# Patient Record
Sex: FEMALE | Race: WHITE | Employment: OTHER | URBAN - METROPOLITAN AREA
[De-identification: names, ages, dates, MRNs, and addresses within clinical notes are randomized per-mention and may not be internally consistent; named-entity substitution may affect disease eponyms.]

---

## 2018-01-01 ENCOUNTER — TELEPHONE (OUTPATIENT)
Dept: PHARMACY | Facility: CLINIC | Age: 73
End: 2018-01-01

## 2018-01-01 ENCOUNTER — OFFICE VISIT (OUTPATIENT)
Dept: INTERNAL MEDICINE CLINIC | Age: 73
End: 2018-01-01
Payer: MEDICARE

## 2018-01-01 ENCOUNTER — HOSPITAL ENCOUNTER (EMERGENCY)
Age: 73
Discharge: HOME | End: 2018-04-19
Attending: EMERGENCY MEDICINE
Payer: MEDICARE

## 2018-01-01 ENCOUNTER — APPOINTMENT (OUTPATIENT)
Dept: MRI IMAGING | Age: 73
DRG: 478 | End: 2018-01-01
Payer: MEDICARE

## 2018-01-01 ENCOUNTER — HOSPITAL ENCOUNTER (INPATIENT)
Age: 73
LOS: 5 days | Discharge: NURSING, REHAB OR LONG-TERM CARE | DRG: 478 | End: 2018-07-20
Attending: EMERGENCY MEDICINE | Admitting: INTERNAL MEDICINE
Payer: MEDICARE

## 2018-01-01 ENCOUNTER — APPOINTMENT (OUTPATIENT)
Dept: GENERAL RADIOLOGY | Age: 73
DRG: 478 | End: 2018-01-01
Payer: MEDICARE

## 2018-01-01 ENCOUNTER — HOSPITAL ENCOUNTER (EMERGENCY)
Age: 73
Discharge: HOME | End: 2018-12-07
Attending: EMERGENCY MEDICINE
Payer: MEDICARE

## 2018-01-01 ENCOUNTER — OFFICE VISIT (OUTPATIENT)
Dept: ONCOLOGY | Age: 73
End: 2018-01-01
Payer: MEDICARE

## 2018-01-01 ENCOUNTER — HOSPITAL ENCOUNTER (EMERGENCY)
Age: 73
Discharge: HOME | End: 2018-09-17
Attending: EMERGENCY MEDICINE
Payer: MEDICARE

## 2018-01-01 ENCOUNTER — TELEPHONE (OUTPATIENT)
Dept: ONCOLOGY | Age: 73
End: 2018-01-01

## 2018-01-01 ENCOUNTER — HOSPITAL ENCOUNTER (OUTPATIENT)
Age: 73
Discharge: HOME | End: 2018-10-19
Payer: MEDICARE

## 2018-01-01 ENCOUNTER — OFFICE VISIT (OUTPATIENT)
Dept: ORTHOPEDIC SURGERY | Age: 73
End: 2018-01-01
Payer: MEDICARE

## 2018-01-01 ENCOUNTER — ANESTHESIA (OUTPATIENT)
Dept: OPERATING ROOM | Age: 73
DRG: 478 | End: 2018-01-01
Payer: MEDICARE

## 2018-01-01 ENCOUNTER — HOSPITAL ENCOUNTER (OUTPATIENT)
Age: 73
Discharge: HOME | End: 2018-08-09
Payer: MEDICARE

## 2018-01-01 ENCOUNTER — APPOINTMENT (OUTPATIENT)
Dept: CT IMAGING | Age: 73
DRG: 478 | End: 2018-01-01
Payer: MEDICARE

## 2018-01-01 ENCOUNTER — HOSPITAL ENCOUNTER (OUTPATIENT)
Dept: CT IMAGING | Age: 73
Discharge: HOME | End: 2018-10-14
Payer: MEDICARE

## 2018-01-01 ENCOUNTER — ANESTHESIA EVENT (OUTPATIENT)
Dept: OPERATING ROOM | Age: 73
DRG: 478 | End: 2018-01-01
Payer: MEDICARE

## 2018-01-01 ENCOUNTER — TELEPHONE (OUTPATIENT)
Dept: ORTHOPEDIC SURGERY | Age: 73
End: 2018-01-01

## 2018-01-01 ENCOUNTER — HOSPITAL ENCOUNTER (OUTPATIENT)
Dept: RADIATION ONCOLOGY | Age: 73
Discharge: HOME | End: 2018-07-26
Payer: MEDICARE

## 2018-01-01 ENCOUNTER — HOSPITAL ENCOUNTER (OUTPATIENT)
Dept: RADIATION ONCOLOGY | Age: 73
Discharge: HOME | End: 2018-08-01
Payer: MEDICARE

## 2018-01-01 ENCOUNTER — TELEPHONE (OUTPATIENT)
Dept: INTERNAL MEDICINE CLINIC | Age: 73
End: 2018-01-01

## 2018-01-01 ENCOUNTER — HOSPITAL ENCOUNTER (OUTPATIENT)
Dept: MRI IMAGING | Age: 73
Discharge: HOME | End: 2018-08-10
Payer: MEDICARE

## 2018-01-01 ENCOUNTER — APPOINTMENT (OUTPATIENT)
Dept: GENERAL RADIOLOGY | Age: 73
End: 2018-01-01
Payer: MEDICARE

## 2018-01-01 ENCOUNTER — OFFICE VISIT (OUTPATIENT)
Dept: ORTHOPEDIC SURGERY | Age: 73
End: 2018-01-01

## 2018-01-01 VITALS
SYSTOLIC BLOOD PRESSURE: 146 MMHG | OXYGEN SATURATION: 97 % | HEART RATE: 93 BPM | BODY MASS INDEX: 25.12 KG/M2 | DIASTOLIC BLOOD PRESSURE: 70 MMHG | HEIGHT: 66 IN | WEIGHT: 156.31 LBS | TEMPERATURE: 98.1 F | RESPIRATION RATE: 16 BRPM

## 2018-01-01 VITALS
DIASTOLIC BLOOD PRESSURE: 76 MMHG | WEIGHT: 124.25 LBS | BODY MASS INDEX: 20.05 KG/M2 | TEMPERATURE: 99.4 F | SYSTOLIC BLOOD PRESSURE: 144 MMHG | HEART RATE: 93 BPM

## 2018-01-01 VITALS
WEIGHT: 119.31 LBS | HEART RATE: 85 BPM | TEMPERATURE: 97.8 F | DIASTOLIC BLOOD PRESSURE: 83 MMHG | BODY MASS INDEX: 19.26 KG/M2 | SYSTOLIC BLOOD PRESSURE: 138 MMHG

## 2018-01-01 VITALS
SYSTOLIC BLOOD PRESSURE: 142 MMHG | WEIGHT: 119.5 LBS | HEART RATE: 76 BPM | TEMPERATURE: 98.2 F | BODY MASS INDEX: 19.29 KG/M2 | DIASTOLIC BLOOD PRESSURE: 82 MMHG

## 2018-01-01 VITALS
RESPIRATION RATE: 13 BRPM | TEMPERATURE: 97 F | OXYGEN SATURATION: 99 % | SYSTOLIC BLOOD PRESSURE: 111 MMHG | DIASTOLIC BLOOD PRESSURE: 58 MMHG

## 2018-01-01 VITALS
RESPIRATION RATE: 16 BRPM | WEIGHT: 125 LBS | HEIGHT: 66 IN | OXYGEN SATURATION: 100 % | TEMPERATURE: 97.7 F | BODY MASS INDEX: 20.09 KG/M2 | SYSTOLIC BLOOD PRESSURE: 118 MMHG | DIASTOLIC BLOOD PRESSURE: 61 MMHG | HEART RATE: 72 BPM

## 2018-01-01 VITALS
SYSTOLIC BLOOD PRESSURE: 170 MMHG | DIASTOLIC BLOOD PRESSURE: 91 MMHG | HEIGHT: 63 IN | BODY MASS INDEX: 24.27 KG/M2 | HEART RATE: 94 BPM | WEIGHT: 137 LBS

## 2018-01-01 VITALS
OXYGEN SATURATION: 100 % | DIASTOLIC BLOOD PRESSURE: 73 MMHG | SYSTOLIC BLOOD PRESSURE: 148 MMHG | RESPIRATION RATE: 18 BRPM | BODY MASS INDEX: 19.13 KG/M2 | WEIGHT: 119 LBS | HEART RATE: 76 BPM | TEMPERATURE: 97.7 F | HEIGHT: 66 IN

## 2018-01-01 VITALS — DIASTOLIC BLOOD PRESSURE: 51 MMHG | OXYGEN SATURATION: 99 % | TEMPERATURE: 89.8 F | SYSTOLIC BLOOD PRESSURE: 102 MMHG

## 2018-01-01 VITALS
TEMPERATURE: 98 F | BODY MASS INDEX: 24.11 KG/M2 | HEIGHT: 66 IN | HEART RATE: 73 BPM | DIASTOLIC BLOOD PRESSURE: 69 MMHG | OXYGEN SATURATION: 100 % | SYSTOLIC BLOOD PRESSURE: 177 MMHG | WEIGHT: 150 LBS | RESPIRATION RATE: 16 BRPM

## 2018-01-01 VITALS
SYSTOLIC BLOOD PRESSURE: 136 MMHG | WEIGHT: 125 LBS | TEMPERATURE: 99 F | DIASTOLIC BLOOD PRESSURE: 70 MMHG | BODY MASS INDEX: 20.18 KG/M2 | HEART RATE: 67 BPM

## 2018-01-01 DIAGNOSIS — C79.51 METASTASIS TO BONE (HCC): ICD-10-CM

## 2018-01-01 DIAGNOSIS — C50.919 METASTATIC BREAST CANCER (HCC): Primary | ICD-10-CM

## 2018-01-01 DIAGNOSIS — C50.919 METASTATIC BREAST CANCER (HCC): ICD-10-CM

## 2018-01-01 DIAGNOSIS — C50.911 MALIGNANT NEOPLASM OF RIGHT FEMALE BREAST, UNSPECIFIED ESTROGEN RECEPTOR STATUS, UNSPECIFIED SITE OF BREAST (HCC): ICD-10-CM

## 2018-01-01 DIAGNOSIS — I10 ESSENTIAL HYPERTENSION: Primary | ICD-10-CM

## 2018-01-01 DIAGNOSIS — C79.51 METASTASIS TO BONE (HCC): Primary | ICD-10-CM

## 2018-01-01 DIAGNOSIS — I87.1 SVC SYNDROME: ICD-10-CM

## 2018-01-01 DIAGNOSIS — R55 NEAR SYNCOPE: Primary | ICD-10-CM

## 2018-01-01 DIAGNOSIS — J91.0 MALIGNANT PLEURAL EFFUSION: ICD-10-CM

## 2018-01-01 DIAGNOSIS — M25.562 ACUTE PAIN OF LEFT KNEE: Primary | ICD-10-CM

## 2018-01-01 DIAGNOSIS — R55 SYNCOPE AND COLLAPSE: Primary | ICD-10-CM

## 2018-01-01 DIAGNOSIS — E87.6 HYPOKALEMIA: ICD-10-CM

## 2018-01-01 DIAGNOSIS — R52 PAIN: Primary | ICD-10-CM

## 2018-01-01 DIAGNOSIS — I10 ESSENTIAL HYPERTENSION: ICD-10-CM

## 2018-01-01 LAB
-: ABNORMAL
ABSOLUTE BANDS #: 0.73 K/UL (ref 0–1)
ABSOLUTE EOS #: 0 K/UL (ref 0–0.4)
ABSOLUTE EOS #: 0.03 K/UL (ref 0–0.4)
ABSOLUTE EOS #: 0.05 K/UL (ref 0–0.4)
ABSOLUTE EOS #: 0.05 K/UL (ref 0–0.4)
ABSOLUTE IMMATURE GRANULOCYTE: ABNORMAL K/UL (ref 0–0.3)
ABSOLUTE LYMPH #: 1.47 K/UL (ref 1–4.8)
ABSOLUTE LYMPH #: 1.76 K/UL (ref 1–4.8)
ABSOLUTE LYMPH #: 1.97 K/UL (ref 1–4.8)
ABSOLUTE LYMPH #: 13.67 K/UL (ref 1–4.8)
ABSOLUTE LYMPH #: 21.53 K/UL (ref 1–4.8)
ABSOLUTE LYMPH #: 3.02 K/UL (ref 1–4.8)
ABSOLUTE LYMPH #: 30.94 K/UL (ref 1–4.8)
ABSOLUTE LYMPH #: 42.93 K/UL (ref 1–4.8)
ABSOLUTE LYMPH #: 5.98 K/UL (ref 1–4.8)
ABSOLUTE MONO #: 0.09 K/UL (ref 0.1–0.8)
ABSOLUTE MONO #: 0.2 K/UL (ref 0.1–1.3)
ABSOLUTE MONO #: 0.36 K/UL (ref 0.1–1.3)
ABSOLUTE MONO #: 0.37 K/UL (ref 0.1–1.3)
ABSOLUTE MONO #: 0.56 K/UL (ref 0.1–1.3)
ABSOLUTE MONO #: 0.65 K/UL (ref 0.1–1.3)
ABSOLUTE MONO #: 0.73 K/UL (ref 0.1–1.3)
ABSOLUTE MONO #: 1.25 K/UL (ref 0.1–1.3)
ABSOLUTE MONO #: 2.12 K/UL (ref 0.1–1.3)
ALBUMIN SERPL-MCNC: 3.3 G/DL (ref 3.5–5.2)
ALBUMIN SERPL-MCNC: 3.8 G/DL (ref 3.5–5.2)
ALBUMIN SERPL-MCNC: 4.3 G/DL (ref 3.5–5.2)
ALBUMIN SERPL-MCNC: NORMAL G/DL
ALBUMIN/GLOBULIN RATIO: 2 (ref 1–2.5)
ALBUMIN/GLOBULIN RATIO: ABNORMAL (ref 1–2.5)
ALBUMIN/GLOBULIN RATIO: ABNORMAL (ref 1–2.5)
ALP BLD-CCNC: 145 U/L (ref 35–104)
ALP BLD-CCNC: 94 U/L (ref 35–104)
ALP BLD-CCNC: 94 U/L (ref 35–104)
ALP BLD-CCNC: NORMAL U/L
ALT SERPL-CCNC: 10 U/L (ref 5–33)
ALT SERPL-CCNC: 8 U/L (ref 5–33)
ALT SERPL-CCNC: 8 U/L (ref 5–33)
ALT SERPL-CCNC: NORMAL U/L
AMORPHOUS: ABNORMAL
ANION GAP SERPL CALCULATED.3IONS-SCNC: 10 MMOL/L (ref 9–17)
ANION GAP SERPL CALCULATED.3IONS-SCNC: 12 MMOL/L (ref 9–17)
ANION GAP SERPL CALCULATED.3IONS-SCNC: 13 MMOL/L (ref 9–17)
ANION GAP SERPL CALCULATED.3IONS-SCNC: 14 MMOL/L (ref 9–17)
ANION GAP SERPL CALCULATED.3IONS-SCNC: 14 MMOL/L (ref 9–17)
ANION GAP SERPL CALCULATED.3IONS-SCNC: 15 MMOL/L (ref 9–17)
ANION GAP SERPL CALCULATED.3IONS-SCNC: 15 MMOL/L (ref 9–17)
ANION GAP SERPL CALCULATED.3IONS-SCNC: NORMAL MMOL/L
AST SERPL-CCNC: 35 U/L
AST SERPL-CCNC: 71 U/L
AST SERPL-CCNC: 86 U/L
AST SERPL-CCNC: NORMAL U/L
ATYPICAL LYMPHOCYTE ABSOLUTE COUNT: 0.12 K/UL
ATYPICAL LYMPHOCYTE ABSOLUTE COUNT: 0.23 K/UL
ATYPICAL LYMPHOCYTE ABSOLUTE COUNT: 0.56 K/UL
ATYPICAL LYMPHOCYTES: 1 %
ATYPICAL LYMPHOCYTES: 2 %
ATYPICAL LYMPHOCYTES: 5 %
BACTERIA: ABNORMAL
BANDS: 2 % (ref 0–10)
BASOPHILS # BLD: 0 % (ref 0–2)
BASOPHILS # BLD: 1 % (ref 0–2)
BASOPHILS # BLD: 1 % (ref 0–2)
BASOPHILS ABSOLUTE: 0 K/UL (ref 0–0.2)
BASOPHILS ABSOLUTE: 0.03 K/UL (ref 0–0.2)
BASOPHILS ABSOLUTE: 0.05 K/UL (ref 0–0.2)
BASOPHILS ABSOLUTE: NORMAL /ΜL
BASOPHILS RELATIVE PERCENT: NORMAL %
BILIRUB SERPL-MCNC: 0.48 MG/DL (ref 0.3–1.2)
BILIRUB SERPL-MCNC: 0.75 MG/DL (ref 0.3–1.2)
BILIRUB SERPL-MCNC: 0.96 MG/DL (ref 0.3–1.2)
BILIRUB SERPL-MCNC: NORMAL MG/DL (ref 0.1–1.4)
BILIRUBIN URINE: NEGATIVE
BILIRUBIN URINE: NEGATIVE
BUN BLDV-MCNC: 13 MG/DL (ref 8–23)
BUN BLDV-MCNC: 14 MG/DL (ref 8–23)
BUN BLDV-MCNC: 16 MG/DL (ref 8–23)
BUN BLDV-MCNC: 16 MG/DL (ref 8–23)
BUN BLDV-MCNC: 7 MG/DL (ref 8–23)
BUN BLDV-MCNC: 7 MG/DL (ref 8–23)
BUN BLDV-MCNC: NORMAL MG/DL
BUN/CREAT BLD: ABNORMAL (ref 9–20)
C-REACTIVE PROTEIN: 131 MG/L (ref 0–5)
CA 27-29: 1794 U/ML (ref 0–38)
CALCIUM SERPL-MCNC: 8.3 MG/DL (ref 8.6–10.4)
CALCIUM SERPL-MCNC: 8.4 MG/DL (ref 8.6–10.4)
CALCIUM SERPL-MCNC: 8.4 MG/DL (ref 8.6–10.4)
CALCIUM SERPL-MCNC: 8.6 MG/DL (ref 8.6–10.4)
CALCIUM SERPL-MCNC: 8.8 MG/DL (ref 8.6–10.4)
CALCIUM SERPL-MCNC: 9.1 MG/DL (ref 8.6–10.4)
CALCIUM SERPL-MCNC: 9.2 MG/DL (ref 8.6–10.4)
CALCIUM SERPL-MCNC: 9.6 MG/DL (ref 8.6–10.4)
CALCIUM SERPL-MCNC: 9.8 MG/DL (ref 8.6–10.4)
CALCIUM SERPL-MCNC: NORMAL MG/DL
CARCINOEMBRYONIC ANTIGEN: 1.6 NG/ML
CASTS UA: ABNORMAL /LPF
CHLORIDE BLD-SCNC: 100 MMOL/L (ref 98–107)
CHLORIDE BLD-SCNC: 100 MMOL/L (ref 98–107)
CHLORIDE BLD-SCNC: 103 MMOL/L (ref 98–107)
CHLORIDE BLD-SCNC: 104 MMOL/L (ref 98–107)
CHLORIDE BLD-SCNC: 91 MMOL/L (ref 98–107)
CHLORIDE BLD-SCNC: 93 MMOL/L (ref 98–107)
CHLORIDE BLD-SCNC: 94 MMOL/L (ref 98–107)
CHLORIDE BLD-SCNC: 95 MMOL/L (ref 98–107)
CHLORIDE BLD-SCNC: 95 MMOL/L (ref 98–107)
CHLORIDE BLD-SCNC: NORMAL MMOL/L
CHP ED QC CHECK: YES
CO2: 23 MMOL/L (ref 20–31)
CO2: 24 MMOL/L (ref 20–31)
CO2: 25 MMOL/L (ref 20–31)
CO2: 25 MMOL/L (ref 20–31)
CO2: 26 MMOL/L (ref 20–31)
CO2: NORMAL MMOL/L
COLOR: YELLOW
COLOR: YELLOW
COMMENT UA: ABNORMAL
COMMENT UA: ABNORMAL
CREAT SERPL-MCNC: 0.44 MG/DL (ref 0.5–0.9)
CREAT SERPL-MCNC: 0.46 MG/DL (ref 0.5–0.9)
CREAT SERPL-MCNC: 0.46 MG/DL (ref 0.5–0.9)
CREAT SERPL-MCNC: 0.47 MG/DL (ref 0.5–0.9)
CREAT SERPL-MCNC: 0.5 MG/DL (ref 0.5–0.9)
CREAT SERPL-MCNC: 0.52 MG/DL (ref 0.5–0.9)
CREAT SERPL-MCNC: 0.56 MG/DL (ref 0.5–0.9)
CREAT SERPL-MCNC: 0.6 MG/DL (ref 0.5–0.9)
CREAT SERPL-MCNC: 0.6 MG/DL (ref 0.5–0.9)
CREAT SERPL-MCNC: 0.67 MG/DL (ref 0.5–0.9)
CREAT SERPL-MCNC: NORMAL MG/DL
CRYSTALS, UA: ABNORMAL /HPF
DIFFERENTIAL TYPE: ABNORMAL
EKG ATRIAL RATE: 74 BPM
EKG ATRIAL RATE: 76 BPM
EKG P AXIS: 71 DEGREES
EKG P AXIS: 79 DEGREES
EKG P-R INTERVAL: 146 MS
EKG P-R INTERVAL: 148 MS
EKG Q-T INTERVAL: 408 MS
EKG Q-T INTERVAL: 420 MS
EKG QRS DURATION: 94 MS
EKG QRS DURATION: 96 MS
EKG QTC CALCULATION (BAZETT): 459 MS
EKG QTC CALCULATION (BAZETT): 466 MS
EKG R AXIS: 62 DEGREES
EKG R AXIS: 72 DEGREES
EKG T AXIS: 68 DEGREES
EKG T AXIS: 72 DEGREES
EKG VENTRICULAR RATE: 74 BPM
EKG VENTRICULAR RATE: 76 BPM
EOSINOPHILS ABSOLUTE: NORMAL /ΜL
EOSINOPHILS RELATIVE PERCENT: 0 % (ref 0–4)
EOSINOPHILS RELATIVE PERCENT: 1 % (ref 0–4)
EOSINOPHILS RELATIVE PERCENT: 1 % (ref 0–4)
EOSINOPHILS RELATIVE PERCENT: 1 % (ref 1–4)
EOSINOPHILS RELATIVE PERCENT: NORMAL %
EPITHELIAL CELLS UA: ABNORMAL /HPF
FLOW CYTOMETRY BL: NORMAL
GFR AFRICAN AMERICAN: >60 ML/MIN
GFR CALCULATED: NORMAL
GFR NON-AFRICAN AMERICAN: >60 ML/MIN
GFR SERPL CREATININE-BSD FRML MDRD: ABNORMAL ML/MIN/{1.73_M2}
GFR SERPL CREATININE-BSD FRML MDRD: NORMAL ML/MIN/{1.73_M2}
GFR SERPL CREATININE-BSD FRML MDRD: NORMAL ML/MIN/{1.73_M2}
GLUCOSE BLD-MCNC: 102 MG/DL (ref 65–105)
GLUCOSE BLD-MCNC: 102 MG/DL (ref 70–99)
GLUCOSE BLD-MCNC: 102 MG/DL (ref 70–99)
GLUCOSE BLD-MCNC: 103 MG/DL (ref 70–99)
GLUCOSE BLD-MCNC: 108 MG/DL (ref 70–99)
GLUCOSE BLD-MCNC: 112 MG/DL (ref 70–99)
GLUCOSE BLD-MCNC: 116 MG/DL (ref 70–99)
GLUCOSE BLD-MCNC: 117 MG/DL (ref 65–105)
GLUCOSE BLD-MCNC: 119 MG/DL (ref 65–105)
GLUCOSE BLD-MCNC: 119 MG/DL (ref 65–105)
GLUCOSE BLD-MCNC: 120 MG/DL (ref 65–105)
GLUCOSE BLD-MCNC: 120 MG/DL (ref 65–105)
GLUCOSE BLD-MCNC: 121 MG/DL (ref 65–105)
GLUCOSE BLD-MCNC: 122 MG/DL (ref 65–105)
GLUCOSE BLD-MCNC: 122 MG/DL (ref 70–99)
GLUCOSE BLD-MCNC: 123 MG/DL (ref 65–105)
GLUCOSE BLD-MCNC: 136 MG/DL (ref 70–99)
GLUCOSE BLD-MCNC: 140 MG/DL (ref 65–105)
GLUCOSE BLD-MCNC: 148 MG/DL (ref 65–105)
GLUCOSE BLD-MCNC: 151 MG/DL (ref 65–105)
GLUCOSE BLD-MCNC: 186 MG/DL (ref 65–105)
GLUCOSE BLD-MCNC: 72 MG/DL
GLUCOSE BLD-MCNC: 73 MG/DL (ref 65–105)
GLUCOSE BLD-MCNC: 94 MG/DL (ref 65–105)
GLUCOSE BLD-MCNC: 96 MG/DL (ref 70–99)
GLUCOSE BLD-MCNC: NORMAL MG/DL
GLUCOSE URINE: NEGATIVE
GLUCOSE URINE: NEGATIVE
HCT VFR BLD CALC: 24.9 % (ref 36–46)
HCT VFR BLD CALC: 25.6 % (ref 36–46)
HCT VFR BLD CALC: 26.8 % (ref 36–46)
HCT VFR BLD CALC: 29.4 % (ref 36–46)
HCT VFR BLD CALC: 30.2 % (ref 36–46)
HCT VFR BLD CALC: 30.8 % (ref 36–46)
HCT VFR BLD CALC: 30.8 % (ref 36–46)
HCT VFR BLD CALC: 32.4 % (ref 36–46)
HCT VFR BLD CALC: 32.9 % (ref 36–46)
HCT VFR BLD CALC: 33.1 % (ref 36–46)
HCT VFR BLD CALC: NORMAL % (ref 36–46)
HEMOGLOBIN: 10.1 G/DL (ref 12–16)
HEMOGLOBIN: 10.1 G/DL (ref 12–16)
HEMOGLOBIN: 10.6 G/DL (ref 12–16)
HEMOGLOBIN: 10.8 G/DL (ref 12–16)
HEMOGLOBIN: 11.3 G/DL (ref 12–16)
HEMOGLOBIN: 11.6 G/DL (ref 12–16)
HEMOGLOBIN: 8.1 G/DL (ref 12–16)
HEMOGLOBIN: 8.4 G/DL (ref 12–16)
HEMOGLOBIN: 9.5 G/DL (ref 12–16)
HEMOGLOBIN: 9.7 G/DL (ref 12–16)
HEMOGLOBIN: NORMAL G/DL (ref 12–16)
IMMATURE GRANULOCYTES: ABNORMAL %
INR BLD: 1.2
KETONES, URINE: ABNORMAL
KETONES, URINE: NEGATIVE
LACTATE DEHYDROGENASE: 189 U/L (ref 135–214)
LEUKOCYTE ESTERASE, URINE: ABNORMAL
LEUKOCYTE ESTERASE, URINE: NEGATIVE
LYMPHOCYTES # BLD: 28 % (ref 24–44)
LYMPHOCYTES # BLD: 36 % (ref 24–44)
LYMPHOCYTES # BLD: 44 % (ref 24–44)
LYMPHOCYTES # BLD: 49 % (ref 24–44)
LYMPHOCYTES # BLD: 59 % (ref 24–44)
LYMPHOCYTES # BLD: 74 % (ref 24–44)
LYMPHOCYTES # BLD: 81 % (ref 24–44)
LYMPHOCYTES ABSOLUTE: NORMAL /ΜL
LYMPHOCYTES RELATIVE PERCENT: NORMAL %
MAGNESIUM: 1.7 MG/DL (ref 1.6–2.6)
MAGNESIUM: 1.8 MG/DL (ref 1.6–2.6)
MCH RBC QN AUTO: 29.6 PG (ref 26–34)
MCH RBC QN AUTO: 30 PG (ref 26–34)
MCH RBC QN AUTO: 30.2 PG (ref 26–34)
MCH RBC QN AUTO: 30.6 PG (ref 26–34)
MCH RBC QN AUTO: 32.1 PG (ref 26–34)
MCH RBC QN AUTO: 36.4 PG (ref 26–34)
MCH RBC QN AUTO: 37.7 PG (ref 26–34)
MCH RBC QN AUTO: 41.9 PG (ref 26–34)
MCH RBC QN AUTO: NORMAL PG
MCHC RBC AUTO-ENTMCNC: 32.6 G/DL (ref 31–37)
MCHC RBC AUTO-ENTMCNC: 32.6 G/DL (ref 31–37)
MCHC RBC AUTO-ENTMCNC: 32.7 G/DL (ref 31–37)
MCHC RBC AUTO-ENTMCNC: 32.9 G/DL (ref 31–37)
MCHC RBC AUTO-ENTMCNC: 33 G/DL (ref 31–37)
MCHC RBC AUTO-ENTMCNC: 33.3 G/DL (ref 31–37)
MCHC RBC AUTO-ENTMCNC: 34.3 G/DL (ref 31–37)
MCHC RBC AUTO-ENTMCNC: 34.5 G/DL (ref 31–37)
MCHC RBC AUTO-ENTMCNC: 35.6 G/DL (ref 31–37)
MCHC RBC AUTO-ENTMCNC: 35.8 G/DL (ref 31–37)
MCHC RBC AUTO-ENTMCNC: NORMAL G/DL
MCV RBC AUTO: 102 FL (ref 80–100)
MCV RBC AUTO: 109.2 FL (ref 80–100)
MCV RBC AUTO: 117 FL (ref 80–100)
MCV RBC AUTO: 89.3 FL (ref 80–100)
MCV RBC AUTO: 89.9 FL (ref 80–100)
MCV RBC AUTO: 90.6 FL (ref 80–100)
MCV RBC AUTO: 91 FL (ref 80–100)
MCV RBC AUTO: 91.2 FL (ref 80–100)
MCV RBC AUTO: 92.6 FL (ref 80–100)
MCV RBC AUTO: 98 FL (ref 80–100)
MCV RBC AUTO: NORMAL FL
MONOCYTES # BLD: 2 % (ref 1–7)
MONOCYTES # BLD: 2 % (ref 1–7)
MONOCYTES # BLD: 3 % (ref 1–7)
MONOCYTES # BLD: 4 % (ref 1–7)
MONOCYTES # BLD: 4 % (ref 1–7)
MONOCYTES # BLD: 6 % (ref 1–7)
MONOCYTES # BLD: 8 % (ref 1–7)
MONOCYTES ABSOLUTE: NORMAL /ΜL
MONOCYTES RELATIVE PERCENT: NORMAL %
MORPHOLOGY: ABNORMAL
MORPHOLOGY: NORMAL
MUCUS: ABNORMAL
NEUTROPHILS ABSOLUTE: NORMAL /ΜL
NEUTROPHILS RELATIVE PERCENT: NORMAL %
NITRITE, URINE: NEGATIVE
NITRITE, URINE: NEGATIVE
NRBC AUTOMATED: ABNORMAL PER 100 WBC
OTHER OBSERVATIONS UA: ABNORMAL
PATHOLOGIST REVIEW: NORMAL
PDW BLD-RTO: 13.6 % (ref 11.5–14.9)
PDW BLD-RTO: 14.9 % (ref 11.5–14.9)
PDW BLD-RTO: 15 % (ref 11.5–14.9)
PDW BLD-RTO: 15.1 % (ref 11.5–14.9)
PDW BLD-RTO: 15.2 % (ref 11.5–14.9)
PDW BLD-RTO: 15.4 % (ref 11.5–14.9)
PDW BLD-RTO: 15.4 % (ref 11.5–14.9)
PDW BLD-RTO: 20.5 % (ref 12.5–15.4)
PDW BLD-RTO: 21.9 % (ref 12.5–15.4)
PDW BLD-RTO: 22.5 % (ref 11.5–14.9)
PDW BLD-RTO: NORMAL %
PH UA: 6 (ref 5–8)
PH UA: 6.5 (ref 5–8)
PHOSPHORUS: 3.6 MG/DL (ref 2.6–4.5)
PLATELET # BLD: 172 K/UL (ref 150–450)
PLATELET # BLD: 239 K/UL (ref 150–450)
PLATELET # BLD: 250 K/UL (ref 150–450)
PLATELET # BLD: 256 K/UL (ref 150–450)
PLATELET # BLD: 267 K/UL (ref 140–450)
PLATELET # BLD: 276 K/UL (ref 140–450)
PLATELET # BLD: 300 K/UL (ref 150–450)
PLATELET # BLD: 301 K/UL (ref 150–450)
PLATELET # BLD: 329 K/UL (ref 150–450)
PLATELET # BLD: 348 K/UL (ref 150–450)
PLATELET # BLD: NORMAL K/ΜL
PLATELET ESTIMATE: ABNORMAL
PMV BLD AUTO: 7 FL (ref 6–12)
PMV BLD AUTO: 7.1 FL (ref 6–12)
PMV BLD AUTO: 7.2 FL (ref 6–12)
PMV BLD AUTO: 7.3 FL (ref 6–12)
PMV BLD AUTO: 7.5 FL (ref 6–12)
PMV BLD AUTO: 7.5 FL (ref 6–12)
PMV BLD AUTO: 8.2 FL (ref 6–12)
PMV BLD AUTO: NORMAL FL
POTASSIUM SERPL-SCNC: 3.1 MMOL/L (ref 3.7–5.3)
POTASSIUM SERPL-SCNC: 3.4 MMOL/L (ref 3.7–5.3)
POTASSIUM SERPL-SCNC: 3.8 MMOL/L (ref 3.7–5.3)
POTASSIUM SERPL-SCNC: 3.8 MMOL/L (ref 3.7–5.3)
POTASSIUM SERPL-SCNC: 4.4 MMOL/L (ref 3.7–5.3)
POTASSIUM SERPL-SCNC: 4.5 MMOL/L (ref 3.7–5.3)
POTASSIUM SERPL-SCNC: 4.5 MMOL/L (ref 3.7–5.3)
POTASSIUM SERPL-SCNC: 4.6 MMOL/L (ref 3.7–5.3)
POTASSIUM SERPL-SCNC: 4.7 MMOL/L (ref 3.7–5.3)
POTASSIUM SERPL-SCNC: NORMAL MMOL/L
PROTEIN UA: NEGATIVE
PROTEIN UA: NEGATIVE
PROTHROMBIN TIME: 12.5 SEC (ref 9.7–12)
RBC # BLD: 2.63 M/UL (ref 4–5.2)
RBC # BLD: 2.69 M/UL (ref 4–5.2)
RBC # BLD: 2.77 M/UL (ref 4–5.2)
RBC # BLD: 2.8 M/UL (ref 4–5.2)
RBC # BLD: 2.82 M/UL (ref 4–5.2)
RBC # BLD: 3.14 M/UL (ref 4–5.2)
RBC # BLD: 3.23 M/UL (ref 4–5.2)
RBC # BLD: 3.35 M/UL (ref 4–5.2)
RBC # BLD: 3.65 M/UL (ref 4–5.2)
RBC # BLD: 3.68 M/UL (ref 4–5.2)
RBC # BLD: ABNORMAL 10*6/UL
RBC # BLD: NORMAL 10^6/ΜL
RBC UA: ABNORMAL /HPF
RENAL EPITHELIAL, UA: ABNORMAL /HPF
SEDIMENTATION RATE, ERYTHROCYTE: 60 MM (ref 0–20)
SEG NEUTROPHILS: 15 % (ref 36–66)
SEG NEUTROPHILS: 23 % (ref 36–66)
SEG NEUTROPHILS: 37 % (ref 36–66)
SEG NEUTROPHILS: 42 % (ref 36–66)
SEG NEUTROPHILS: 46 % (ref 36–66)
SEG NEUTROPHILS: 47 % (ref 36–66)
SEG NEUTROPHILS: 47 % (ref 36–66)
SEG NEUTROPHILS: 58 % (ref 36–66)
SEG NEUTROPHILS: 66 % (ref 36–66)
SEGMENTED NEUTROPHILS ABSOLUTE COUNT: 1.38 K/UL (ref 1.8–7.7)
SEGMENTED NEUTROPHILS ABSOLUTE COUNT: 1.89 K/UL (ref 1.3–9.1)
SEGMENTED NEUTROPHILS ABSOLUTE COUNT: 13.11 K/UL (ref 1.3–9.1)
SEGMENTED NEUTROPHILS ABSOLUTE COUNT: 13.51 K/UL (ref 1.3–9.1)
SEGMENTED NEUTROPHILS ABSOLUTE COUNT: 2.84 K/UL (ref 1.3–9.1)
SEGMENTED NEUTROPHILS ABSOLUTE COUNT: 5.73 K/UL (ref 1.3–9.1)
SEGMENTED NEUTROPHILS ABSOLUTE COUNT: 7.13 K/UL (ref 1.3–9.1)
SEGMENTED NEUTROPHILS ABSOLUTE COUNT: 7.95 K/UL (ref 1.3–9.1)
SEGMENTED NEUTROPHILS ABSOLUTE COUNT: 9.61 K/UL (ref 1.3–9.1)
SODIUM BLD-SCNC: 128 MMOL/L (ref 135–144)
SODIUM BLD-SCNC: 129 MMOL/L (ref 135–144)
SODIUM BLD-SCNC: 131 MMOL/L (ref 135–144)
SODIUM BLD-SCNC: 133 MMOL/L (ref 135–144)
SODIUM BLD-SCNC: 135 MMOL/L (ref 135–144)
SODIUM BLD-SCNC: 136 MMOL/L (ref 135–144)
SODIUM BLD-SCNC: 138 MMOL/L (ref 135–144)
SODIUM BLD-SCNC: 140 MMOL/L (ref 135–144)
SODIUM BLD-SCNC: 141 MMOL/L (ref 135–144)
SODIUM BLD-SCNC: NORMAL MMOL/L
SPECIFIC GRAVITY UA: 1.01 (ref 1–1.03)
SPECIFIC GRAVITY UA: 1.01 (ref 1–1.03)
SURGICAL PATHOLOGY REPORT: NORMAL
TOTAL PROTEIN: 5.7 G/DL (ref 6.4–8.3)
TOTAL PROTEIN: 6 G/DL (ref 6.4–8.3)
TOTAL PROTEIN: 6.4 G/DL (ref 6.4–8.3)
TOTAL PROTEIN: NORMAL
TRICHOMONAS: ABNORMAL
TURBIDITY: CLEAR
TURBIDITY: CLEAR
URINE HGB: NEGATIVE
URINE HGB: NEGATIVE
UROBILINOGEN, URINE: NORMAL
UROBILINOGEN, URINE: NORMAL
WBC # BLD: 10.8 K/UL (ref 3.5–11)
WBC # BLD: 12.2 K/UL (ref 3.5–11)
WBC # BLD: 12.7 K/UL (ref 3.5–11)
WBC # BLD: 27.9 K/UL (ref 3.5–11)
WBC # BLD: 3 K/UL (ref 3.5–11)
WBC # BLD: 36.5 K/UL (ref 3.5–11)
WBC # BLD: 4.5 K/UL (ref 3.5–11)
WBC # BLD: 4.9 K/UL (ref 3.5–11)
WBC # BLD: 41.8 K/UL (ref 3.5–11)
WBC # BLD: 53 K/UL (ref 3.5–11)
WBC # BLD: ABNORMAL 10*3/UL
WBC # BLD: NORMAL 10^3/ML
WBC UA: ABNORMAL /HPF
YEAST: ABNORMAL

## 2018-01-01 PROCEDURE — 99223 1ST HOSP IP/OBS HIGH 75: CPT | Performed by: INTERNAL MEDICINE

## 2018-01-01 PROCEDURE — 85610 PROTHROMBIN TIME: CPT

## 2018-01-01 PROCEDURE — 97535 SELF CARE MNGMENT TRAINING: CPT

## 2018-01-01 PROCEDURE — 82947 ASSAY GLUCOSE BLOOD QUANT: CPT

## 2018-01-01 PROCEDURE — 22513 PERQ VERTEBRAL AUGMENTATION: CPT | Performed by: ORTHOPAEDIC SURGERY

## 2018-01-01 PROCEDURE — 6360000002 HC RX W HCPCS: Performed by: INTERNAL MEDICINE

## 2018-01-01 PROCEDURE — 77334 RADIATION TREATMENT AID(S): CPT | Performed by: RADIOLOGY

## 2018-01-01 PROCEDURE — 77336 RADIATION PHYSICS CONSULT: CPT | Performed by: RADIOLOGY

## 2018-01-01 PROCEDURE — 86300 IMMUNOASSAY TUMOR CA 15-3: CPT

## 2018-01-01 PROCEDURE — 2500000003 HC RX 250 WO HCPCS: Performed by: ANESTHESIOLOGY

## 2018-01-01 PROCEDURE — 84100 ASSAY OF PHOSPHORUS: CPT

## 2018-01-01 PROCEDURE — A9579 GAD-BASE MR CONTRAST NOS,1ML: HCPCS | Performed by: INTERNAL MEDICINE

## 2018-01-01 PROCEDURE — 36415 COLL VENOUS BLD VENIPUNCTURE: CPT

## 2018-01-01 PROCEDURE — 73060 X-RAY EXAM OF HUMERUS: CPT

## 2018-01-01 PROCEDURE — 27245 TREAT THIGH FRACTURE: CPT | Performed by: ORTHOPAEDIC SURGERY

## 2018-01-01 PROCEDURE — 2580000003 HC RX 258: Performed by: ORTHOPAEDIC SURGERY

## 2018-01-01 PROCEDURE — 0QU03JZ SUPPLEMENT LUMBAR VERTEBRA WITH SYNTHETIC SUBSTITUTE, PERCUTANEOUS APPROACH: ICD-10-PCS | Performed by: ORTHOPAEDIC SURGERY

## 2018-01-01 PROCEDURE — 77280 THER RAD SIMULAJ FIELD SMPL: CPT | Performed by: RADIOLOGY

## 2018-01-01 PROCEDURE — 72158 MRI LUMBAR SPINE W/O & W/DYE: CPT

## 2018-01-01 PROCEDURE — 93005 ELECTROCARDIOGRAM TRACING: CPT

## 2018-01-01 PROCEDURE — 0QH606Z INSERTION OF INTRAMEDULLARY INTERNAL FIXATION DEVICE INTO RIGHT UPPER FEMUR, OPEN APPROACH: ICD-10-PCS | Performed by: ORTHOPAEDIC SURGERY

## 2018-01-01 PROCEDURE — 85025 COMPLETE CBC W/AUTO DIFF WBC: CPT

## 2018-01-01 PROCEDURE — 99211 OFF/OP EST MAY X REQ PHY/QHP: CPT | Performed by: INTERNAL MEDICINE

## 2018-01-01 PROCEDURE — 99284 EMERGENCY DEPT VISIT MOD MDM: CPT

## 2018-01-01 PROCEDURE — 2500000003 HC RX 250 WO HCPCS: Performed by: NURSE ANESTHETIST, CERTIFIED REGISTERED

## 2018-01-01 PROCEDURE — 85027 COMPLETE CBC AUTOMATED: CPT

## 2018-01-01 PROCEDURE — 6370000000 HC RX 637 (ALT 250 FOR IP): Performed by: NURSE PRACTITIONER

## 2018-01-01 PROCEDURE — 99213 OFFICE O/P EST LOW 20 MIN: CPT | Performed by: INTERNAL MEDICINE

## 2018-01-01 PROCEDURE — 6370000000 HC RX 637 (ALT 250 FOR IP): Performed by: SURGERY

## 2018-01-01 PROCEDURE — 6360000004 HC RX CONTRAST MEDICATION: Performed by: INTERNAL MEDICINE

## 2018-01-01 PROCEDURE — 3700000001 HC ADD 15 MINUTES (ANESTHESIA): Performed by: ORTHOPAEDIC SURGERY

## 2018-01-01 PROCEDURE — 3700000001 HC ADD 15 MINUTES (ANESTHESIA): Performed by: SURGERY

## 2018-01-01 PROCEDURE — 2580000003 HC RX 258: Performed by: FAMILY MEDICINE

## 2018-01-01 PROCEDURE — 2500000003 HC RX 250 WO HCPCS: Performed by: SURGERY

## 2018-01-01 PROCEDURE — 77412 RADIATION TX DELIVERY LVL 3: CPT | Performed by: RADIOLOGY

## 2018-01-01 PROCEDURE — 85651 RBC SED RATE NONAUTOMATED: CPT

## 2018-01-01 PROCEDURE — 3700000000 HC ANESTHESIA ATTENDED CARE: Performed by: SURGERY

## 2018-01-01 PROCEDURE — 80048 BASIC METABOLIC PNL TOTAL CA: CPT

## 2018-01-01 PROCEDURE — 2720000010 HC SURG SUPPLY STERILE: Performed by: ORTHOPAEDIC SURGERY

## 2018-01-01 PROCEDURE — 2720000010 HC SURG SUPPLY STERILE: Performed by: SURGERY

## 2018-01-01 PROCEDURE — C1713 ANCHOR/SCREW BN/BN,TIS/BN: HCPCS | Performed by: ORTHOPAEDIC SURGERY

## 2018-01-01 PROCEDURE — 70553 MRI BRAIN STEM W/O & W/DYE: CPT

## 2018-01-01 PROCEDURE — 80053 COMPREHEN METABOLIC PANEL: CPT

## 2018-01-01 PROCEDURE — 83735 ASSAY OF MAGNESIUM: CPT

## 2018-01-01 PROCEDURE — 73552 X-RAY EXAM OF FEMUR 2/>: CPT

## 2018-01-01 PROCEDURE — 99204 OFFICE O/P NEW MOD 45 MIN: CPT | Performed by: INTERNAL MEDICINE

## 2018-01-01 PROCEDURE — 6360000002 HC RX W HCPCS: Performed by: FAMILY MEDICINE

## 2018-01-01 PROCEDURE — 3600000004 HC SURGERY LEVEL 4 BASE: Performed by: ORTHOPAEDIC SURGERY

## 2018-01-01 PROCEDURE — 70491 CT SOFT TISSUE NECK W/DYE: CPT

## 2018-01-01 PROCEDURE — 2700000000 HC OXYGEN THERAPY PER DAY

## 2018-01-01 PROCEDURE — 73718 MRI LOWER EXTREMITY W/O DYE: CPT

## 2018-01-01 PROCEDURE — 97530 THERAPEUTIC ACTIVITIES: CPT

## 2018-01-01 PROCEDURE — 51798 US URINE CAPACITY MEASURE: CPT

## 2018-01-01 PROCEDURE — 99212 OFFICE O/P EST SF 10 MIN: CPT | Performed by: ORTHOPAEDIC SURGERY

## 2018-01-01 PROCEDURE — 99232 SBSQ HOSP IP/OBS MODERATE 35: CPT | Performed by: INTERNAL MEDICINE

## 2018-01-01 PROCEDURE — 88342 IMHCHEM/IMCYTCHM 1ST ANTB: CPT

## 2018-01-01 PROCEDURE — 82378 CARCINOEMBRYONIC ANTIGEN: CPT

## 2018-01-01 PROCEDURE — 6360000002 HC RX W HCPCS: Performed by: ORTHOPAEDIC SURGERY

## 2018-01-01 PROCEDURE — 6360000002 HC RX W HCPCS: Performed by: ANESTHESIOLOGY

## 2018-01-01 PROCEDURE — 7100000000 HC PACU RECOVERY - FIRST 15 MIN: Performed by: ORTHOPAEDIC SURGERY

## 2018-01-01 PROCEDURE — 6360000002 HC RX W HCPCS

## 2018-01-01 PROCEDURE — 6360000002 HC RX W HCPCS: Performed by: SURGERY

## 2018-01-01 PROCEDURE — 6370000000 HC RX 637 (ALT 250 FOR IP): Performed by: FAMILY MEDICINE

## 2018-01-01 PROCEDURE — 3600000012 HC SURGERY LEVEL 2 ADDTL 15MIN: Performed by: SURGERY

## 2018-01-01 PROCEDURE — 99214 OFFICE O/P EST MOD 30 MIN: CPT | Performed by: INTERNAL MEDICINE

## 2018-01-01 PROCEDURE — 6360000002 HC RX W HCPCS: Performed by: NURSE ANESTHETIST, CERTIFIED REGISTERED

## 2018-01-01 PROCEDURE — 22515 PERQ VERTEBRAL AUGMENTATION: CPT | Performed by: ORTHOPAEDIC SURGERY

## 2018-01-01 PROCEDURE — 99024 POSTOP FOLLOW-UP VISIT: CPT | Performed by: ORTHOPAEDIC SURGERY

## 2018-01-01 PROCEDURE — 51701 INSERT BLADDER CATHETER: CPT

## 2018-01-01 PROCEDURE — G8988 SELF CARE GOAL STATUS: HCPCS

## 2018-01-01 PROCEDURE — 81003 URINALYSIS AUTO W/O SCOPE: CPT

## 2018-01-01 PROCEDURE — 0QS03ZZ REPOSITION LUMBAR VERTEBRA, PERCUTANEOUS APPROACH: ICD-10-PCS | Performed by: ORTHOPAEDIC SURGERY

## 2018-01-01 PROCEDURE — 2780000010 HC IMPLANT OTHER: Performed by: ORTHOPAEDIC SURGERY

## 2018-01-01 PROCEDURE — 7100000001 HC PACU RECOVERY - ADDTL 15 MIN: Performed by: SURGERY

## 2018-01-01 PROCEDURE — 0QH706Z INSERTION OF INTRAMEDULLARY INTERNAL FIXATION DEVICE INTO LEFT UPPER FEMUR, OPEN APPROACH: ICD-10-PCS | Performed by: ORTHOPAEDIC SURGERY

## 2018-01-01 PROCEDURE — 2580000003 HC RX 258: Performed by: INTERNAL MEDICINE

## 2018-01-01 PROCEDURE — C1769 GUIDE WIRE: HCPCS | Performed by: ORTHOPAEDIC SURGERY

## 2018-01-01 PROCEDURE — 99285 EMERGENCY DEPT VISIT HI MDM: CPT

## 2018-01-01 PROCEDURE — 88341 IMHCHEM/IMCYTCHM EA ADD ANTB: CPT

## 2018-01-01 PROCEDURE — 6360000004 HC RX CONTRAST MEDICATION: Performed by: RADIOLOGY

## 2018-01-01 PROCEDURE — 2580000003 HC RX 258

## 2018-01-01 PROCEDURE — 71260 CT THORAX DX C+: CPT

## 2018-01-01 PROCEDURE — 1200000000 HC SEMI PRIVATE

## 2018-01-01 PROCEDURE — 73562 X-RAY EXAM OF KNEE 3: CPT

## 2018-01-01 PROCEDURE — 99213 OFFICE O/P EST LOW 20 MIN: CPT | Performed by: RADIOLOGY

## 2018-01-01 PROCEDURE — 7100000001 HC PACU RECOVERY - ADDTL 15 MIN: Performed by: ORTHOPAEDIC SURGERY

## 2018-01-01 PROCEDURE — 88184 FLOWCYTOMETRY/ TC 1 MARKER: CPT

## 2018-01-01 PROCEDURE — 88311 DECALCIFY TISSUE: CPT

## 2018-01-01 PROCEDURE — 3600000014 HC SURGERY LEVEL 4 ADDTL 15MIN: Performed by: ORTHOPAEDIC SURGERY

## 2018-01-01 PROCEDURE — 3209999900 FLUORO FOR SURGICAL PROCEDURES

## 2018-01-01 PROCEDURE — 6360000004 HC RX CONTRAST MEDICATION: Performed by: EMERGENCY MEDICINE

## 2018-01-01 PROCEDURE — 72100 X-RAY EXAM L-S SPINE 2/3 VWS: CPT

## 2018-01-01 PROCEDURE — 2580000003 HC RX 258: Performed by: SURGERY

## 2018-01-01 PROCEDURE — 3700000000 HC ANESTHESIA ATTENDED CARE: Performed by: ORTHOPAEDIC SURGERY

## 2018-01-01 PROCEDURE — 88360 TUMOR IMMUNOHISTOCHEM/MANUAL: CPT

## 2018-01-01 PROCEDURE — 6370000000 HC RX 637 (ALT 250 FOR IP): Performed by: INTERNAL MEDICINE

## 2018-01-01 PROCEDURE — 2580000003 HC RX 258: Performed by: RADIOLOGY

## 2018-01-01 PROCEDURE — 97110 THERAPEUTIC EXERCISES: CPT

## 2018-01-01 PROCEDURE — 0HBT0ZX EXCISION OF RIGHT BREAST, OPEN APPROACH, DIAGNOSTIC: ICD-10-PCS | Performed by: SURGERY

## 2018-01-01 PROCEDURE — 22511 PERQ LUMBOSACRAL INJECTION: CPT | Performed by: ORTHOPAEDIC SURGERY

## 2018-01-01 PROCEDURE — 0PU43JZ SUPPLEMENT THORACIC VERTEBRA WITH SYNTHETIC SUBSTITUTE, PERCUTANEOUS APPROACH: ICD-10-PCS | Performed by: ORTHOPAEDIC SURGERY

## 2018-01-01 PROCEDURE — 71046 X-RAY EXAM CHEST 2 VIEWS: CPT

## 2018-01-01 PROCEDURE — G8987 SELF CARE CURRENT STATUS: HCPCS

## 2018-01-01 PROCEDURE — 2709999900 HC NON-CHARGEABLE SUPPLY: Performed by: SURGERY

## 2018-01-01 PROCEDURE — 99999 PR OFFICE/OUTPT VISIT,PROCEDURE ONLY: CPT | Performed by: INTERNAL MEDICINE

## 2018-01-01 PROCEDURE — 84520 ASSAY OF UREA NITROGEN: CPT

## 2018-01-01 PROCEDURE — 73502 X-RAY EXAM HIP UNI 2-3 VIEWS: CPT

## 2018-01-01 PROCEDURE — 77387 GUIDANCE FOR RADJ TX DLVR: CPT | Performed by: RADIOLOGY

## 2018-01-01 PROCEDURE — 83615 LACTATE (LD) (LDH) ENZYME: CPT

## 2018-01-01 PROCEDURE — 2580000003 HC RX 258: Performed by: EMERGENCY MEDICINE

## 2018-01-01 PROCEDURE — 88305 TISSUE EXAM BY PATHOLOGIST: CPT

## 2018-01-01 PROCEDURE — 86140 C-REACTIVE PROTEIN: CPT

## 2018-01-01 PROCEDURE — 99221 1ST HOSP IP/OBS SF/LOW 40: CPT | Performed by: ORTHOPAEDIC SURGERY

## 2018-01-01 PROCEDURE — 20982 ABLATE BONE TUMOR(S) PERQ: CPT | Performed by: ORTHOPAEDIC SURGERY

## 2018-01-01 PROCEDURE — 72157 MRI CHEST SPINE W/O & W/DYE: CPT

## 2018-01-01 PROCEDURE — 2709999900 HC NON-CHARGEABLE SUPPLY: Performed by: ORTHOPAEDIC SURGERY

## 2018-01-01 PROCEDURE — 99283 EMERGENCY DEPT VISIT LOW MDM: CPT

## 2018-01-01 PROCEDURE — C1894 INTRO/SHEATH, NON-LASER: HCPCS | Performed by: SURGERY

## 2018-01-01 PROCEDURE — 88307 TISSUE EXAM BY PATHOLOGIST: CPT

## 2018-01-01 PROCEDURE — 72170 X-RAY EXAM OF PELVIS: CPT

## 2018-01-01 PROCEDURE — 51702 INSERT TEMP BLADDER CATH: CPT

## 2018-01-01 PROCEDURE — 99239 HOSP IP/OBS DSCHRG MGMT >30: CPT | Performed by: INTERNAL MEDICINE

## 2018-01-01 PROCEDURE — 70450 CT HEAD/BRAIN W/O DYE: CPT

## 2018-01-01 PROCEDURE — 81001 URINALYSIS AUTO W/SCOPE: CPT

## 2018-01-01 PROCEDURE — 97127 HC OT THER IVNTJ W/FOCUS COG FUNCJ: CPT

## 2018-01-01 PROCEDURE — 77290 THER RAD SIMULAJ FIELD CPLX: CPT | Performed by: RADIOLOGY

## 2018-01-01 PROCEDURE — 74177 CT ABD & PELVIS W/CONTRAST: CPT

## 2018-01-01 PROCEDURE — 0PS43ZZ REPOSITION THORACIC VERTEBRA, PERCUTANEOUS APPROACH: ICD-10-PCS | Performed by: ORTHOPAEDIC SURGERY

## 2018-01-01 PROCEDURE — 97116 GAIT TRAINING THERAPY: CPT

## 2018-01-01 PROCEDURE — 88377 M/PHMTRC ALYS ISHQUANT/SEMIQ: CPT

## 2018-01-01 PROCEDURE — 99211 OFF/OP EST MAY X REQ PHY/QHP: CPT

## 2018-01-01 PROCEDURE — 73560 X-RAY EXAM OF KNEE 1 OR 2: CPT

## 2018-01-01 PROCEDURE — 97166 OT EVAL MOD COMPLEX 45 MIN: CPT

## 2018-01-01 PROCEDURE — 0PB43ZX EXCISION OF THORACIC VERTEBRA, PERCUTANEOUS APPROACH, DIAGNOSTIC: ICD-10-PCS | Performed by: ORTHOPAEDIC SURGERY

## 2018-01-01 PROCEDURE — 88185 FLOWCYTOMETRY/TC ADD-ON: CPT

## 2018-01-01 PROCEDURE — C1713 ANCHOR/SCREW BN/BN,TIS/BN: HCPCS | Performed by: SURGERY

## 2018-01-01 PROCEDURE — 0QB03ZX EXCISION OF LUMBAR VERTEBRA, PERCUTANEOUS APPROACH, DIAGNOSTIC: ICD-10-PCS | Performed by: ORTHOPAEDIC SURGERY

## 2018-01-01 PROCEDURE — 97162 PT EVAL MOD COMPLEX 30 MIN: CPT

## 2018-01-01 PROCEDURE — 3600000002 HC SURGERY LEVEL 2 BASE: Performed by: SURGERY

## 2018-01-01 PROCEDURE — 7100000000 HC PACU RECOVERY - FIRST 15 MIN: Performed by: SURGERY

## 2018-01-01 PROCEDURE — 77307 TELETHX ISODOSE PLAN CPLX: CPT | Performed by: RADIOLOGY

## 2018-01-01 PROCEDURE — 82565 ASSAY OF CREATININE: CPT

## 2018-01-01 PROCEDURE — A9579 GAD-BASE MR CONTRAST NOS,1ML: HCPCS | Performed by: RADIOLOGY

## 2018-01-01 DEVICE — IMPLANTABLE DEVICE: Type: IMPLANTABLE DEVICE | Site: HIP | Status: FUNCTIONAL

## 2018-01-01 DEVICE — CEMENT C01A KYPHX HV-R BONE CEMENT EN
Type: IMPLANTABLE DEVICE | Site: SPINE LUMBAR | Status: FUNCTIONAL
Brand: KYPHON® HV-R® BONE CEMENT

## 2018-01-01 DEVICE — SCREW BNE L38MM DIA5MM TIB LT GRN TI ST CANN LOK FULL THRD: Type: IMPLANTABLE DEVICE | Site: HIP | Status: FUNCTIONAL

## 2018-01-01 DEVICE — SCREW BNE L40MM DIA5MM ST TIB LT GRN TI ST CANN LOK FULL: Type: IMPLANTABLE DEVICE | Site: HIP | Status: FUNCTIONAL

## 2018-01-01 RX ORDER — 0.9 % SODIUM CHLORIDE 0.9 %
1000 INTRAVENOUS SOLUTION INTRAVENOUS ONCE
Status: COMPLETED | OUTPATIENT
Start: 2018-01-01 | End: 2018-01-01

## 2018-01-01 RX ORDER — ONDANSETRON 2 MG/ML
INJECTION INTRAMUSCULAR; INTRAVENOUS PRN
Status: DISCONTINUED | OUTPATIENT
Start: 2018-01-01 | End: 2018-01-01 | Stop reason: SDUPTHER

## 2018-01-01 RX ORDER — AMLODIPINE BESYLATE 10 MG/1
10 TABLET ORAL DAILY
Status: DISCONTINUED | OUTPATIENT
Start: 2018-01-01 | End: 2018-01-01 | Stop reason: HOSPADM

## 2018-01-01 RX ORDER — SODIUM CHLORIDE 0.9 % (FLUSH) 0.9 %
10 SYRINGE (ML) INJECTION EVERY 12 HOURS SCHEDULED
Status: DISCONTINUED | OUTPATIENT
Start: 2018-01-01 | End: 2018-01-01 | Stop reason: HOSPADM

## 2018-01-01 RX ORDER — NEOSTIGMINE METHYLSULFATE 1 MG/ML
INJECTION, SOLUTION INTRAVENOUS PRN
Status: DISCONTINUED | OUTPATIENT
Start: 2018-01-01 | End: 2018-01-01 | Stop reason: SDUPTHER

## 2018-01-01 RX ORDER — LORAZEPAM 0.5 MG/1
0.5 TABLET ORAL EVERY 4 HOURS PRN
Status: DISCONTINUED | OUTPATIENT
Start: 2018-01-01 | End: 2018-01-01 | Stop reason: HOSPADM

## 2018-01-01 RX ORDER — LIDOCAINE HYDROCHLORIDE AND EPINEPHRINE 10; 10 MG/ML; UG/ML
INJECTION, SOLUTION INFILTRATION; PERINEURAL PRN
Status: DISCONTINUED | OUTPATIENT
Start: 2018-01-01 | End: 2018-01-01 | Stop reason: HOSPADM

## 2018-01-01 RX ORDER — DOCUSATE SODIUM 100 MG/1
100 CAPSULE, LIQUID FILLED ORAL 2 TIMES DAILY
Status: DISCONTINUED | OUTPATIENT
Start: 2018-01-01 | End: 2018-01-01 | Stop reason: HOSPADM

## 2018-01-01 RX ORDER — 0.9 % SODIUM CHLORIDE 0.9 %
250 INTRAVENOUS SOLUTION INTRAVENOUS ONCE
Status: CANCELLED | OUTPATIENT
Start: 2019-01-01 | End: 2019-01-01

## 2018-01-01 RX ORDER — ONDANSETRON 2 MG/ML
4 INJECTION INTRAMUSCULAR; INTRAVENOUS EVERY 6 HOURS PRN
Status: DISCONTINUED | OUTPATIENT
Start: 2018-01-01 | End: 2018-01-01 | Stop reason: HOSPADM

## 2018-01-01 RX ORDER — KETOROLAC TROMETHAMINE 30 MG/ML
INJECTION, SOLUTION INTRAMUSCULAR; INTRAVENOUS PRN
Status: DISCONTINUED | OUTPATIENT
Start: 2018-01-01 | End: 2018-01-01 | Stop reason: SDUPTHER

## 2018-01-01 RX ORDER — DEXAMETHASONE SODIUM PHOSPHATE 4 MG/ML
INJECTION, SOLUTION INTRA-ARTICULAR; INTRALESIONAL; INTRAMUSCULAR; INTRAVENOUS; SOFT TISSUE PRN
Status: DISCONTINUED | OUTPATIENT
Start: 2018-01-01 | End: 2018-01-01 | Stop reason: SDUPTHER

## 2018-01-01 RX ORDER — CEFAZOLIN SODIUM 1 G/3ML
INJECTION, POWDER, FOR SOLUTION INTRAMUSCULAR; INTRAVENOUS
Status: DISPENSED
Start: 2018-01-01 | End: 2018-01-01

## 2018-01-01 RX ORDER — ACETAMINOPHEN AND CODEINE PHOSPHATE 300; 30 MG/1; MG/1
1 TABLET ORAL EVERY 8 HOURS PRN
Qty: 10 TABLET | Refills: 0 | Status: SHIPPED | OUTPATIENT
Start: 2018-01-01 | End: 2018-01-01

## 2018-01-01 RX ORDER — METHYLPREDNISOLONE SODIUM SUCCINATE 125 MG/2ML
125 INJECTION, POWDER, LYOPHILIZED, FOR SOLUTION INTRAMUSCULAR; INTRAVENOUS ONCE
Status: CANCELLED | OUTPATIENT
Start: 2019-01-01 | End: 2019-01-01

## 2018-01-01 RX ORDER — SODIUM CHLORIDE 0.9 % (FLUSH) 0.9 %
10 SYRINGE (ML) INJECTION PRN
Status: DISCONTINUED | OUTPATIENT
Start: 2018-01-01 | End: 2018-01-01 | Stop reason: HOSPADM

## 2018-01-01 RX ORDER — SUCCINYLCHOLINE CHLORIDE 20 MG/ML
INJECTION INTRAMUSCULAR; INTRAVENOUS PRN
Status: DISCONTINUED | OUTPATIENT
Start: 2018-01-01 | End: 2018-01-01 | Stop reason: SDUPTHER

## 2018-01-01 RX ORDER — HYDRALAZINE HYDROCHLORIDE 20 MG/ML
5 INJECTION INTRAMUSCULAR; INTRAVENOUS EVERY 10 MIN PRN
Status: DISCONTINUED | OUTPATIENT
Start: 2018-01-01 | End: 2018-01-01 | Stop reason: HOSPADM

## 2018-01-01 RX ORDER — FENTANYL CITRATE 50 UG/ML
25 INJECTION, SOLUTION INTRAMUSCULAR; INTRAVENOUS EVERY 5 MIN PRN
Status: DISCONTINUED | OUTPATIENT
Start: 2018-01-01 | End: 2018-01-01 | Stop reason: HOSPADM

## 2018-01-01 RX ORDER — ACETAMINOPHEN 325 MG/1
650 TABLET ORAL EVERY 4 HOURS PRN
Status: DISCONTINUED | OUTPATIENT
Start: 2018-01-01 | End: 2018-01-01 | Stop reason: HOSPADM

## 2018-01-01 RX ORDER — SODIUM CHLORIDE 0.9 % (FLUSH) 0.9 %
5 SYRINGE (ML) INJECTION PRN
Status: CANCELLED | OUTPATIENT
Start: 2019-01-01

## 2018-01-01 RX ORDER — BUPIVACAINE HYDROCHLORIDE 5 MG/ML
INJECTION, SOLUTION EPIDURAL; INTRACAUDAL PRN
Status: DISCONTINUED | OUTPATIENT
Start: 2018-01-01 | End: 2018-01-01 | Stop reason: HOSPADM

## 2018-01-01 RX ORDER — LETROZOLE 2.5 MG/1
2.5 TABLET, FILM COATED ORAL DAILY
Qty: 30 TABLET | Refills: 3 | Status: SHIPPED | OUTPATIENT
Start: 2018-01-01 | End: 2018-01-01 | Stop reason: SDUPTHER

## 2018-01-01 RX ORDER — DIPHENHYDRAMINE HYDROCHLORIDE 50 MG/ML
12.5 INJECTION INTRAMUSCULAR; INTRAVENOUS
Status: DISCONTINUED | OUTPATIENT
Start: 2018-01-01 | End: 2018-01-01 | Stop reason: HOSPADM

## 2018-01-01 RX ORDER — LISINOPRIL 20 MG/1
20 TABLET ORAL DAILY
Qty: 30 TABLET | Refills: 3 | Status: SHIPPED | OUTPATIENT
Start: 2018-01-01 | End: 2018-01-01

## 2018-01-01 RX ORDER — IBUPROFEN 600 MG/1
600 TABLET ORAL ONCE
Status: COMPLETED | OUTPATIENT
Start: 2018-01-01 | End: 2018-01-01

## 2018-01-01 RX ORDER — MORPHINE SULFATE 2 MG/ML
2 INJECTION, SOLUTION INTRAMUSCULAR; INTRAVENOUS EVERY 5 MIN PRN
Status: DISCONTINUED | OUTPATIENT
Start: 2018-01-01 | End: 2018-01-01 | Stop reason: HOSPADM

## 2018-01-01 RX ORDER — MIDAZOLAM HYDROCHLORIDE 1 MG/ML
INJECTION INTRAMUSCULAR; INTRAVENOUS PRN
Status: DISCONTINUED | OUTPATIENT
Start: 2018-01-01 | End: 2018-01-01 | Stop reason: SDUPTHER

## 2018-01-01 RX ORDER — 0.9 % SODIUM CHLORIDE 0.9 %
10 VIAL (ML) INJECTION ONCE
Status: CANCELLED | OUTPATIENT
Start: 2019-01-01 | End: 2019-01-01

## 2018-01-01 RX ORDER — 0.9 % SODIUM CHLORIDE 0.9 %
80 INTRAVENOUS SOLUTION INTRAVENOUS ONCE
Status: COMPLETED | OUTPATIENT
Start: 2018-01-01 | End: 2018-01-01

## 2018-01-01 RX ORDER — AZITHROMYCIN 250 MG/1
TABLET, FILM COATED ORAL
Qty: 1 PACKET | Refills: 0 | Status: SHIPPED | OUTPATIENT
Start: 2018-01-01 | End: 2018-01-01 | Stop reason: CLARIF

## 2018-01-01 RX ORDER — PROPOFOL 10 MG/ML
INJECTION, EMULSION INTRAVENOUS PRN
Status: DISCONTINUED | OUTPATIENT
Start: 2018-01-01 | End: 2018-01-01 | Stop reason: SDUPTHER

## 2018-01-01 RX ORDER — HYDROCODONE BITARTRATE AND ACETAMINOPHEN 5; 325 MG/1; MG/1
1 TABLET ORAL PRN
Status: DISCONTINUED | OUTPATIENT
Start: 2018-01-01 | End: 2018-01-01 | Stop reason: HOSPADM

## 2018-01-01 RX ORDER — POTASSIUM CHLORIDE 20 MEQ/1
40 TABLET, EXTENDED RELEASE ORAL PRN
Status: DISCONTINUED | OUTPATIENT
Start: 2018-01-01 | End: 2018-01-01 | Stop reason: HOSPADM

## 2018-01-01 RX ORDER — MORPHINE SULFATE 2 MG/ML
1 INJECTION, SOLUTION INTRAMUSCULAR; INTRAVENOUS EVERY 5 MIN PRN
Status: DISCONTINUED | OUTPATIENT
Start: 2018-01-01 | End: 2018-01-01 | Stop reason: HOSPADM

## 2018-01-01 RX ORDER — HYDROCODONE BITARTRATE AND ACETAMINOPHEN 5; 325 MG/1; MG/1
1-2 TABLET ORAL EVERY 6 HOURS PRN
Qty: 42 TABLET | Refills: 0 | Status: SHIPPED | OUTPATIENT
Start: 2018-01-01 | End: 2018-01-01

## 2018-01-01 RX ORDER — PROMETHAZINE HYDROCHLORIDE 25 MG/ML
6.25 INJECTION, SOLUTION INTRAMUSCULAR; INTRAVENOUS
Status: DISCONTINUED | OUTPATIENT
Start: 2018-01-01 | End: 2018-01-01 | Stop reason: HOSPADM

## 2018-01-01 RX ORDER — MEPERIDINE HYDROCHLORIDE 50 MG/ML
12.5 INJECTION INTRAMUSCULAR; INTRAVENOUS; SUBCUTANEOUS EVERY 5 MIN PRN
Status: DISCONTINUED | OUTPATIENT
Start: 2018-01-01 | End: 2018-01-01 | Stop reason: HOSPADM

## 2018-01-01 RX ORDER — HYDROCODONE BITARTRATE AND ACETAMINOPHEN 5; 325 MG/1; MG/1
1 TABLET ORAL EVERY 6 HOURS PRN
COMMUNITY
End: 2018-01-01

## 2018-01-01 RX ORDER — 0.9 % SODIUM CHLORIDE 0.9 %
500 INTRAVENOUS SOLUTION INTRAVENOUS ONCE
Status: CANCELLED | OUTPATIENT
Start: 2019-01-01 | End: 2019-01-01

## 2018-01-01 RX ORDER — AMLODIPINE BESYLATE 10 MG/1
10 TABLET ORAL DAILY
Qty: 30 TABLET | Refills: 3 | Status: SHIPPED | OUTPATIENT
Start: 2018-01-01 | End: 2018-01-01 | Stop reason: ALTCHOICE

## 2018-01-01 RX ORDER — POTASSIUM CHLORIDE 20MEQ/15ML
40 LIQUID (ML) ORAL PRN
Status: DISCONTINUED | OUTPATIENT
Start: 2018-01-01 | End: 2018-01-01 | Stop reason: HOSPADM

## 2018-01-01 RX ORDER — PANTOPRAZOLE SODIUM 40 MG/1
40 TABLET, DELAYED RELEASE ORAL
Status: DISCONTINUED | OUTPATIENT
Start: 2018-01-01 | End: 2018-01-01 | Stop reason: HOSPADM

## 2018-01-01 RX ORDER — LIDOCAINE HYDROCHLORIDE 10 MG/ML
INJECTION, SOLUTION EPIDURAL; INFILTRATION; INTRACAUDAL; PERINEURAL PRN
Status: DISCONTINUED | OUTPATIENT
Start: 2018-01-01 | End: 2018-01-01 | Stop reason: SDUPTHER

## 2018-01-01 RX ORDER — SODIUM CHLORIDE 0.9 % (FLUSH) 0.9 %
10 SYRINGE (ML) INJECTION PRN
Status: DISCONTINUED | OUTPATIENT
Start: 2018-01-01 | End: 2018-01-01

## 2018-01-01 RX ORDER — MECLIZINE HYDROCHLORIDE CHEWABLE TABLETS 25 MG/1
25 TABLET, CHEWABLE ORAL 3 TIMES DAILY PRN
Qty: 30 TABLET | Refills: 0 | Status: SHIPPED | OUTPATIENT
Start: 2018-01-01 | End: 2018-01-01 | Stop reason: CLARIF

## 2018-01-01 RX ORDER — LISINOPRIL 20 MG/1
20 TABLET ORAL DAILY
Qty: 30 TABLET | Refills: 3 | Status: SHIPPED | OUTPATIENT
Start: 2018-01-01 | End: 2018-01-01 | Stop reason: SDUPTHER

## 2018-01-01 RX ORDER — GLYCOPYRROLATE 1 MG/5 ML
SYRINGE (ML) INTRAVENOUS PRN
Status: DISCONTINUED | OUTPATIENT
Start: 2018-01-01 | End: 2018-01-01 | Stop reason: SDUPTHER

## 2018-01-01 RX ORDER — HYDROCODONE BITARTRATE AND ACETAMINOPHEN 5; 325 MG/1; MG/1
2 TABLET ORAL EVERY 4 HOURS PRN
Status: DISCONTINUED | OUTPATIENT
Start: 2018-01-01 | End: 2018-01-01 | Stop reason: HOSPADM

## 2018-01-01 RX ORDER — DIPHENHYDRAMINE HYDROCHLORIDE 50 MG/ML
50 INJECTION INTRAMUSCULAR; INTRAVENOUS ONCE
Status: CANCELLED | OUTPATIENT
Start: 2019-01-01 | End: 2019-01-01

## 2018-01-01 RX ORDER — HYDROCODONE BITARTRATE AND ACETAMINOPHEN 5; 325 MG/1; MG/1
2 TABLET ORAL PRN
Status: DISCONTINUED | OUTPATIENT
Start: 2018-01-01 | End: 2018-01-01 | Stop reason: HOSPADM

## 2018-01-01 RX ORDER — LISINOPRIL 10 MG/1
10 TABLET ORAL DAILY
Qty: 30 TABLET | Refills: 0 | Status: SHIPPED | OUTPATIENT
Start: 2018-01-01 | End: 2018-01-01 | Stop reason: ALTCHOICE

## 2018-01-01 RX ORDER — SENNA PLUS 8.6 MG/1
1 TABLET ORAL NIGHTLY
Qty: 30 TABLET | Refills: 0 | Status: SHIPPED | OUTPATIENT
Start: 2018-01-01 | End: 2018-01-01

## 2018-01-01 RX ORDER — ZOLEDRONIC ACID 5 MG/100ML
5 INJECTION, SOLUTION INTRAVENOUS
Status: CANCELLED | OUTPATIENT
Start: 2019-01-01

## 2018-01-01 RX ORDER — OXYCODONE HYDROCHLORIDE AND ACETAMINOPHEN 5; 325 MG/1; MG/1
1 TABLET ORAL PRN
Status: DISCONTINUED | OUTPATIENT
Start: 2018-01-01 | End: 2018-01-01 | Stop reason: HOSPADM

## 2018-01-01 RX ORDER — HEPARIN SODIUM (PORCINE) LOCK FLUSH IV SOLN 100 UNIT/ML 100 UNIT/ML
500 SOLUTION INTRAVENOUS PRN
Status: CANCELLED | OUTPATIENT
Start: 2019-01-01

## 2018-01-01 RX ORDER — SENNA PLUS 8.6 MG/1
1 TABLET ORAL NIGHTLY
Status: DISCONTINUED | OUTPATIENT
Start: 2018-01-01 | End: 2018-01-01 | Stop reason: HOSPADM

## 2018-01-01 RX ORDER — LETROZOLE 2.5 MG/1
2.5 TABLET, FILM COATED ORAL DAILY
Qty: 30 TABLET | Refills: 0 | Status: SHIPPED | OUTPATIENT
Start: 2018-01-01 | End: 2018-01-01 | Stop reason: SDUPTHER

## 2018-01-01 RX ORDER — CEFAZOLIN SODIUM 1 G/50ML
INJECTION, SOLUTION INTRAVENOUS PRN
Status: DISCONTINUED | OUTPATIENT
Start: 2018-01-01 | End: 2018-01-01 | Stop reason: SDUPTHER

## 2018-01-01 RX ORDER — SODIUM CHLORIDE 0.9 % (FLUSH) 0.9 %
10 SYRINGE (ML) INJECTION PRN
Status: CANCELLED | OUTPATIENT
Start: 2019-01-01

## 2018-01-01 RX ORDER — PSEUDOEPHEDRINE HCL 30 MG
100 TABLET ORAL 2 TIMES DAILY PRN
Qty: 60 CAPSULE | Refills: 1 | Status: SHIPPED | OUTPATIENT
Start: 2018-01-01 | End: 2018-01-01

## 2018-01-01 RX ORDER — POTASSIUM CHLORIDE 7.45 MG/ML
10 INJECTION INTRAVENOUS PRN
Status: DISCONTINUED | OUTPATIENT
Start: 2018-01-01 | End: 2018-01-01 | Stop reason: HOSPADM

## 2018-01-01 RX ORDER — DEXAMETHASONE 4 MG/1
4 TABLET ORAL EVERY 6 HOURS SCHEDULED
Status: DISCONTINUED | OUTPATIENT
Start: 2018-01-01 | End: 2018-01-01 | Stop reason: HOSPADM

## 2018-01-01 RX ORDER — HYDROCODONE BITARTRATE AND ACETAMINOPHEN 5; 325 MG/1; MG/1
1 TABLET ORAL EVERY 6 HOURS PRN
Status: DISCONTINUED | OUTPATIENT
Start: 2018-01-01 | End: 2018-01-01 | Stop reason: HOSPADM

## 2018-01-01 RX ORDER — FENTANYL CITRATE 50 UG/ML
100 INJECTION, SOLUTION INTRAMUSCULAR; INTRAVENOUS
Status: DISCONTINUED | OUTPATIENT
Start: 2018-01-01 | End: 2018-01-01 | Stop reason: HOSPADM

## 2018-01-01 RX ORDER — TRANEXAMIC ACID 100 MG/ML
INJECTION, SOLUTION INTRAVENOUS PRN
Status: DISCONTINUED | OUTPATIENT
Start: 2018-01-01 | End: 2018-01-01 | Stop reason: SDUPTHER

## 2018-01-01 RX ORDER — FENTANYL CITRATE 50 UG/ML
INJECTION, SOLUTION INTRAMUSCULAR; INTRAVENOUS PRN
Status: DISCONTINUED | OUTPATIENT
Start: 2018-01-01 | End: 2018-01-01 | Stop reason: SDUPTHER

## 2018-01-01 RX ORDER — PANTOPRAZOLE SODIUM 40 MG/1
40 TABLET, DELAYED RELEASE ORAL DAILY
Qty: 30 TABLET | Refills: 3 | Status: SHIPPED | OUTPATIENT
Start: 2018-01-01 | End: 2018-01-01

## 2018-01-01 RX ORDER — LETROZOLE 2.5 MG/1
2.5 TABLET, FILM COATED ORAL DAILY
Qty: 30 TABLET | Refills: 3 | Status: SHIPPED | OUTPATIENT
Start: 2018-01-01

## 2018-01-01 RX ORDER — FENTANYL CITRATE 50 UG/ML
50 INJECTION, SOLUTION INTRAMUSCULAR; INTRAVENOUS
Status: DISCONTINUED | OUTPATIENT
Start: 2018-01-01 | End: 2018-01-01 | Stop reason: HOSPADM

## 2018-01-01 RX ORDER — ROCURONIUM BROMIDE 10 MG/ML
INJECTION, SOLUTION INTRAVENOUS PRN
Status: DISCONTINUED | OUTPATIENT
Start: 2018-01-01 | End: 2018-01-01 | Stop reason: SDUPTHER

## 2018-01-01 RX ORDER — OXYCODONE HYDROCHLORIDE AND ACETAMINOPHEN 5; 325 MG/1; MG/1
2 TABLET ORAL PRN
Status: DISCONTINUED | OUTPATIENT
Start: 2018-01-01 | End: 2018-01-01 | Stop reason: HOSPADM

## 2018-01-01 RX ORDER — LETROZOLE 2.5 MG/1
2.5 TABLET, FILM COATED ORAL DAILY
Status: DISCONTINUED | OUTPATIENT
Start: 2018-01-01 | End: 2018-01-01 | Stop reason: HOSPADM

## 2018-01-01 RX ORDER — SODIUM CHLORIDE 9 MG/ML
INJECTION, SOLUTION INTRAVENOUS CONTINUOUS
Status: CANCELLED | OUTPATIENT
Start: 2019-01-01

## 2018-01-01 RX ORDER — IBUPROFEN 200 MG
200 TABLET ORAL EVERY 6 HOURS PRN
COMMUNITY
End: 2018-01-01

## 2018-01-01 RX ORDER — AMLODIPINE BESYLATE 10 MG/1
10 TABLET ORAL DAILY
Qty: 30 TABLET | Refills: 0 | Status: SHIPPED | OUTPATIENT
Start: 2018-01-01 | End: 2018-01-01 | Stop reason: ALTCHOICE

## 2018-01-01 RX ORDER — LISINOPRIL 10 MG/1
10 TABLET ORAL DAILY
Status: DISCONTINUED | OUTPATIENT
Start: 2018-01-01 | End: 2018-01-01 | Stop reason: HOSPADM

## 2018-01-01 RX ORDER — FENTANYL CITRATE 50 UG/ML
25 INJECTION, SOLUTION INTRAMUSCULAR; INTRAVENOUS
Status: DISCONTINUED | OUTPATIENT
Start: 2018-01-01 | End: 2018-01-01 | Stop reason: HOSPADM

## 2018-01-01 RX ORDER — CEFAZOLIN SODIUM 1 G/3ML
INJECTION, POWDER, FOR SOLUTION INTRAMUSCULAR; INTRAVENOUS
Status: COMPLETED
Start: 2018-01-01 | End: 2018-01-01

## 2018-01-01 RX ORDER — SODIUM CHLORIDE 9 MG/ML
INJECTION, SOLUTION INTRAVENOUS CONTINUOUS
Status: DISCONTINUED | OUTPATIENT
Start: 2018-01-01 | End: 2018-01-01

## 2018-01-01 RX ORDER — 0.9 % SODIUM CHLORIDE 0.9 %
120 INTRAVENOUS SOLUTION INTRAVENOUS ONCE
Status: COMPLETED | OUTPATIENT
Start: 2018-01-01 | End: 2018-01-01

## 2018-01-01 RX ORDER — SODIUM CHLORIDE 9 MG/ML
INJECTION, SOLUTION INTRAVENOUS ONCE
Status: CANCELLED | OUTPATIENT
Start: 2019-01-01 | End: 2019-01-01

## 2018-01-01 RX ORDER — SODIUM CHLORIDE, SODIUM LACTATE, POTASSIUM CHLORIDE, CALCIUM CHLORIDE 600; 310; 30; 20 MG/100ML; MG/100ML; MG/100ML; MG/100ML
INJECTION, SOLUTION INTRAVENOUS CONTINUOUS
Status: DISCONTINUED | OUTPATIENT
Start: 2018-01-01 | End: 2018-01-01

## 2018-01-01 RX ADMIN — NEOSTIGMINE METHYLSULFATE 2 MG: 1 INJECTION, SOLUTION INTRAVENOUS at 17:48

## 2018-01-01 RX ADMIN — Medication 10 ML: at 16:21

## 2018-01-01 RX ADMIN — Medication 10 ML: at 21:37

## 2018-01-01 RX ADMIN — SODIUM CHLORIDE 1000 ML: 9 INJECTION, SOLUTION INTRAVENOUS at 18:00

## 2018-01-01 RX ADMIN — Medication 10 ML: at 11:36

## 2018-01-01 RX ADMIN — MIDAZOLAM 2 MG: 1 INJECTION INTRAMUSCULAR; INTRAVENOUS at 10:22

## 2018-01-01 RX ADMIN — SODIUM CHLORIDE 1000 ML: 9 INJECTION, SOLUTION INTRAVENOUS at 14:36

## 2018-01-01 RX ADMIN — DEXAMETHASONE 4 MG: 4 TABLET ORAL at 06:27

## 2018-01-01 RX ADMIN — DOCUSATE SODIUM 100 MG: 100 CAPSULE, LIQUID FILLED ORAL at 10:36

## 2018-01-01 RX ADMIN — AMLODIPINE BESYLATE 10 MG: 10 TABLET ORAL at 08:25

## 2018-01-01 RX ADMIN — PROPOFOL 150 MG: 10 INJECTION, EMULSION INTRAVENOUS at 10:30

## 2018-01-01 RX ADMIN — ROCURONIUM BROMIDE 40 MG: 10 INJECTION INTRAVENOUS at 10:41

## 2018-01-01 RX ADMIN — HYDROCODONE BITARTRATE AND ACETAMINOPHEN 1 TABLET: 5; 325 TABLET ORAL at 04:28

## 2018-01-01 RX ADMIN — HYDROCODONE BITARTRATE AND ACETAMINOPHEN 1 TABLET: 5; 325 TABLET ORAL at 12:17

## 2018-01-01 RX ADMIN — DEXAMETHASONE 4 MG: 4 TABLET ORAL at 13:32

## 2018-01-01 RX ADMIN — CEFAZOLIN 1 G: 1 INJECTION, POWDER, FOR SOLUTION INTRAMUSCULAR; INTRAVENOUS at 13:32

## 2018-01-01 RX ADMIN — DEXAMETHASONE 4 MG: 4 TABLET ORAL at 00:35

## 2018-01-01 RX ADMIN — SODIUM CHLORIDE, POTASSIUM CHLORIDE, SODIUM LACTATE AND CALCIUM CHLORIDE: 600; 310; 30; 20 INJECTION, SOLUTION INTRAVENOUS at 09:23

## 2018-01-01 RX ADMIN — ROCURONIUM BROMIDE 10 MG: 10 INJECTION INTRAVENOUS at 10:30

## 2018-01-01 RX ADMIN — FENTANYL CITRATE 100 MCG: 50 INJECTION, SOLUTION INTRAMUSCULAR; INTRAVENOUS at 10:42

## 2018-01-01 RX ADMIN — LETROZOLE 2.5 MG: 2.5 TABLET, FILM COATED ORAL at 13:58

## 2018-01-01 RX ADMIN — DOCUSATE SODIUM 100 MG: 100 CAPSULE, LIQUID FILLED ORAL at 20:56

## 2018-01-01 RX ADMIN — FENTANYL CITRATE 50 MCG: 50 INJECTION, SOLUTION INTRAMUSCULAR; INTRAVENOUS at 17:45

## 2018-01-01 RX ADMIN — ENOXAPARIN SODIUM 40 MG: 40 INJECTION SUBCUTANEOUS at 08:34

## 2018-01-01 RX ADMIN — SENNOSIDES 8.6 MG: 8.6 TABLET, FILM COATED ORAL at 20:36

## 2018-01-01 RX ADMIN — DOCUSATE SODIUM 100 MG: 100 CAPSULE, LIQUID FILLED ORAL at 08:34

## 2018-01-01 RX ADMIN — PANTOPRAZOLE SODIUM 40 MG: 40 TABLET, DELAYED RELEASE ORAL at 06:02

## 2018-01-01 RX ADMIN — DEXAMETHASONE 4 MG: 4 TABLET ORAL at 23:02

## 2018-01-01 RX ADMIN — DEXAMETHASONE SODIUM PHOSPHATE 8 MG: 4 INJECTION, SOLUTION INTRAMUSCULAR; INTRAVENOUS at 15:36

## 2018-01-01 RX ADMIN — Medication 140 MG: at 10:32

## 2018-01-01 RX ADMIN — IOPAMIDOL 100 ML: 755 INJECTION, SOLUTION INTRAVENOUS at 09:15

## 2018-01-01 RX ADMIN — CEFAZOLIN: 1 INJECTION, POWDER, FOR SOLUTION INTRAMUSCULAR; INTRAVENOUS at 14:48

## 2018-01-01 RX ADMIN — FENTANYL CITRATE 50 MCG: 50 INJECTION, SOLUTION INTRAMUSCULAR; INTRAVENOUS at 13:59

## 2018-01-01 RX ADMIN — Medication 10 ML: at 21:10

## 2018-01-01 RX ADMIN — SODIUM CHLORIDE, POTASSIUM CHLORIDE, SODIUM LACTATE AND CALCIUM CHLORIDE: 600; 310; 30; 20 INJECTION, SOLUTION INTRAVENOUS at 05:58

## 2018-01-01 RX ADMIN — GADOTERIDOL 10 ML: 279.3 INJECTION, SOLUTION INTRAVENOUS at 16:21

## 2018-01-01 RX ADMIN — SODIUM CHLORIDE, POTASSIUM CHLORIDE, SODIUM LACTATE AND CALCIUM CHLORIDE: 600; 310; 30; 20 INJECTION, SOLUTION INTRAVENOUS at 15:10

## 2018-01-01 RX ADMIN — IOHEXOL 50 ML: 240 INJECTION, SOLUTION INTRATHECAL; INTRAVASCULAR; INTRAVENOUS; ORAL at 09:15

## 2018-01-01 RX ADMIN — SODIUM CHLORIDE, POTASSIUM CHLORIDE, SODIUM LACTATE AND CALCIUM CHLORIDE: 600; 310; 30; 20 INJECTION, SOLUTION INTRAVENOUS at 17:53

## 2018-01-01 RX ADMIN — DEXAMETHASONE 4 MG: 4 TABLET ORAL at 23:45

## 2018-01-01 RX ADMIN — PHENYLEPHRINE HYDROCHLORIDE 200 MCG: 10 INJECTION INTRAVENOUS at 10:53

## 2018-01-01 RX ADMIN — DEXAMETHASONE 4 MG: 4 TABLET ORAL at 05:40

## 2018-01-01 RX ADMIN — DEXAMETHASONE 4 MG: 4 TABLET ORAL at 17:39

## 2018-01-01 RX ADMIN — DOCUSATE SODIUM 100 MG: 100 CAPSULE, LIQUID FILLED ORAL at 22:17

## 2018-01-01 RX ADMIN — PANTOPRAZOLE SODIUM 40 MG: 40 TABLET, DELAYED RELEASE ORAL at 05:40

## 2018-01-01 RX ADMIN — Medication 10 ML: at 19:38

## 2018-01-01 RX ADMIN — ENOXAPARIN SODIUM 40 MG: 40 INJECTION SUBCUTANEOUS at 13:58

## 2018-01-01 RX ADMIN — HYDROCODONE BITARTRATE AND ACETAMINOPHEN 1 TABLET: 5; 325 TABLET ORAL at 12:03

## 2018-01-01 RX ADMIN — ENOXAPARIN SODIUM 40 MG: 40 INJECTION SUBCUTANEOUS at 10:37

## 2018-01-01 RX ADMIN — HYDROCODONE BITARTRATE AND ACETAMINOPHEN 1 TABLET: 5; 325 TABLET ORAL at 17:39

## 2018-01-01 RX ADMIN — DEXAMETHASONE 4 MG: 4 TABLET ORAL at 11:30

## 2018-01-01 RX ADMIN — IBUPROFEN 600 MG: 600 TABLET ORAL at 11:48

## 2018-01-01 RX ADMIN — HYDROCODONE BITARTRATE AND ACETAMINOPHEN 1 TABLET: 5; 325 TABLET ORAL at 22:26

## 2018-01-01 RX ADMIN — MIDAZOLAM 2 MG: 1 INJECTION INTRAMUSCULAR; INTRAVENOUS at 15:10

## 2018-01-01 RX ADMIN — SENNOSIDES 8.6 MG: 8.6 TABLET, FILM COATED ORAL at 20:56

## 2018-01-01 RX ADMIN — SENNOSIDES 8.6 MG: 8.6 TABLET, FILM COATED ORAL at 22:17

## 2018-01-01 RX ADMIN — LISINOPRIL 10 MG: 10 TABLET ORAL at 08:34

## 2018-01-01 RX ADMIN — DOCUSATE SODIUM 100 MG: 100 CAPSULE, LIQUID FILLED ORAL at 20:36

## 2018-01-01 RX ADMIN — GADOTERIDOL 13 ML: 279.3 INJECTION, SOLUTION INTRAVENOUS at 19:39

## 2018-01-01 RX ADMIN — FENTANYL CITRATE 50 MCG: 50 INJECTION, SOLUTION INTRAMUSCULAR; INTRAVENOUS at 22:49

## 2018-01-01 RX ADMIN — FENTANYL CITRATE 50 MCG: 50 INJECTION, SOLUTION INTRAMUSCULAR; INTRAVENOUS at 17:12

## 2018-01-01 RX ADMIN — FENTANYL CITRATE 50 MCG: 50 INJECTION, SOLUTION INTRAMUSCULAR; INTRAVENOUS at 05:09

## 2018-01-01 RX ADMIN — FENTANYL CITRATE 50 MCG: 50 INJECTION, SOLUTION INTRAMUSCULAR; INTRAVENOUS at 12:51

## 2018-01-01 RX ADMIN — DEXAMETHASONE 4 MG: 4 TABLET ORAL at 11:55

## 2018-01-01 RX ADMIN — CEFAZOLIN 1 G: 1 INJECTION, POWDER, FOR SOLUTION INTRAMUSCULAR; INTRAVENOUS at 05:57

## 2018-01-01 RX ADMIN — FENTANYL CITRATE 25 MCG: 50 INJECTION, SOLUTION INTRAMUSCULAR; INTRAVENOUS at 05:16

## 2018-01-01 RX ADMIN — AMLODIPINE BESYLATE 10 MG: 10 TABLET ORAL at 07:32

## 2018-01-01 RX ADMIN — LIDOCAINE HYDROCHLORIDE 50 MG: 10 INJECTION, SOLUTION EPIDURAL; INFILTRATION; INTRACAUDAL; PERINEURAL at 10:30

## 2018-01-01 RX ADMIN — PHENYLEPHRINE HYDROCHLORIDE 100 MCG: 10 INJECTION INTRAVENOUS at 16:40

## 2018-01-01 RX ADMIN — DEXAMETHASONE 4 MG: 4 TABLET ORAL at 00:53

## 2018-01-01 RX ADMIN — PHENYLEPHRINE HYDROCHLORIDE 100 MCG: 10 INJECTION INTRAVENOUS at 15:25

## 2018-01-01 RX ADMIN — PHENYLEPHRINE HYDROCHLORIDE 100 MCG: 10 INJECTION INTRAVENOUS at 13:26

## 2018-01-01 RX ADMIN — DEXAMETHASONE 4 MG: 4 TABLET ORAL at 06:42

## 2018-01-01 RX ADMIN — LORAZEPAM 0.5 MG: 0.5 TABLET ORAL at 16:37

## 2018-01-01 RX ADMIN — IOPAMIDOL 150 ML: 755 INJECTION, SOLUTION INTRAVENOUS at 11:36

## 2018-01-01 RX ADMIN — Medication 0.4 MG: at 17:49

## 2018-01-01 RX ADMIN — LISINOPRIL 10 MG: 10 TABLET ORAL at 08:25

## 2018-01-01 RX ADMIN — CEFAZOLIN SODIUM 2 G: 1 INJECTION, SOLUTION INTRAVENOUS at 15:20

## 2018-01-01 RX ADMIN — DOCUSATE SODIUM 100 MG: 100 CAPSULE, LIQUID FILLED ORAL at 21:36

## 2018-01-01 RX ADMIN — KETOROLAC TROMETHAMINE 30 MG: 30 INJECTION, SOLUTION INTRAMUSCULAR at 17:51

## 2018-01-01 RX ADMIN — KETOROLAC TROMETHAMINE 30 MG: 30 INJECTION, SOLUTION INTRAMUSCULAR at 13:27

## 2018-01-01 RX ADMIN — AMLODIPINE BESYLATE 10 MG: 10 TABLET ORAL at 10:37

## 2018-01-01 RX ADMIN — POTASSIUM CHLORIDE 40 MEQ: 1500 TABLET, EXTENDED RELEASE ORAL at 17:53

## 2018-01-01 RX ADMIN — DEXAMETHASONE 4 MG: 4 TABLET ORAL at 17:41

## 2018-01-01 RX ADMIN — ENOXAPARIN SODIUM 40 MG: 40 INJECTION SUBCUTANEOUS at 08:25

## 2018-01-01 RX ADMIN — PHENYLEPHRINE HYDROCHLORIDE 100 MCG: 10 INJECTION INTRAVENOUS at 10:49

## 2018-01-01 RX ADMIN — PHENYLEPHRINE HYDROCHLORIDE 100 MCG: 10 INJECTION INTRAVENOUS at 12:27

## 2018-01-01 RX ADMIN — HYDROCODONE BITARTRATE AND ACETAMINOPHEN 1 TABLET: 5; 325 TABLET ORAL at 00:01

## 2018-01-01 RX ADMIN — LISINOPRIL 10 MG: 10 TABLET ORAL at 11:10

## 2018-01-01 RX ADMIN — PANTOPRAZOLE SODIUM 40 MG: 40 TABLET, DELAYED RELEASE ORAL at 06:42

## 2018-01-01 RX ADMIN — HYDROCODONE BITARTRATE AND ACETAMINOPHEN 1 TABLET: 5; 325 TABLET ORAL at 10:36

## 2018-01-01 RX ADMIN — CEFAZOLIN 1 G: 1 INJECTION, POWDER, FOR SOLUTION INTRAMUSCULAR; INTRAVENOUS at 22:31

## 2018-01-01 RX ADMIN — DEXAMETHASONE 4 MG: 4 TABLET ORAL at 18:09

## 2018-01-01 RX ADMIN — AMLODIPINE BESYLATE 10 MG: 10 TABLET ORAL at 14:58

## 2018-01-01 RX ADMIN — FENTANYL CITRATE 100 MCG: 50 INJECTION, SOLUTION INTRAMUSCULAR; INTRAVENOUS at 15:13

## 2018-01-01 RX ADMIN — Medication 10 ML: at 09:16

## 2018-01-01 RX ADMIN — DEXAMETHASONE SODIUM PHOSPHATE 8 MG: 4 INJECTION, SOLUTION INTRAMUSCULAR; INTRAVENOUS at 10:53

## 2018-01-01 RX ADMIN — TRANEXAMIC ACID 1000 MG: 100 INJECTION, SOLUTION INTRAVENOUS at 10:56

## 2018-01-01 RX ADMIN — LISINOPRIL 10 MG: 10 TABLET ORAL at 10:37

## 2018-01-01 RX ADMIN — SODIUM CHLORIDE, POTASSIUM CHLORIDE, SODIUM LACTATE AND CALCIUM CHLORIDE: 600; 310; 30; 20 INJECTION, SOLUTION INTRAVENOUS at 13:38

## 2018-01-01 RX ADMIN — FENTANYL CITRATE 100 MCG: 50 INJECTION, SOLUTION INTRAMUSCULAR; INTRAVENOUS at 23:33

## 2018-01-01 RX ADMIN — FENTANYL CITRATE 50 MCG: 50 INJECTION, SOLUTION INTRAMUSCULAR; INTRAVENOUS at 16:38

## 2018-01-01 RX ADMIN — ONDANSETRON 4 MG: 2 INJECTION INTRAMUSCULAR; INTRAVENOUS at 17:44

## 2018-01-01 RX ADMIN — PROPOFOL 150 MG: 10 INJECTION, EMULSION INTRAVENOUS at 15:13

## 2018-01-01 RX ADMIN — AMLODIPINE BESYLATE 10 MG: 10 TABLET ORAL at 08:34

## 2018-01-01 RX ADMIN — SODIUM CHLORIDE, POTASSIUM CHLORIDE, SODIUM LACTATE AND CALCIUM CHLORIDE: 600; 310; 30; 20 INJECTION, SOLUTION INTRAVENOUS at 19:14

## 2018-01-01 RX ADMIN — DEXAMETHASONE 4 MG: 4 TABLET ORAL at 00:01

## 2018-01-01 RX ADMIN — SODIUM CHLORIDE: 9 INJECTION, SOLUTION INTRAVENOUS at 13:50

## 2018-01-01 RX ADMIN — PHENYLEPHRINE HYDROCHLORIDE 100 MCG: 10 INJECTION INTRAVENOUS at 17:25

## 2018-01-01 RX ADMIN — PROPOFOL 50 MG: 10 INJECTION, EMULSION INTRAVENOUS at 10:35

## 2018-01-01 RX ADMIN — SODIUM CHLORIDE: 9 INJECTION, SOLUTION INTRAVENOUS at 05:17

## 2018-01-01 RX ADMIN — ROCURONIUM BROMIDE 40 MG: 10 INJECTION INTRAVENOUS at 15:13

## 2018-01-01 RX ADMIN — DEXAMETHASONE 4 MG: 4 TABLET ORAL at 05:10

## 2018-01-01 RX ADMIN — SODIUM CHLORIDE 120 ML: 9 INJECTION, SOLUTION INTRAVENOUS at 11:37

## 2018-01-01 RX ADMIN — FENTANYL CITRATE 25 MCG: 50 INJECTION, SOLUTION INTRAMUSCULAR; INTRAVENOUS at 19:36

## 2018-01-01 RX ADMIN — SODIUM CHLORIDE 80 ML: 9 INJECTION, SOLUTION INTRAVENOUS at 09:15

## 2018-01-01 RX ADMIN — DOCUSATE SODIUM 100 MG: 100 CAPSULE, LIQUID FILLED ORAL at 08:25

## 2018-01-01 RX ADMIN — ONDANSETRON 4 MG: 2 INJECTION INTRAMUSCULAR; INTRAVENOUS at 13:27

## 2018-01-01 RX ADMIN — FENTANYL CITRATE 50 MCG: 50 INJECTION, SOLUTION INTRAMUSCULAR; INTRAVENOUS at 21:36

## 2018-01-01 RX ADMIN — SODIUM CHLORIDE, POTASSIUM CHLORIDE, SODIUM LACTATE AND CALCIUM CHLORIDE: 600; 310; 30; 20 INJECTION, SOLUTION INTRAVENOUS at 05:10

## 2018-01-01 RX ADMIN — LETROZOLE 2.5 MG: 2.5 TABLET, FILM COATED ORAL at 10:59

## 2018-01-01 ASSESSMENT — PAIN DESCRIPTION - PAIN TYPE
TYPE: CHRONIC PAIN;ACUTE PAIN;SURGICAL PAIN
TYPE: ACUTE PAIN
TYPE: SURGICAL PAIN
TYPE: ACUTE PAIN
TYPE: SURGICAL PAIN
TYPE: ACUTE PAIN;SURGICAL PAIN
TYPE: ACUTE PAIN
TYPE: SURGICAL PAIN

## 2018-01-01 ASSESSMENT — PULMONARY FUNCTION TESTS
PIF_VALUE: 21
PIF_VALUE: 3
PIF_VALUE: 17
PIF_VALUE: 22
PIF_VALUE: 16
PIF_VALUE: 23
PIF_VALUE: 30
PIF_VALUE: 22
PIF_VALUE: 21
PIF_VALUE: 22
PIF_VALUE: 21
PIF_VALUE: 10
PIF_VALUE: 22
PIF_VALUE: 24
PIF_VALUE: 22
PIF_VALUE: 21
PIF_VALUE: 25
PIF_VALUE: 16
PIF_VALUE: 22
PIF_VALUE: 21
PIF_VALUE: 22
PIF_VALUE: 21
PIF_VALUE: 22
PIF_VALUE: 22
PIF_VALUE: 20
PIF_VALUE: 21
PIF_VALUE: 22
PIF_VALUE: 22
PIF_VALUE: 25
PIF_VALUE: 10
PIF_VALUE: 21
PIF_VALUE: 22
PIF_VALUE: 22
PIF_VALUE: 21
PIF_VALUE: 22
PIF_VALUE: 19
PIF_VALUE: 21
PIF_VALUE: 21
PIF_VALUE: 10
PIF_VALUE: 22
PIF_VALUE: 21
PIF_VALUE: 17
PIF_VALUE: 0
PIF_VALUE: 22
PIF_VALUE: 23
PIF_VALUE: 21
PIF_VALUE: 21
PIF_VALUE: 24
PIF_VALUE: 11
PIF_VALUE: 21
PIF_VALUE: 22
PIF_VALUE: 21
PIF_VALUE: 17
PIF_VALUE: 21
PIF_VALUE: 21
PIF_VALUE: 20
PIF_VALUE: 20
PIF_VALUE: 21
PIF_VALUE: 22
PIF_VALUE: 11
PIF_VALUE: 21
PIF_VALUE: 21
PIF_VALUE: 1
PIF_VALUE: 25
PIF_VALUE: 17
PIF_VALUE: 22
PIF_VALUE: 8
PIF_VALUE: 21
PIF_VALUE: 21
PIF_VALUE: 19
PIF_VALUE: 21
PIF_VALUE: 24
PIF_VALUE: 21
PIF_VALUE: 21
PIF_VALUE: 22
PIF_VALUE: 21
PIF_VALUE: 24
PIF_VALUE: 10
PIF_VALUE: 16
PIF_VALUE: 21
PIF_VALUE: 1
PIF_VALUE: 25
PIF_VALUE: 23
PIF_VALUE: 1
PIF_VALUE: 21
PIF_VALUE: 22
PIF_VALUE: 21
PIF_VALUE: 21
PIF_VALUE: 3
PIF_VALUE: 21
PIF_VALUE: 21
PIF_VALUE: 11
PIF_VALUE: 21
PIF_VALUE: 23
PIF_VALUE: 21
PIF_VALUE: 22
PIF_VALUE: 18
PIF_VALUE: 21
PIF_VALUE: 20
PIF_VALUE: 21
PIF_VALUE: 21
PIF_VALUE: 12
PIF_VALUE: 21
PIF_VALUE: 21
PIF_VALUE: 1
PIF_VALUE: 22
PIF_VALUE: 21
PIF_VALUE: 17
PIF_VALUE: 21
PIF_VALUE: 22
PIF_VALUE: 21
PIF_VALUE: 21
PIF_VALUE: 2
PIF_VALUE: 21
PIF_VALUE: 21
PIF_VALUE: 23
PIF_VALUE: 21
PIF_VALUE: 21
PIF_VALUE: 2
PIF_VALUE: 21
PIF_VALUE: 25
PIF_VALUE: 21
PIF_VALUE: 21
PIF_VALUE: 25
PIF_VALUE: 21
PIF_VALUE: 25
PIF_VALUE: 21
PIF_VALUE: 17
PIF_VALUE: 16
PIF_VALUE: 21
PIF_VALUE: 12
PIF_VALUE: 22
PIF_VALUE: 17
PIF_VALUE: 22
PIF_VALUE: 3
PIF_VALUE: 2
PIF_VALUE: 22
PIF_VALUE: 18
PIF_VALUE: 23
PIF_VALUE: 11
PIF_VALUE: 25
PIF_VALUE: 18
PIF_VALUE: 33
PIF_VALUE: 2
PIF_VALUE: 16
PIF_VALUE: 21
PIF_VALUE: 22
PIF_VALUE: 4
PIF_VALUE: 21
PIF_VALUE: 5
PIF_VALUE: 21
PIF_VALUE: 21
PIF_VALUE: 10
PIF_VALUE: 21
PIF_VALUE: 24
PIF_VALUE: 21
PIF_VALUE: 3
PIF_VALUE: 22
PIF_VALUE: 22
PIF_VALUE: 20
PIF_VALUE: 21
PIF_VALUE: 20
PIF_VALUE: 21
PIF_VALUE: 16
PIF_VALUE: 21
PIF_VALUE: 3
PIF_VALUE: 5
PIF_VALUE: 21
PIF_VALUE: 23
PIF_VALUE: 21
PIF_VALUE: 22
PIF_VALUE: 20
PIF_VALUE: 21
PIF_VALUE: 22
PIF_VALUE: 21
PIF_VALUE: 11
PIF_VALUE: 20
PIF_VALUE: 22
PIF_VALUE: 23
PIF_VALUE: 22
PIF_VALUE: 20
PIF_VALUE: 21
PIF_VALUE: 2
PIF_VALUE: 21
PIF_VALUE: 1
PIF_VALUE: 16
PIF_VALUE: 21
PIF_VALUE: 21
PIF_VALUE: 22
PIF_VALUE: 22
PIF_VALUE: 21
PIF_VALUE: 21
PIF_VALUE: 22
PIF_VALUE: 22
PIF_VALUE: 21
PIF_VALUE: 17
PIF_VALUE: 22
PIF_VALUE: 20
PIF_VALUE: 21
PIF_VALUE: 21
PIF_VALUE: 34
PIF_VALUE: 10
PIF_VALUE: 21
PIF_VALUE: 16
PIF_VALUE: 23
PIF_VALUE: 21
PIF_VALUE: 11
PIF_VALUE: 16
PIF_VALUE: 22
PIF_VALUE: 19
PIF_VALUE: 21
PIF_VALUE: 21
PIF_VALUE: 16
PIF_VALUE: 21
PIF_VALUE: 18
PIF_VALUE: 21
PIF_VALUE: 22
PIF_VALUE: 21
PIF_VALUE: 3
PIF_VALUE: 22
PIF_VALUE: 21
PIF_VALUE: 1
PIF_VALUE: 22
PIF_VALUE: 22
PIF_VALUE: 21
PIF_VALUE: 22
PIF_VALUE: 22
PIF_VALUE: 17
PIF_VALUE: 22
PIF_VALUE: 21
PIF_VALUE: 21
PIF_VALUE: 3
PIF_VALUE: 21
PIF_VALUE: 22
PIF_VALUE: 21
PIF_VALUE: 23
PIF_VALUE: 11
PIF_VALUE: 22
PIF_VALUE: 21
PIF_VALUE: 22
PIF_VALUE: 21
PIF_VALUE: 25
PIF_VALUE: 21
PIF_VALUE: 22
PIF_VALUE: 21
PIF_VALUE: 21
PIF_VALUE: 22
PIF_VALUE: 20
PIF_VALUE: 7
PIF_VALUE: 21
PIF_VALUE: 4
PIF_VALUE: 17
PIF_VALUE: 21
PIF_VALUE: 22
PIF_VALUE: 23
PIF_VALUE: 20
PIF_VALUE: 21
PIF_VALUE: 20
PIF_VALUE: 21
PIF_VALUE: 30
PIF_VALUE: 21
PIF_VALUE: 10
PIF_VALUE: 21
PIF_VALUE: 20
PIF_VALUE: 26
PIF_VALUE: 22
PIF_VALUE: 0
PIF_VALUE: 21
PIF_VALUE: 23
PIF_VALUE: 22
PIF_VALUE: 21
PIF_VALUE: 25
PIF_VALUE: 21
PIF_VALUE: 16
PIF_VALUE: 12
PIF_VALUE: 21
PIF_VALUE: 24
PIF_VALUE: 21
PIF_VALUE: 22
PIF_VALUE: 21
PIF_VALUE: 1
PIF_VALUE: 23
PIF_VALUE: 22
PIF_VALUE: 22
PIF_VALUE: 21
PIF_VALUE: 2
PIF_VALUE: 21
PIF_VALUE: 11
PIF_VALUE: 21
PIF_VALUE: 24
PIF_VALUE: 20
PIF_VALUE: 21
PIF_VALUE: 21
PIF_VALUE: 2
PIF_VALUE: 23
PIF_VALUE: 22
PIF_VALUE: 21
PIF_VALUE: 12
PIF_VALUE: 3
PIF_VALUE: 24
PIF_VALUE: 22
PIF_VALUE: 18
PIF_VALUE: 21
PIF_VALUE: 21
PIF_VALUE: 3
PIF_VALUE: 21
PIF_VALUE: 21
PIF_VALUE: 22
PIF_VALUE: 24
PIF_VALUE: 3
PIF_VALUE: 0
PIF_VALUE: 25
PIF_VALUE: 19
PIF_VALUE: 21
PIF_VALUE: 22
PIF_VALUE: 22

## 2018-01-01 ASSESSMENT — ENCOUNTER SYMPTOMS
COUGH: 0
CONSTIPATION: 0
BACK PAIN: 0
BACK PAIN: 1
ABDOMINAL PAIN: 0
COUGH: 0
COUGH: 1
ABDOMINAL PAIN: 0
COUGH: 0
NAUSEA: 1
BLOOD IN STOOL: 0
COLOR CHANGE: 0
COLOR CHANGE: 1
DIARRHEA: 0
DIARRHEA: 0
SHORTNESS OF BREATH: 0
DIARRHEA: 0
SPUTUM PRODUCTION: 0
CHEST TIGHTNESS: 0
VOMITING: 0
HEARTBURN: 0
SPUTUM PRODUCTION: 0
BACK PAIN: 0
ABDOMINAL PAIN: 0
NAUSEA: 0
BLOOD IN STOOL: 0
ABDOMINAL PAIN: 0
VOMITING: 0
EYE PAIN: 0
WHEEZING: 0
HEMOPTYSIS: 0
ABDOMINAL PAIN: 1
EYE DISCHARGE: 0
COUGH: 0
ABDOMINAL PAIN: 0
SORE THROAT: 0
EYE DISCHARGE: 0
VOMITING: 0
ABDOMINAL PAIN: 0
VOMITING: 0
SHORTNESS OF BREATH: 0
ABDOMINAL DISTENTION: 0
EYE REDNESS: 0
BLOOD IN STOOL: 0
BLURRED VISION: 0
SHORTNESS OF BREATH: 0
SHORTNESS OF BREATH: 0
CONSTIPATION: 0
DIARRHEA: 0
SHORTNESS OF BREATH: 0
EYE REDNESS: 0
SPUTUM PRODUCTION: 0
NAUSEA: 0
SHORTNESS OF BREATH: 0
CONSTIPATION: 0
APNEA: 0
SHORTNESS OF BREATH: 0
NAUSEA: 0
EYE ITCHING: 0
NAUSEA: 0
NAUSEA: 0
BACK PAIN: 1
RHINORRHEA: 0
VOMITING: 0
NAUSEA: 0
COUGH: 0
SHORTNESS OF BREATH: 0
COUGH: 0
SHORTNESS OF BREATH: 0
TROUBLE SWALLOWING: 0
WHEEZING: 0
VOMITING: 0
NAUSEA: 0
DIARRHEA: 0
CONSTIPATION: 0
CHOKING: 0
EYE PAIN: 0
WHEEZING: 0
DIARRHEA: 0
WHEEZING: 0
VOMITING: 0
COUGH: 0
DOUBLE VISION: 0
COUGH: 0
WHEEZING: 0
VOMITING: 0
ABDOMINAL PAIN: 0
COUGH: 0
EYE PAIN: 0
WHEEZING: 0
DIARRHEA: 0
ABDOMINAL PAIN: 1

## 2018-01-01 ASSESSMENT — PAIN DESCRIPTION - DESCRIPTORS
DESCRIPTORS: CRAMPING
DESCRIPTORS: CRAMPING
DESCRIPTORS: ACHING
DESCRIPTORS: CRAMPING
DESCRIPTORS: DISCOMFORT;SORE;ACHING
DESCRIPTORS: ACHING;THROBBING
DESCRIPTORS: CRAMPING
DESCRIPTORS: ACHING;CRAMPING

## 2018-01-01 ASSESSMENT — PAIN SCALES - GENERAL
PAINLEVEL_OUTOF10: 4
PAINLEVEL_OUTOF10: 4
PAINLEVEL_OUTOF10: 0
PAINLEVEL_OUTOF10: 6
PAINLEVEL_OUTOF10: 0
PAINLEVEL_OUTOF10: 6
PAINLEVEL_OUTOF10: 0
PAINLEVEL_OUTOF10: 6
PAINLEVEL_OUTOF10: 0
PAINLEVEL_OUTOF10: 0
PAINLEVEL_OUTOF10: 2
PAINLEVEL_OUTOF10: 6
PAINLEVEL_OUTOF10: 5
PAINLEVEL_OUTOF10: 0
PAINLEVEL_OUTOF10: 10
PAINLEVEL_OUTOF10: 2
PAINLEVEL_OUTOF10: 0
PAINLEVEL_OUTOF10: 10
PAINLEVEL_OUTOF10: 0
PAINLEVEL_OUTOF10: 0
PAINLEVEL_OUTOF10: 8
PAINLEVEL_OUTOF10: 0
PAINLEVEL_OUTOF10: 0
PAINLEVEL_OUTOF10: 8
PAINLEVEL_OUTOF10: 0
PAINLEVEL_OUTOF10: 5
PAINLEVEL_OUTOF10: 5
PAINLEVEL_OUTOF10: 2
PAINLEVEL_OUTOF10: 1
PAINLEVEL_OUTOF10: 2
PAINLEVEL_OUTOF10: 0
PAINLEVEL_OUTOF10: 0
PAINLEVEL_OUTOF10: 5
PAINLEVEL_OUTOF10: 6
PAINLEVEL_OUTOF10: 0
PAINLEVEL_OUTOF10: 6
PAINLEVEL_OUTOF10: 5
PAINLEVEL_OUTOF10: 4
PAINLEVEL_OUTOF10: 0
PAINLEVEL_OUTOF10: 8

## 2018-01-01 ASSESSMENT — PAIN DESCRIPTION - ORIENTATION
ORIENTATION: RIGHT;LEFT
ORIENTATION: LEFT
ORIENTATION: RIGHT;LEFT
ORIENTATION: LEFT
ORIENTATION: LEFT
ORIENTATION: RIGHT;LEFT
ORIENTATION: LEFT
ORIENTATION: RIGHT
ORIENTATION: RIGHT;LEFT
ORIENTATION: RIGHT;LEFT

## 2018-01-01 ASSESSMENT — PATIENT HEALTH QUESTIONNAIRE - PHQ9
SUM OF ALL RESPONSES TO PHQ QUESTIONS 1-9: 0
SUM OF ALL RESPONSES TO PHQ9 QUESTIONS 1 & 2: 0
1. LITTLE INTEREST OR PLEASURE IN DOING THINGS: 0
2. FEELING DOWN, DEPRESSED OR HOPELESS: 0

## 2018-01-01 ASSESSMENT — PAIN DESCRIPTION - LOCATION
LOCATION: BACK;HIP
LOCATION: KNEE
LOCATION: LEG
LOCATION: KNEE
LOCATION: LEG
LOCATION: ARM
LOCATION: LEG
LOCATION: ABDOMEN;LEG
LOCATION: LEG
LOCATION: KNEE
LOCATION: HIP

## 2018-01-01 ASSESSMENT — PAIN DESCRIPTION - PROGRESSION
CLINICAL_PROGRESSION: NOT CHANGED
CLINICAL_PROGRESSION: NOT CHANGED

## 2018-01-01 ASSESSMENT — PAIN DESCRIPTION - FREQUENCY
FREQUENCY: INTERMITTENT
FREQUENCY: CONTINUOUS

## 2018-01-01 ASSESSMENT — PAIN DESCRIPTION - ONSET: ONSET: ON-GOING

## 2018-01-01 ASSESSMENT — PAIN SCALES - WONG BAKER: WONGBAKER_NUMERICALRESPONSE: 4

## 2018-01-01 ASSESSMENT — PAIN DESCRIPTION - DIRECTION: RADIATING_TOWARDS: THIGH

## 2018-04-30 PROBLEM — M25.562 ACUTE PAIN OF LEFT KNEE: Status: ACTIVE | Noted: 2018-01-01

## 2018-04-30 PROBLEM — I10 ESSENTIAL HYPERTENSION: Status: ACTIVE | Noted: 2018-01-01

## 2018-04-30 PROBLEM — R42 DIZZINESS: Status: ACTIVE | Noted: 2018-01-01

## 2018-04-30 PROBLEM — I95.1 ORTHOSTATIC HYPOTENSION: Status: ACTIVE | Noted: 2018-01-01

## 2018-04-30 PROBLEM — I35.1 NONRHEUMATIC AORTIC VALVE INSUFFICIENCY: Status: ACTIVE | Noted: 2018-01-01

## 2018-07-15 PROBLEM — C50.919 METASTATIC BREAST CANCER: Status: ACTIVE | Noted: 2018-01-01

## 2018-07-15 PROBLEM — M84.58XA PATHOLOGICAL FRACTURE OF VERTEBRA DUE TO NEOPLASTIC DISEASE: Chronic | Status: ACTIVE | Noted: 2018-01-01

## 2018-07-15 PROBLEM — S22.019A CLOSED FRACTURE OF FIRST THORACIC VERTEBRA (HCC): Chronic | Status: ACTIVE | Noted: 2018-01-01

## 2018-07-15 NOTE — CONSULTS
PROGRESS NOTE    PCP: Tony Candelaria MD  Referring Provider: No ref. provider found    IMPRESSION:     1. Adenocarcinoma of the breast locally extensive and with widespread bony metastases  2. Generalized pain  3. Extreme weakness of legs accompanied by numbness of legs, would suggest possible cord compression  4. Hypertension    INTERVAL HISTORY:     Patient is known of lesion on the right breast there is extensively spread locally across her chest into her neck down into her abdominal wall. Also his evidence of multiple bone metastases. The patient has essentially become bedridden is not able to get up because of weakness and pain. She is able to move her legs a little barely [place]. It is not clear if this is because of pain or because of weakness. Her past medical history is fairly negative, she denies heart disease strokes diabetes liver problems or kidney problems. She does have hypertension. 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 She denies any prior surgeries. She has not had a pregnancy she has not been on hormone replacement therapy. There is a negative family history of breast cancer. She is not aware of any medication allergies. Her mother had cervical cancer father had prostate cancer maternal grandmother had pancreatic cancer and paternal grandfather had colon cancer. She is a nonsmoker has never smoked regularly, she does not use alcohol. PLAN:     1. Need to rule out possibility of cord compression  2. We'll get MRIs of the thoracic and lumbar spine  3. Consults orthopedic spine surgery  4. Consult radiation oncology  5. Start Decadron  6. Need biopsy to ascertain ER/AR and HER-2 status to help guide therapy    No Follow-up on file. VISIT DIAGNOSIS:  There were no encounter diagnoses.     PAST MEDICAL HISTORY:      Diagnosis Date    Breast cancer

## 2018-07-15 NOTE — PROGRESS NOTES
and extending up the neck. Left without any mass or skin changes.   LUNGS:  CTA bilaterally, no ronchi, rales, or wheezes, no intercostal muscle retractions or accessory muscle use  CARDIOVASCULAR:  regular rate and rhythm and No Murmur, rub,  Or gallops  ABDOMEN: soft,  No surgical scars, present,  non distended,  No hepato-organomegaly non-tender, no guarding present, no masses and no hernias  MUSCULOSKELETAL:  Tender left knee with diminished range of motion, there is  obvious somatic dysfunction, gait absent  NEUROLOGIC:  Mental Status Exam:  Level of Alertness:   alert  Orientation:   oriented to person, place, and time  pshych judgement and insight is normal  Rectal: deferred  Skin: no rashes, lesions, or ulcers    CBC:   Lab Results   Component Value Date    WBC 10.8 07/15/2018    RBC 3.68 07/15/2018    HGB 11.3 07/15/2018    HCT 32.9 07/15/2018    MCV 89.3 07/15/2018    MCH 30.6 07/15/2018    MCHC 34.3 07/15/2018    RDW 14.9 07/15/2018     07/15/2018    MPV 7.1 07/15/2018     CMP:    Lab Results   Component Value Date     07/15/2018    K 3.1 07/15/2018    CL 94 07/15/2018    CO2 26 07/15/2018    BUN 7 07/15/2018    CREATININE 0.46 07/15/2018    GFRAA >60 07/15/2018    LABGLOM >60 07/15/2018    GLUCOSE 103 07/15/2018    PROT 6.0 07/15/2018    LABALBU 3.3 07/15/2018    CALCIUM 9.8 07/15/2018    BILITOT 0.75 07/15/2018    ALKPHOS 145 07/15/2018    AST 86 07/15/2018    ALT 10 07/15/2018       Pertinent Radiology:   EXAMINATION:   3 VIEWS OF THE LEFT KNEE       4/19/2018 11:11 am       COMPARISON:   None.       HISTORY:   ORDERING SYSTEM PROVIDED HISTORY: pain   TECHNOLOGIST PROVIDED HISTORY:   Reason for exam:->pain   Ordering Physician Provided Reason for Exam: pain   Acuity: Unknown   Type of Exam: Unknown       FINDINGS:   No acute fracture for dislocation is noted.  Areas of geographic   demineralization are noted in the posteromedial femoral condyles with   cortical scalloping.         retropharyngeal abscess. There is mild extension of above process into the lateral retropharyngeal   space inferiorly.       SALIVARY GLANDS/THYROID:       There may be involvement of the right parotid gland with infiltrating mass   coming in close proximity to the right submandibular _____  gland.  The left   submandibular gland and left parotid gland are unremarkable.  The thyroid   gland appears grossly unremarkable with small nonspecific left thyroid   lesions.       LYMPH NODES:       There are mildly prominent nonspecific left-sided cervical lymph nodes. Limited evaluation of right cervical lymph node due to extensive infiltrating   mass.       SOFT TISSUES:       There is an infiltrating mass within the right lateral and right posterior   _____  extending to the midline.  This mass extends superiorly to the level   of the right parotid and probably involving the right parotid gland.  This   mass comes in close proximity to the right ear.  This mass extends inferiorly   into the right chest wall and in the right breast.  There appears to be   destruction of the right-sided ribs.  No evidence of discrete soft tissue   abscess. Minesh Woodward is extensive edema involving and adjacent to the mass.       There is narrowing of the right internal jugular vein without evidence of   occlusion.  This mass surrounds the right common, right subclavian artery,   and proximal aspect of the right internal carotid artery.       BRAIN/ORBITS/SINUSES:       There is mild-to-moderate dilatation of the lateral ventricle, partially   imaged.  No enhancing intracranial mass is identified.  This is partially   evaluated.  The paranasal sinuses and mastoid air cells are clear.       LUNG APICES/SUPERIOR MEDIASTINUM:       Infiltrating mass extends into the right anterior chest wall and right   breast.  There is destruction of the anterior right rib.  Small bilateral   pleural effusions.  Multiple enlarged mediastinal lymph nodes.  Please see   separate CT chest report.       BONES:       There are lytic lesions within C7 and T1 vertebral body.  There appears to be   pathologic fracture of T1 vertebral body.  There may be epidural component at   T1 vertebral level.  There is also sternal metastasis.         Impression   Large infiltrating neoplasm within right aspect of the neck, extending   superiorly to the level of the right parotid and inferiorly into the right   anterior chest wall and right breast.  Inferior extent is not imaged on this   examination.  Please see CT chest report.       Above described mass surrounds and narrows multiple right-sided neck vessels   without definite evidence of occlusion.       There are osseous metastases at C7, T1, and within the sternum.  There may be   pathologic fracture of T1 vertebral body with mild vertebral body height   loss.  There may be epidural component at T1 vertebral level.  Follow-up MRI   may be obtained as clinically warranted.       Mediastinal lymphadenopathy would be better evaluated on a CT chest   examination.       Mild-to-moderate dilatation of the lateral ventricle is partially imaged. Please see separate CT head report.  If there is a concern for intracranial   metastasis, follow-up MRI brain with and without contrast may be obtained.         EXAMINATION:   CT OF THE ABDOMEN AND PELVIS WITH CONTRAST; CTA OF THE CHEST 7/15/2018 11:44   am; 7/15/2018 11:49 am       TECHNIQUE:   CT of the abdomen and pelvis was performed with the administration of   intravenous contrast. Multiplanar reformatted images are provided for review. Dose modulation, iterative reconstruction, and/or weight based adjustment of   the mA/kV was utilized to reduce the radiation dose to as low as reasonably   achievable.; CTA of the chest was performed after the administration of   intravenous contrast.  Multiplanar reformatted images are provided for   review.  MIP images are provided for review.  Dose modulation, iterative   reconstruction, and/or weight based adjustment of the mA/kV was utilized to   reduce the radiation dose to as low as reasonably achievable.       COMPARISON:   None       HISTORY:   ORDERING SYSTEM PROVIDED HISTORY: breast mass, evaluate metastatic lesions   TECHNOLOGIST PROVIDED HISTORY:   IV Only Contrast   Ordering Physician Provided Reason for Exam: BREAST MASS, EVALUATE METASTIC   LESION   Acuity: Unknown   Type of Exam: Unknown; ORDERING SYSTEM PROVIDED HISTORY: evaluate for mass   TECHNOLOGIST PROVIDED HISTORY:   Ordering Physician Provided Reason for Exam: EVALUATE FOR MASS   Acuity: Unknown   Type of Exam: Unknown       FINDINGS:   Chest:       Mediastinum: Thoracic aorta appears normal in caliber.  Pulmonary trunk and   its branches demonstrate no evidence of pulmonary embolism.  Heart appears   normal in size without pericardial effusion.  Multiple prominent to enlarged   mediastinal lymph nodes are identified, some which are partially calcified   largest measuring 1.2 cm short axis within the right peritracheal region.  No   definite hilar lymphadenopathy.  The esophagus is grossly unremarkable.       Lungs/pleura: Moderate bilateral pleural effusions are noted with compressive   atelectasis involving the lower lobes.  Trachea and distal airways appear   patent.  No suspicious noncalcified lung nodule or mass.  Scattered areas   scarring are noted.       Soft Tissues/Bones: A large ill-defined breast mass is seen involving the   left breast, seen the majority of the left breast with partially calcified   enlarged left axillary lymphadenopathy. Esta Fern is associated skin thickening   noted.  There appears to be extension of this mass into the chest wall with   loss of the pectoralis contours.  There also appears to be prominent to   enlarged left axillary lymph nodes largest measuring 1.4 cm in short axis.    There is associated rib destruction involving the anterior aspect of the

## 2018-07-15 NOTE — ED PROVIDER NOTES
[facility]  eMERGENCY dEPARTMENT eNCOUnter      Pt Name: Velora Riedel  MRN: [id number]  [place] 1945  Date of evaluation: 7/15/18      CHIEF COMPLAINT       Chief Complaint   Patient presents with    Knee Pain     left side     HISTORY OF PRESENT ILLNESS   HPI 81 y.o. female presents with complaints of generalized weakness and soreness. She says that she is having difficulty getting out of bed. She was seen in the emergency department approximately 3 months ago, at that time she had an x-ray of her knee that showed evidence of metastatic cancer to the bones. The patient admits that for the last 2 years she's had a right breast mass that she has not sought medical attention for. She said that she knew that she likely had breast cancer, but she could not bring herself to go through all of the treatments, as she had to care for both her mother who had multiple myeloma, and her father who had end-stage renal disease. She saw their suffering with medical treatments and she did not want to go through the same. She says that now she has too much pain with any movement and she is too weak to move and so she could not care for herself any further home. Pain is rated moderate to severe, constant, but worsened with any activity. She's not taken any pain medications. No previous evaluations other than the emergency department visit and the single visit to a PCP shortly thereafter, during both times she declined any further evaluation or treatment. REVIEW OF SYSTEMS     Review of Systems   Constitutional: Positive for activity change, appetite change and fatigue. Negative for chills and fever. HENT: Negative for congestion. Eyes: Negative for visual disturbance. Respiratory: Negative for cough and shortness of breath. Cardiovascular: Negative for chest pain. Gastrointestinal: Negative for abdominal pain. Musculoskeletal: Positive for arthralgias, back pain, gait problem and joint swelling. [facility].  Juliet Cid get CT scan of her head and neck chest abdomen and pelvis. Patient declining any pain medication at this time. Planning admission to internal medicine with oncology and general surgery consultations. Hospital course:  CT scan results reviewed. Discussed with general surgery. Consult to Dr. Suleiman Bingham of orthopedic surgery. Admitting to internal medicine. Pt agreeable to plan. DIAGNOSTIC RESULTS     RADIOLOGY:All plain film, CT, MRI, and formal ultrasound images (except ED bedside ultrasound) are read by the radiologist and the images and interpretations are directly viewed by the emergency physician. CT Head WO Contrast   Final Result   1. Lytic lesion in the right frontal calvarium most likely representing   osseous metastatic disease. 2. No obvious mass or midline shift identified on limited noncontrast CT. Assuming there are no contraindications to MRI or contrast, the most   sensitive exam would be an MR brain with contrast to evaluate for   intracranial metastasis. 3. No clear evidence for acute intracranial hemorrhage or midline shift. 4. Mild to moderate chronic small vessel ischemic white matter disease. Atherosclerotic calcification of the vasculature. Mild atrophy. 5. Edema, soft tissue thickening, induration of the subcutaneous fat, skin,   and musculature of the right suboccipital neck/soft tissues, the cause of   which is unclear. This could be related to malignancy, recent intervention,   or infection depending upon the clinical setting. CT ABDOMEN PELVIS W IV CONTRAST   Final Result   *Large ill-defined right breast mass with associated skin thickening   extension into the chest wall. There is associated metastatic   lymphadenopathy involving the axillary regions as well as the mediastinum.    In addition, there appears to be soft tissue infiltration involving the right   chest extending into the thoracic inlet and neck also suggestive of metastatic disease with the right subclavian vein not clearly visualized. *Diffuse bony metastatic disease is seen involving both the axial and   appendicular skeleton. No pathological fractures are noted. *Moderate bilateral pleural effusions possibly metastatic. *Pedunculated partially calcified fibroid uterus. Given the above findings,   dedicated pelvic ultrasound is recommended to confirm findings. *No definitive CT evidence of intra-abdominal metastatic disease. CT Chest Pulmonary Embolism W Contrast   Final Result   *Large ill-defined right breast mass with associated skin thickening   extension into the chest wall. There is associated metastatic   lymphadenopathy involving the axillary regions as well as the mediastinum. In addition, there appears to be soft tissue infiltration involving the right   chest extending into the thoracic inlet and neck also suggestive of   metastatic disease with the right subclavian vein not clearly visualized. *Diffuse bony metastatic disease is seen involving both the axial and   appendicular skeleton. No pathological fractures are noted. *Moderate bilateral pleural effusions possibly metastatic. *Pedunculated partially calcified fibroid uterus. Given the above findings,   dedicated pelvic ultrasound is recommended to confirm findings. *No definitive CT evidence of intra-abdominal metastatic disease. CT SOFT TISSUE NECK W CONTRAST   Preliminary Result   Large infiltrating neoplasm within right aspect of the neck, extending   superiorly to the level of the right parotid and inferiorly into the right   anterior chest wall and right breast.  Inferior extent is not imaged on this   examination. Please see CT chest report. Above described mass surrounds and narrows multiple right-sided neck vessels   without definite evidence of occlusion. There are osseous metastases at C7, T1, and within the sternum.   There may be   pathologic (36.6 °C) 98.2 °F (36.8 °C)   TempSrc:   Oral Oral   SpO2: 97% 97% 97% 99%   Weight:       Height:           The patient was given the following medications while in the emergency department:  Orders Placed This Encounter   Medications    iopamidol (ISOVUE-370) 76 % injection 150 mL    DISCONTD: sodium chloride flush 0.9 % injection 10 mL    0.9 % sodium chloride bolus    sodium chloride flush 0.9 % injection 10 mL    sodium chloride flush 0.9 % injection 10 mL    magnesium hydroxide (MILK OF MAGNESIA) 400 MG/5ML suspension 30 mL    ondansetron (ZOFRAN) injection 4 mg    enoxaparin (LOVENOX) injection 40 mg    0.9 % sodium chloride infusion    HYDROcodone-acetaminophen (NORCO) 5-325 MG per tablet 1 tablet    HYDROcodone-acetaminophen (NORCO) 5-325 MG per tablet 2 tablet    LORazepam (ATIVAN) tablet 0.5 mg    fentaNYL (SUBLIMAZE) injection 25 mcg    fentaNYL (SUBLIMAZE) injection 50 mcg    fentaNYL (SUBLIMAZE) injection 100 mcg    amLODIPine (NORVASC) tablet 10 mg    dexamethasone (DECADRON) tablet 4 mg     -------------------------  CRITICAL CARE:   CONSULTS: IP CONSULT TO ORTHOPEDIC SURGERY  IP CONSULT TO ORTHOPEDIC SURGERY  IP CONSULT TO RADIATION ONCOLOGY  IP CONSULT TO ONCOLOGY  IP CONSULT TO GENERAL SURGERY  IP CONSULT TO PALLIATIVE CARE  IP CONSULT TO ORTHOPEDIC SURGERY  IP CONSULT TO RADIATION ONCOLOGY  PROCEDURES: Procedures     FINAL IMPRESSION      1. Metastatic breast cancer ([address]    2. Malignant pleural effusion    3. SVC syndrome    4.  Hypokalemia          DISPOSITION/PLAN   DISPOSITION Admitted 07/15/2018 12:04:47 PM      PATIENT REFERRED TO:  Gualberto Santana, [address]  [phone]            DISCHARGE MEDICATIONS:  Current Discharge Medication List            Noemi Bai MD  Attending Emergency Physician                      Noemi Bai MD  07/15/18 [id number]

## 2018-07-15 NOTE — H&P
[facility]      [facility]     HISTORY AND PHYSICAL EXAMINATION            Date:   7/15/2018  Patient name:  Omer Nixon  Date of admission:  7/15/2018  8:59 AM  MRN:   [id number]  Account:  [de-identified]  YOB: 1945  PCP:    Margot Chiu MD  Room:   2065/2065-  Code Status:    No Order    Chief Complaint:     Chief Complaint   Patient presents with    Knee Pain     left side       History Obtained From:     patient    History of Present Illness: The patient is a 81 y.o. Non-/non  female who presents with Knee Pain (left side)   and she is admitted to the hospital for the management of  Metastatic breast cancer. Patient states that she noticed a fungating lesion on the right chest wall that began a couple of years ago. The lesion has been producing purulent and foul-smelling pus. Today she is presenting with weakness and having difficulty standing from a sitting position. Patient denies having taken OCPs or hormone replacement therapy in the past there is a distant family history of multiple etiologies of cancer. The patient states she's never had a mammogram done before and has not been following up with physicians for many years. She is having difficulty using the bathroom because of the inability to stand when she finished. She complains of numbness and tingling and weakness in the lower extremities bilaterally. The pain in her legs starts from her feet and radiates upwards bilaterally. Review of systems shows she has associated nausea when eating food however denies any emesis or change in bowel habits. She has had a cough recently with no sputum production. She has had no hematuria or dysuria. The patient denies any double vision photophobia headaches or change in vision recently.   She has had no changes to her Alkaline Phosphatase 145 (H) 35 - 104 U/L    ALT 10 5 - 33 U/L    AST 86 (H) <32 U/L    Total Bilirubin 0.75 0.3 - 1.2 mg/dL    Total Protein 6.0 (L) 6.4 - 8.3 g/dL    Alb 3.3 (L) 3.5 - 5.2 g/dL    Albumin/Globulin Ratio NOT REPORTED 1.0 - 2.5    GFR Non-African American >60 >60 mL/min    GFR African American >60 >60 mL/min    GFR Comment          GFR Staging NOT REPORTED    Protime-INR    Collection Time: 07/15/18 10:05 AM   Result Value Ref Range    Protime 12.5 (H) 9.7 - 12.0 sec    INR 1.2    Magnesium    Collection Time: 07/15/18 10:05 AM   Result Value Ref Range    Magnesium 1.7 1.6 - 2.6 mg/dL   Phosphorus    Collection Time: 07/15/18 10:05 AM   Result Value Ref Range    Phosphorus 3.6 2.6 - 4.5 mg/dL       Imaging/Diagnostics:  Ct Head Wo Contrast    Result Date: 7/15/2018  EXAMINATION: CT OF THE HEAD WITHOUT CONTRAST  7/15/2018 11:22 am TECHNIQUE: CT of the head was performed without the administration of intravenous contrast. Dose modulation, iterative reconstruction, and/or weight based adjustment of the mA/kV was utilized to reduce the radiation dose to as low as reasonably achievable. COMPARISON: None HISTORY: ORDERING SYSTEM PROVIDED HISTORY: evaluate for brain metastasis TECHNOLOGIST PROVIDED HISTORY: Ordering Physician Provided Reason for Exam: EVALUATE FOR BRAIN METS Acuity: Unknown Type of Exam: Unknown 70-year-old female; evaluate for brain metastasis FINDINGS: BRAIN/VENTRICLES: No abnormal extra-axial fluid collections identified. Mild diffuse prominence of the sulci, cisterns, and extra-axial spaces. Ventricles are mildly enlarged and midline in position. Atherosclerotic calcification of the distal vertebral and bilateral parasellar carotid arteries. No abnormal extra-axial fluid collections identified. No clear evidence for acute intracranial hemorrhage, mass, mass effect, or midline shift identified by CT. Foramen magnum and fourth ventricle appear patent.  Mild to moderate partially evaluated. The paranasal sinuses and mastoid air cells are clear. LUNG APICES/SUPERIOR MEDIASTINUM: Infiltrating mass extends into the right anterior chest wall and right breast.  There is destruction of the anterior right rib. Small bilateral pleural effusions. Multiple enlarged mediastinal lymph nodes. Please see separate CT chest report. BONES: There are lytic lesions within C7 and T1 vertebral body. There appears to be pathologic fracture of T1 vertebral body. There may be epidural component at T1 vertebral level. There is also sternal metastasis. Large infiltrating neoplasm within right aspect of the neck, extending superiorly to the level of the right parotid and inferiorly into the right anterior chest wall and right breast.  Inferior extent is not imaged on this examination. Please see CT chest report. Above described mass surrounds and narrows multiple right-sided neck vessels without definite evidence of occlusion. There are osseous metastases at C7, T1, and within the sternum. There may be pathologic fracture of T1 vertebral body with mild vertebral body height loss. There may be epidural component at T1 vertebral level. Follow-up MRI may be obtained as clinically warranted. Mediastinal lymphadenopathy would be better evaluated on a CT chest examination. Mild-to-moderate dilatation of the lateral ventricle is partially imaged. Please see separate CT head report. If there is a concern for intracranial metastasis, follow-up MRI brain with and without contrast may be obtained. Ct Abdomen Pelvis W Iv Contrast    Result Date: 7/15/2018  EXAMINATION: CT OF THE ABDOMEN AND PELVIS WITH CONTRAST; CTA OF THE CHEST 7/15/2018 11:44 am; 7/15/2018 11:49 am TECHNIQUE: CT of the abdomen and pelvis was performed with the administration of intravenous contrast. Multiplanar reformatted images are provided for review.  Dose modulation, iterative reconstruction, and/or weight based adjustment of the mA/kV was utilized to reduce the radiation dose to as low as reasonably achievable.; CTA of the chest was performed after the administration of intravenous contrast.  Multiplanar reformatted images are provided for review. MIP images are provided for review. Dose modulation, iterative reconstruction, and/or weight based adjustment of the mA/kV was utilized to reduce the radiation dose to as low as reasonably achievable. COMPARISON: None HISTORY: ORDERING SYSTEM PROVIDED HISTORY: breast mass, evaluate metastatic lesions TECHNOLOGIST PROVIDED HISTORY: IV Only Contrast Ordering Physician Provided Reason for Exam: BREAST MASS, EVALUATE METASTIC LESION Acuity: Unknown Type of Exam: Unknown; ORDERING SYSTEM PROVIDED HISTORY: evaluate for mass TECHNOLOGIST PROVIDED HISTORY: Ordering Physician Provided Reason for Exam: EVALUATE FOR MASS Acuity: Unknown Type of Exam: Unknown FINDINGS: Chest: Mediastinum: Thoracic aorta appears normal in caliber. Pulmonary trunk and its branches demonstrate no evidence of pulmonary embolism. Heart appears normal in size without pericardial effusion. Multiple prominent to enlarged mediastinal lymph nodes are identified, some which are partially calcified largest measuring 1.2 cm short axis within the right peritracheal region. No definite hilar lymphadenopathy. The esophagus is grossly unremarkable. Lungs/pleura: Moderate bilateral pleural effusions are noted with compressive atelectasis involving the lower lobes. Trachea and distal airways appear patent. No suspicious noncalcified lung nodule or mass. Scattered areas scarring are noted. Soft Tissues/Bones: A large ill-defined breast mass is seen involving the left breast, seen the majority of the left breast with partially calcified enlarged left axillary lymphadenopathy. There is associated skin thickening noted. There appears to be extension of this mass into the chest wall with loss of the pectoralis contours.   There also appears to be prominent to enlarged left axillary lymph nodes largest measuring 1.4 cm in short axis. There is associated rib destruction involving the anterior aspect of the right 4th rib. Additional expansile lytic lesions are identified involving the right 5th, 8th and 10th ribs as well as the left 4th through 6th ribs. Additional lytic lesions are identified involving the T1, T12 and L1 vertebral bodies as well as the body of the sternum. Additional lytic lesions are also identified involving the visualized portions of the proximal humeri. In addition to the findings above, there appears to be soft tissue infiltration involving the right aspect of the chest extending into the thoracic inlet and along the anterior aspect of the inferior aspect of the neck which appears to extend into the superior mediastinum. The right subclavian vein is not clearly identified. Abdomen/Pelvis: Organs: No enhancing liver lesion is noted. Portal vein, gallbladder, spleen, pancreas and adrenal glands all appear unremarkable. Kidneys appear unremarkable. Abdominal aorta appears normal in caliber. GI/Bowel: Stomach is grossly unremarkable. Small bowel appears nondilated. No acute colonic abnormality is seen. Appendix not clearly identified. Pelvis: Fibroid uterus is seen with a pedunculated fibroid which is partially calcified. No adnexal mass or cyst.  Urinary bladder is grossly unremarkable. Peritoneum/Retroperitoneum: No free air or free fluid. No lymphadenopathy. Bones/Soft Tissues: Abdominal wall demonstrates body wall anasarca. Lytic lesions are identified throughout the lumbar spine and bony pelvis and hips with associated cortical destruction largest seen along the right iliac wing measuring 4.0 x 3.4 cm. No pathological fractures are noted. *Large ill-defined right breast mass with associated skin thickening extension into the chest wall.   There is associated metastatic lymphadenopathy involving the axillary regions as well as the mediastinum. In addition, there appears to be soft tissue infiltration involving the right chest extending into the thoracic inlet and neck also suggestive of metastatic disease with the right subclavian vein not clearly visualized. 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 No pathological fractures are noted. *Moderate bilateral pleural effusions possibly metastatic. *Pedunculated partially calcified fibroid uterus. Given the above findings, dedicated pelvic ultrasound is recommended to confirm findings. *No definitive CT evidence of intra-abdominal metastatic disease. Ct Chest Pulmonary Embolism W Contrast    Result Date: 7/15/2018  EXAMINATION: CT OF THE ABDOMEN AND PELVIS WITH CONTRAST; CTA OF THE CHEST 7/15/2018 11:44 am; 7/15/2018 11:49 am TECHNIQUE: CT of the abdomen and pelvis was performed with the administration of intravenous contrast. Multiplanar reformatted images are provided for review. Dose modulation, iterative reconstruction, and/or weight based adjustment of the mA/kV was utilized to reduce the radiation dose to as low as reasonably achievable.; CTA of the chest was performed after the administration of intravenous contrast.  Multiplanar reformatted images are provided for review. MIP images are provided for review. Dose modulation, iterative reconstruction, and/or weight based adjustment of the mA/kV was utilized to reduce the radiation dose to as low as reasonably achievable.  COMPARISON: None HISTORY: ORDERING SYSTEM PROVIDED HISTORY: breast mass, evaluate metastatic lesions TECHNOLOGIST PROVIDED HISTORY: IV Only Contrast Ordering Physician Provided Reason for Exam: BREAST MASS, EVALUATE METASTIC LESION Acuity: Unknown Type of Exam: Unknown; ORDERING SYSTEM PROVIDED HISTORY: evaluate for mass TECHNOLOGIST PROVIDED HISTORY: Ordering Physician Provided Reason for Exam: EVALUATE FOR MASS Acuity: Unknown Type of Exam: identified. Abdomen/Pelvis: Organs: No enhancing liver lesion is noted. Portal vein, gallbladder, spleen, pancreas and adrenal glands all appear unremarkable. Kidneys appear unremarkable. Abdominal aorta appears normal in caliber. GI/Bowel: Stomach is grossly unremarkable. Small bowel appears nondilated. No acute colonic abnormality is seen. Appendix not clearly identified. Pelvis: Fibroid uterus is seen with a pedunculated fibroid which is partially calcified. No adnexal mass or cyst.  Urinary bladder is grossly unremarkable. Peritoneum/Retroperitoneum: No free air or free fluid. No lymphadenopathy. Bones/Soft Tissues: Abdominal wall demonstrates body wall anasarca. Lytic lesions are identified throughout the lumbar spine and bony pelvis and hips with associated cortical destruction largest seen along the right iliac wing measuring 4.0 x 3.4 cm. No pathological fractures are noted. *Large ill-defined right breast mass with associated skin thickening extension into the chest wall. There is associated metastatic lymphadenopathy involving the axillary regions as well as the mediastinum. In addition, there appears to be soft tissue infiltration involving the right chest extending into the thoracic inlet and neck also suggestive of metastatic disease with the right subclavian vein not clearly visualized. 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 No pathological fractures are noted. *Moderate bilateral pleural effusions possibly metastatic. *Pedunculated partially calcified fibroid uterus. Given the above findings, dedicated pelvic ultrasound is recommended to confirm findings. *No definitive CT evidence of intra-abdominal metastatic disease.        Assessment :      Primary Problem  <principal problem not specified>    Active Hospital Problems    Diagnosis Date Noted    Metastatic breast cancer ([facility] [[email or web address] 75.) [C50.919] 07/15/2018    Closed fracture of first

## 2018-07-15 NOTE — PLAN OF CARE
Problem: Falls - Risk of:  Goal: Will remain free from falls  Will remain free from falls   Outcome: Ongoing  Patient has remained free of falls this shift. Patient is bed ridden and calls out when needing to move. Problem: Pain:  Goal: Pain level will decrease  Pain level will decrease   Outcome: Ongoing  Patient has been medicated for pain as prescribed. Patient satisfied.

## 2018-07-16 PROBLEM — M84.48XA PATHOLOGICAL FRACTURE OF SACRAL VERTEBRA: Status: ACTIVE | Noted: 2018-01-01

## 2018-07-16 PROBLEM — M84.48XD PATHOLOGICAL FRACTURE OF THORACIC VERTEBRA WITH ROUTINE HEALING: Status: ACTIVE | Noted: 2018-01-01

## 2018-07-16 PROBLEM — S32.050A CLOSED WEDGE COMPRESSION FRACTURE OF FIFTH LUMBAR VERTEBRA (HCC): Status: ACTIVE | Noted: 2018-01-01

## 2018-07-16 PROBLEM — M84.58XD PATHOLOGICAL FRACTURE OF VERTEBRA DUE TO NEOPLASTIC DISEASE WITH ROUTINE HEALING: Status: ACTIVE | Noted: 2018-01-01

## 2018-07-16 NOTE — CONSULTS
Consults  Patient: Eulalia Nipple  Unit/Bed: [facility] OR Pool/NONE  YOB: 1945  MRN: [id number]  Acct: [de-identified]   Ad[facility]: [facility]  [facility]  Hospital Day: 1    Subjective:    Patient is having problems with   Leg Pain    The incident occurred more than 1 week ago. There was no injury mechanism. The pain is present in the left leg, left hip, right hip, left thigh and right thigh. The quality of the pain is described as aching. The pain is mild. The pain has been worsening since onset. She reports no foreign bodies present. The symptoms are aggravated by movement. She has tried nothing for the symptoms. The treatment provided no relief. Patient presents with progressive left leg and thigh pain    Known breast lesion for >3 years        Patient Seen, Chart, Labs, Radiology studies, and Consults reviewed. Objective:   /67   Pulse 100   Temp 98.5 °F (36.9 °C)   Resp 16   Ht 5' 6\" (1.676 m)   Wt 137 lb (62.1 kg)   SpO2 98%   BMI 22.11 kg/m²   No intake or output data in the 24 hours ending 07/16/18 1031  Review of Systems   Constitutional: Positive for fatigue and unexpected weight change. Physical Exam   Constitutional: She is oriented to person, place, and time. She appears well-developed and well-nourished. HENT:   Head: Normocephalic and atraumatic. Eyes: Conjunctivae and EOM are normal.   Neck: Normal range of motion. Pulmonary/Chest: Effort normal. No respiratory distress. Neurological: She is alert and oriented to person, place, and time. She has normal strength. No sensory deficit. Normal gait   Skin: Skin is warm and dry. Psychiatric: Her behavior is normal. Thought content normal.   Nursing note and vitals reviewed.         Drains:No  Imaging:Yes multiple x-rays; ct and mri reviewed      Medications:    ceFAZolin        [MAR Hold] sodium chloride flush  10 mL Intravenous 2 times per day    [MAR Hold]

## 2018-07-16 NOTE — PLAN OF CARE
Problem: Falls - Risk of:  Goal: Will remain free from falls  Will remain free from falls   Outcome: Ongoing  Patient was free from falls this shift. Call light and bed side table are within reach, bed is in lowest position, and side rails are up X2. Will continue to monitor. Problem: Risk for Impaired Skin Integrity  Goal: Tissue integrity - skin and mucous membranes  Structural intactness and normal physiological function of skin and  mucous membranes. Outcome: Ongoing  Skin remained intact with normal physiological function. Problem: Pain:  Goal: Pain level will decrease  Pain level will decrease   Outcome: Ongoing  Pain was assessed during hourly rounding. Pain medication was given per order. See MAR for details.

## 2018-07-16 NOTE — PROGRESS NOTES
Intravenous Once PRN Diana Cavazos MD        hydrALAZINE (APRESOLINE) injection 5 mg  5 mg Intravenous Q10 Min PRN Diana Cavazos MD        meperidine (DEMEROL) injection 12.5 mg  12.5 mg Intravenous Q5 Min PRN Diana Cavazos MD        lactated ringers infusion   Intravenous Continuous Marlene Curiel  mL/hr at 07/16/18 0923      ceFAZolin (ANCEF) 1 g injection             lidocaine-EPINEPHrine 1 percent-[id number] injection    PRN Agus Prado MD   20 mL at 07/16/18 1111    [MAR Hold] sodium chloride flush 0.9 % injection 10 mL  10 mL Intravenous 2 times per day Amber Hernandez MD   10 mL at 07/15/18 2110    [MAR Hold] sodium chloride flush 0.9 % injection 10 mL  10 mL Intravenous PRN MD Jacky Ulloa [facility] Hold] magnesium hydroxide (MILK OF MAGNESIA) 400 MG/5ML suspension 30 mL  30 mL Oral Daily PRN Amber Hernandez MD        [facility] Hold] ondansetron (ZOFRAN) injection 4 mg  4 mg Intravenous Q6H PRN Amber Hernandez MD        [facility] Hold] enoxaparin (LOVENOX) injection 40 mg  40 mg Subcutaneous Daily Caitie Mariscal MD   40 mg at 07/15/18 1358    [MAR Hold] 0.9 % sodium chloride infusion   Intravenous Continuous Amber Hernandez MD 75 mL/hr at 07/16/18 0517      [MAR Hold] HYDROcodone-acetaminophen (NORCO) 5-325 MG per tablet 1 tablet  1 tablet Oral Q6H PRN Agus Prado MD        [facility] Hold] HYDROcodone-acetaminophen (NORCO) 5-325 MG per tablet 2 tablet  2 tablet Oral Q4H PRN Agus Prado MD        [facility] Hold] LORazepam (ATIVAN) tablet 0.5 mg  0.5 mg Oral Q4H PRN Agus Prado MD   0.5 mg at 07/15/18 1637    [MAR Hold] fentaNYL (SUBLIMAZE) injection 25 mcg  25 mcg Intravenous Q1H PRN Agus Prado MD   25 mcg at 07/16/18 0516    [MAR Hold] fentaNYL (SUBLIMAZE) injection 50 mcg  50 mcg Intravenous Q1H PRN Agus Prado MD   50 mcg at 07/15/18 9154    [MAR Hold] fentaNYL (SUBLIMAZE) injection 100 mcg  100 mcg Intravenous Q1H PRN Agus Prado MD        [facility] Hold] amLODIPine (NORVASC) tablet 10 mg  10 mg Oral 16.0 g/dL    Hematocrit 32.9 (L) 36 - 46 %    MCV 89.3 80 - 100 fL    MCH 30.6 26 - 34 pg    MCHC 34.3 31 - 37 g/dL    RDW 14.9 11.5 - 14.9 %    Platelets 866 955 - 811 k/uL    MPV 7.1 6.0 - 12.0 fL    NRBC Automated NOT REPORTED per 100 WBC    Differential Type NOT REPORTED     Immature Granulocytes NOT REPORTED 0 %    Absolute Immature Granulocyte NOT REPORTED 0.00 - 0.30 k/uL    WBC Morphology NOT REPORTED     RBC Morphology NOT REPORTED     Platelet Estimate NOT REPORTED     Seg Neutrophils 66 36 - 66 %    Lymphocytes 28 24 - 44 %    Monocytes 6 1 - 7 %    Eosinophils % 0 0 - 4 %    Basophils 0 0 - 2 %    Segs Absolute 7.13 1.3 - 9.1 k/uL    Absolute Lymph # 3.02 1.0 - 4.8 k/uL    Absolute Mono # 0.65 0.1 - 1.3 k/uL    Absolute Eos # 0.00 0.0 - 0.4 k/uL    Basophils # 0.00 0.0 - 0.2 k/uL    Morphology Normal    Comprehensive Metabolic Panel   Result Value Ref Range    Glucose 103 (H) 70 - 99 mg/dL    BUN 7 (L) 8 - 23 mg/dL    CREATININE 0.46 (L) 0.50 - 0.90 mg/dL    Bun/Cre Ratio NOT REPORTED 9 - 20    Calcium 9.8 8.6 - 10.4 mg/dL    Sodium 135 135 - 144 mmol/L    Potassium 3.1 (L) 3.7 - 5.3 mmol/L    Chloride 94 (L) 98 - 107 mmol/L    CO2 26 20 - 31 mmol/L    Anion Gap 15 9 - 17 mmol/L    Alkaline Phosphatase 145 (H) 35 - 104 U/L    ALT 10 5 - 33 U/L    AST 86 (H) <32 U/L    Total Bilirubin 0.75 0.3 - 1.2 mg/dL    Total Protein 6.0 (L) 6.4 - 8.3 g/dL    Alb 3.3 (L) 3.5 - 5.2 g/dL    Albumin/Globulin Ratio NOT REPORTED 1.0 - 2.5    GFR Non-African American >60 >60 mL/min    GFR African American >60 >60 mL/min    GFR Comment          GFR Staging NOT REPORTED    Protime-INR   Result Value Ref Range    Protime 12.5 (H) 9.7 - 12.0 sec    INR 1.2    Magnesium   Result Value Ref Range    Magnesium 1.7 1.6 - 2.6 mg/dL   Phosphorus   Result Value Ref Range    Phosphorus 3.6 2.6 - 4.5 mg/dL   CBC auto differential   Result Value Ref Range    WBC 12.2 (H) 3.5 - 11.0 k/uL    RBC 3.35 (L) 4.0 - 5.2 m/uL    Hemoglobin 10.1

## 2018-07-16 NOTE — PROGRESS NOTES
Patient returned to room 2065 from surgery. Vital signs are stable at this time. No signs of distress noted. Will continue to monitor.

## 2018-07-16 NOTE — ONCOLOGY
Images and report reviewed. No cord compression. Mets to vertebral bodies, femur and humeri. Plan. 1. Await ortho input and MRI femur. 2. Potential candidate for palliative RT to vertebra, femur and humeri. 3. Await pathology report. 4. As no cord compression then RT as outpatient. 5. Will schedule out-patient appointment.

## 2018-07-16 NOTE — ANESTHESIA POSTPROCEDURE EVALUATION
Department of Anesthesiology  Postprocedure Note    Patient: Yon Posada  MRN: [id number]  YOB: 1945  Date of evaluation: 7/16/2018  Time:  2:52 PM     Procedure Summary     Date:  07/16/18 Room / Location:  [address] / [address]    Anesthesia Start:  7525 Anesthesia Stop:  0224    Procedures:       BREAST LESION BIOPSY EXCISION (Right )      OSSEO COOL T1, T11, T12, L5 AND SACRUM, KYPHOPLASTY T1, T11, T12, L5 AND LUMBARSACRAL VERTEBRALPLASTY SACRUM (N/A ) Diagnosis:  (metastatic brest cancer)    Surgeon:  Vanessa Varma MD; Soto Caraballo MD Responsible Provider:  Michael Méndez MD    Anesthesia Type:  general ASA Status:  3          Anesthesia Type: general    Cecilia Phase I: Cecilia Score: 8    Cecilia Phase II:      Last vitals: Reviewed and per EMR flowsheets.        Anesthesia Post Evaluation    Comments: POST- ANESTHESIA EVALUATION       Pt Name: Yon Posada  MRN: [id number]  [place]: 1945  Date of evaluation: 7/16/2018  Time:  2:52 PM      BP (!) 153/68   Pulse 92   Temp 97 °F (36.1 °C) (Infrared)   Resp 10   Ht 5' 6\" (1.676 m)   Wt 137 lb (62.1 kg)   SpO2 96%   BMI 22.11 kg/m²      Consciousness Level  Awake  Cardiopulmonary Status  Stable  Pain Adequately Treated YES  Nausea / Vomiting  NO  Adequate Hydration  YES  Anesthesia Related Complications NONE      Electronically signed by Michael Méndez MD on 7/16/2018 at 2:52 PM

## 2018-07-16 NOTE — PROGRESS NOTES
[facility]   Occupational Therapy    Date: 2018  Patient Name: Gerhardt Reels        : 1945       [] Pt Refusal           [x] Pt Unavailable due to:       Per Dr Sykes note,   recommending MRI studies of both femurs, and bed rest until further studies completed    Latonya Bernal OTR/RIA Date: 2018

## 2018-07-16 NOTE — PROGRESS NOTES
[address] [facility].    PROGRESS NOTE             7/16/2018    8:58 AM    Name:   Danuta Broussard  MRN:     [id number]     Acct:      [de-identified]   Room:   [facility] OR Pool/NONE  IP Day:  1  Admit Date:  7/15/2018  8:59 AM    PCP:   Mara Torres MD  Code Status:  Full Code    Subjective:     C/C:   Chief Complaint   Patient presents with    Knee Pain     left side     Interval History Status: not changed. Patient is awake and resting comfortably on exam. She states that she slept well last night and that her pain is well controlled. She is scheduled for breast biopsy today at 1PM. No acute events overnight. Brief History:     80 yo female presenting with weakness, soreness and difficulty walking. Hx of breast mass for approximately 3 years that has not been evaluated. Patient admitted for evaluation and treatment of metastatic breast cancer. Review of Systems:     Review of Systems   Constitutional: Positive for malaise/fatigue. Negative for chills and fever. Respiratory: Negative for cough, shortness of breath and wheezing. Cardiovascular: Negative for chest pain and palpitations. Gastrointestinal: Negative for abdominal pain, diarrhea, nausea and vomiting. Musculoskeletal: Positive for back pain. Pain in bilateral lower extremities and joints   Skin:        Mass over the right breast.  Erythema and warmth extending from right breast mass upwards and into the neck. Neurological: Positive for weakness. Negative for dizziness and headaches. Medications:      Allergies:  No Known Allergies    Current Meds:   Scheduled Meds:    [MAR Hold] sodium chloride flush  10 mL Intravenous 2 times per day    [MAR Hold] enoxaparin  40 mg Subcutaneous Daily    [MAR Hold] amLODIPine  10 mg Oral Daily    [MAR Hold] dexamethasone  4 mg Oral 4 times per day     Continuous Infusions:    [MAR Hold] sodium chloride 75 mL/hr at FINDINGS: Metastatic survey was performed. Left humerus:  Lucent lesion is seen along the medial aspect of the left proximal humerus. Slightly heterogeneous appearance of the left femoral head is seen on the lateral view. Right humerus:  Subtle cortical irregularity is seen along the right mid humerus. Right lower extremity:  Subtle heterogeneous appearance of the right ischial tuberosity. Suggestion of a lucent lesion is seen within the right proximal femur. Subtle lucencies are seen within the mid to distal femur. Degenerative changes are seen within the right knee. Subtle lucency along the distal femoral metaphysis is suspicious for metastatic disease. No joint effusion. Left lower extremity:  Subtle lucency is seen within the mid femur. Irregularity is seen along the distal femur. Lucency is seen within the proximal femur. Cortical breakthrough is seen involving the greater trochanter. Pelvis:  Subtle lucencies throughout the pelvis are suspicious for osseous metastatic disease. Diffuse osseous metastatic disease. Findings may be related to known breast cancer. Multiple myeloma can have a similar appearance. Xr Humerus Left (min 2 Views)    Result Date: 7/15/2018  EXAMINATION: TWO XRAY VIEWS OF THE LEFT HUMERUS; TWO XRAY VIEWS OF THE RIGHT HUMERUS; SINGLE XRAY VIEW OF THE PELVIS; FOUR XRAY VIEWS OF THE RIGHT FEMUR; FOUR XRAY VIEWS OF THE LEFT FEMUR; 2 XRAY VIEWS OF THE LEFT KNEE 7/15/2018 7:56 pm COMPARISON: None. HISTORY: ORDERING SYSTEM PROVIDED HISTORY: lesions seen on CT chest TECHNOLOGIST PROVIDED HISTORY: Reason for exam:->lesions seen on CT chest Ordering Physician Provided Reason for Exam: pain Acuity: Unknown Type of Exam: Unknown Relevant Medical/Surgical History: mets FINDINGS: Metastatic survey was performed. Left humerus:  Lucent lesion is seen along the medial aspect of the left proximal humerus.   Slightly heterogeneous appearance of the left femoral head is seen on the lateral view. Right humerus:  Subtle cortical irregularity is seen along the right mid humerus. Right lower extremity:  Subtle heterogeneous appearance of the right ischial tuberosity. Suggestion of a lucent lesion is seen within the right proximal femur. Subtle lucencies are seen within the mid to distal femur. Degenerative changes are seen within the right knee. Subtle lucency along the distal femoral metaphysis is suspicious for metastatic disease. No joint effusion. Left lower extremity:  Subtle lucency is seen within the mid femur. Irregularity is seen along the distal femur. Lucency is seen within the proximal femur. Cortical breakthrough is seen involving the greater trochanter. Pelvis:  Subtle lucencies throughout the pelvis are suspicious for osseous metastatic disease. Diffuse osseous metastatic disease. Findings may be related to known breast cancer. Multiple myeloma can have a similar appearance. Xr Humerus Right (min 2 Views)    Result Date: 7/15/2018  EXAMINATION: TWO XRAY VIEWS OF THE LEFT HUMERUS; TWO XRAY VIEWS OF THE RIGHT HUMERUS; SINGLE XRAY VIEW OF THE PELVIS; FOUR XRAY VIEWS OF THE RIGHT FEMUR; FOUR XRAY VIEWS OF THE LEFT FEMUR; 2 XRAY VIEWS OF THE LEFT KNEE 7/15/2018 7:56 pm COMPARISON: None. HISTORY: ORDERING SYSTEM PROVIDED HISTORY: lesions seen on CT chest TECHNOLOGIST PROVIDED HISTORY: Reason for exam:->lesions seen on CT chest Ordering Physician Provided Reason for Exam: pain Acuity: Unknown Type of Exam: Unknown Relevant Medical/Surgical History: mets FINDINGS: Metastatic survey was performed. Left humerus:  Lucent lesion is seen along the medial aspect of the left proximal humerus. Slightly heterogeneous appearance of the left femoral head is seen on the lateral view. Right humerus:  Subtle cortical irregularity is seen along the right mid humerus. Right lower extremity:  Subtle heterogeneous appearance of the right ischial tuberosity.   Suggestion of a lucent lesion is seen within the right proximal femur. Subtle lucencies are seen within the mid to distal femur. Degenerative changes are seen within the right knee. Subtle lucency along the distal femoral metaphysis is suspicious for metastatic disease. No joint effusion. Left lower extremity:  Subtle lucency is seen within the mid femur. Irregularity is seen along the distal femur. Lucency is seen within the proximal femur. Cortical breakthrough is seen involving the greater trochanter. Pelvis:  Subtle lucencies throughout the pelvis are suspicious for osseous metastatic disease. Diffuse osseous metastatic disease. Findings may be related to known breast cancer. Multiple myeloma can have a similar appearance. Xr Femur Left (min 2 Views)    Result Date: 7/15/2018  EXAMINATION: TWO XRAY VIEWS OF THE LEFT HUMERUS; TWO XRAY VIEWS OF THE RIGHT HUMERUS; SINGLE XRAY VIEW OF THE PELVIS; FOUR XRAY VIEWS OF THE RIGHT FEMUR; FOUR XRAY VIEWS OF THE LEFT FEMUR; 2 XRAY VIEWS OF THE LEFT KNEE 7/15/2018 7:56 pm COMPARISON: None. HISTORY: ORDERING SYSTEM PROVIDED HISTORY: lesions seen on CT chest TECHNOLOGIST PROVIDED HISTORY: Reason for exam:->lesions seen on CT chest Ordering Physician Provided Reason for Exam: pain Acuity: Unknown Type of Exam: Unknown Relevant Medical/Surgical History: mets FINDINGS: Metastatic survey was performed. Left humerus:  Lucent lesion is seen along the medial aspect of the left proximal humerus. Slightly heterogeneous appearance of the left femoral head is seen on the lateral view. Right humerus:  Subtle cortical irregularity is seen along the right mid humerus. Right lower extremity:  Subtle heterogeneous appearance of the right ischial tuberosity. Suggestion of a lucent lesion is seen within the right proximal femur. Subtle lucencies are seen within the mid to distal femur. Degenerative changes are seen within the right knee.   Subtle lucency along the distal femoral metaphysis is suspicious for seen involving the greater trochanter. Pelvis:  Subtle lucencies throughout the pelvis are suspicious for osseous metastatic disease. Diffuse osseous metastatic disease. Findings may be related to known breast cancer. Multiple myeloma can have a similar appearance. Xr Knee Left (1-2 Views)    Result Date: 7/15/2018  EXAMINATION: TWO XRAY VIEWS OF THE LEFT HUMERUS; TWO XRAY VIEWS OF THE RIGHT HUMERUS; SINGLE XRAY VIEW OF THE PELVIS; FOUR XRAY VIEWS OF THE RIGHT FEMUR; FOUR XRAY VIEWS OF THE LEFT FEMUR; 2 XRAY VIEWS OF THE LEFT KNEE 7/15/2018 7:56 pm COMPARISON: None. HISTORY: ORDERING SYSTEM PROVIDED HISTORY: lesions seen on CT chest TECHNOLOGIST PROVIDED HISTORY: Reason for exam:->lesions seen on CT chest Ordering Physician Provided Reason for Exam: pain Acuity: Unknown Type of Exam: Unknown Relevant Medical/Surgical History: mets FINDINGS: Metastatic survey was performed. Left humerus:  Lucent lesion is seen along the medial aspect of the left proximal humerus. Slightly heterogeneous appearance of the left femoral head is seen on the lateral view. Right humerus:  Subtle cortical irregularity is seen along the right mid humerus. Right lower extremity:  Subtle heterogeneous appearance of the right ischial tuberosity. Suggestion of a lucent lesion is seen within the right proximal femur. Subtle lucencies are seen within the mid to distal femur. Degenerative changes are seen within the right knee. Subtle lucency along the distal femoral metaphysis is suspicious for metastatic disease. No joint effusion. Left lower extremity:  Subtle lucency is seen within the mid femur. Irregularity is seen along the distal femur. Lucency is seen within the proximal femur. Cortical breakthrough is seen involving the greater trochanter. Pelvis:  Subtle lucencies throughout the pelvis are suspicious for osseous metastatic disease. Diffuse osseous metastatic disease. Findings may be related to known breast cancer. HISTORY: evaluate for brain metastasis TECHNOLOGIST PROVIDED HISTORY: Ordering Physician Provided Reason for Exam: EVALUATE FOR BRAIN METS Acuity: Unknown Type of Exam: Unknown 28-year-old female; evaluate for brain metastasis FINDINGS: BRAIN/VENTRICLES: No abnormal extra-axial fluid collections identified. Mild diffuse prominence of the sulci, cisterns, and extra-axial spaces. Ventricles are mildly enlarged and midline in position. Atherosclerotic calcification of the distal vertebral and bilateral parasellar carotid arteries. No abnormal extra-axial fluid collections identified. No clear evidence for acute intracranial hemorrhage, mass, mass effect, or midline shift identified by CT. Foramen magnum and fourth ventricle appear patent. Mild to moderate periventricular and subcortical white matter hypoattenuation, most likely representing mild to moderate chronic small vessel ischemic white matter disease. ORBITS: The visualized portion of the orbits demonstrate no acute abnormality. SINUSES: The visualized paranasal sinuses and mastoid air cells demonstrate no acute abnormality. SOFT TISSUES/SKULL:  Edema/soft tissue thickening and induration of the subcutaneous fat, skin, and musculature of the right suboccipital soft tissue/neck. There is a lytic lesion of the right frontal calvarium on image 29, series 3 most likely representing osseous metastasis     1. Lytic lesion in the right frontal calvarium most likely representing osseous metastatic disease. 2. No obvious mass or midline shift identified on limited noncontrast CT. Assuming there are no contraindications to MRI or contrast, the most sensitive exam would be an MR brain with contrast to evaluate for intracranial metastasis. 3. No clear evidence for acute intracranial hemorrhage or midline shift. 4. Mild to moderate chronic small vessel ischemic white matter disease. Atherosclerotic calcification of the vasculature. Mild atrophy.  5. Edema, soft tissue thickening, induration of the subcutaneous fat, skin, and musculature of the right suboccipital neck/soft tissues, the cause of which is unclear. This could be related to malignancy, recent intervention, or infection depending upon the clinical setting. Ct Soft Tissue Neck W Contrast    Result Date: 7/15/2018  EXAMINATION: CT OF THE NECK SOFT TISSUE WITH CONTRAST  7/15/2018 TECHNIQUE: CT of the neck was performed with the administration of intravenous contrast. Multiplanar reformatted images are provided for review. Dose modulation, iterative reconstruction, and/or weight based adjustment of the mA/kV was utilized to reduce the radiation dose to as low as reasonably achievable. COMPARISON: None. HISTORY: ORDERING SYSTEM PROVIDED HISTORY: R. and left neck edema, breast mass, evaluate metastatic cancer TECHNOLOGIST PROVIDED HISTORY: Ordering Physician Provided Reason for Exam: LOOKING FOR METS, RT AND LT NECK EDEMA, BREAST MASS Acuity: Unknown Type of Exam: Unknown FINDINGS: PHARYNX/LARYNX: The nasopharynx, oropharynx, hypopharynx, and laryngeal structures are grossly unremarkable. There is a mild asymmetry on the right, likely secondary to below described mass. No evidence of retropharyngeal abscess. There is mild extension of above process into the lateral retropharyngeal space inferiorly. SALIVARY GLANDS/THYROID: There may be involvement of the right parotid gland with infiltrating mass coming in close proximity to the right submandibular _____  gland. The left submandibular gland and left parotid gland are unremarkable. The thyroid gland appears grossly unremarkable with small nonspecific left thyroid lesions. LYMPH NODES: There are mildly prominent nonspecific left-sided cervical lymph nodes. Limited evaluation of right cervical lymph node due to extensive infiltrating mass. SOFT TISSUES: There is an infiltrating mass within the right lateral and right posterior _____  extending to the midline.   This Follow-up MRI may be obtained as clinically warranted. Mediastinal lymphadenopathy would be better evaluated on a CT chest examination. Mild-to-moderate dilatation of the lateral ventricle is partially imaged. Please see separate CT head report. If there is a concern for intracranial metastasis, follow-up MRI brain with and without contrast may be obtained. Mri Thoracic Spine W Wo Contrast    Result Date: 7/15/2018  EXAMINATION: MRI OF THE THORACIC SPINE WITHOUT AND WITH CONTRAST; MRI OF THE LUMBAR SPINE WITHOUT AND WITH CONTRAST  7/15/2018 7:40 pm; 7/15/2018 7:43 pm TECHNIQUE: Multiplanar multisequence MRI of the thoracic spine was performed without and with the administration of intravenous contrast.; Multiplanar multisequence MRI of the lumbar spine was performed without and with the administration of intravenous contrast. COMPARISON: None HISTORY: ORDERING SYSTEM PROVIDED HISTORY: METASTASES TO SPINE TECHNOLOGIST PROVIDED HISTORY: Ordering Physician Provided Reason for Exam: mets to spine Additional signs and symptoms: history of breast cancer per patient FINDINGS: Thoracic spine: No acute fracture or traumatic subluxation is identified. There is mild to moderate multilevel degenerative disc disease characterized by disc desiccation, disc height loss, and osteophyte formation. There is a 6 mm left paracentral disc protrusion at T6-T7 and a 3 mm central disc protrusion at T7-T8. The thoracic cord is normal in size and signal intensity. Marrow replacing lesions are scattered throughout the thoracic spine including dominant lesions centered on the spinous processes of T3 and T4. No epidural collection or mass is identified. There is partially visualized mediastinal adenopathy and there are bilateral pleural effusions. Lumbar spine: No acute fracture is identified. Grade 1 anterolisthesis of L4 on L5 is likely degenerative in origin.  Expansile, marrow replacing lesions are seen scattered throughout the spine protrusion at T6-T7 and a 3 mm central disc protrusion at T7-T8. The thoracic cord is normal in size and signal intensity. Marrow replacing lesions are scattered throughout the thoracic spine including dominant lesions centered on the spinous processes of T3 and T4. No epidural collection or mass is identified. There is partially visualized mediastinal adenopathy and there are bilateral pleural effusions. Lumbar spine: No acute fracture is identified. Grade 1 anterolisthesis of L4 on L5 is likely degenerative in origin. Expansile, marrow replacing lesions are seen scattered throughout the spine including dominant lesions centered on the L3 and L4 spinous processes well as the right sacral wing. The lesion within the posterior aspect of the L5 vertebral body demonstrates extra osseous extension into the anterior epidural space measuring up to 4.3 mm in AP diameter on image number 22 of series 8. A uterine mass is partially visualized, probably a fibroid. There are sacral Tarlov cysts. There is multilevel degenerative disc disease characterized by disc desiccation, disc height loss, and osteophyte formation. A disc bulge results in mild narrowing of the thecal sac and both neural foramen at L2-3. A disc bulge and facet hypertrophy results in moderate narrowing of the thecal sac and the left neural foramen at L2-3. A disc bulge and facet hypertrophy results in mild to moderate narrowing of the thecal sac and mild-to-moderate narrowing of both the neural foramen at L4-5. 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 Ct Abdomen Pelvis W Iv Contrast    Result Date: 7/15/2018  EXAMINATION: CT OF THE ABDOMEN AND PELVIS WITH CONTRAST; CTA OF THE CHEST 7/15/2018 11:44 am; 7/15/2018 11:49 am TECHNIQUE: CT of the abdomen and pelvis was performed with the administration of intravenous contrast. Multiplanar reformatted images are provided for review.  Dose modulation, iterative reconstruction, lymphadenopathy involving the axillary regions as well as the mediastinum. In addition, there appears to be soft tissue infiltration involving the right chest extending into the thoracic inlet and neck also suggestive of metastatic disease with the right subclavian vein not clearly visualized. 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 No pathological fractures are noted. *Moderate bilateral pleural effusions possibly metastatic. *Pedunculated partially calcified fibroid uterus. Given the above findings, dedicated pelvic ultrasound is recommended to confirm findings. *No definitive CT evidence of intra-abdominal metastatic disease. Ct Chest Pulmonary Embolism W Contrast    Result Date: 7/15/2018  EXAMINATION: CT OF THE ABDOMEN AND PELVIS WITH CONTRAST; CTA OF THE CHEST 7/15/2018 11:44 am; 7/15/2018 11:49 am TECHNIQUE: CT of the abdomen and pelvis was performed with the administration of intravenous contrast. Multiplanar reformatted images are provided for review. Dose modulation, iterative reconstruction, and/or weight based adjustment of the mA/kV was utilized to reduce the radiation dose to as low as reasonably achievable.; CTA of the chest was performed after the administration of intravenous contrast.  Multiplanar reformatted images are provided for review. MIP images are provided for review. Dose modulation, iterative reconstruction, and/or weight based adjustment of the mA/kV was utilized to reduce the radiation dose to as low as reasonably achievable.  COMPARISON: None HISTORY: ORDERING SYSTEM PROVIDED HISTORY: breast mass, evaluate metastatic lesions TECHNOLOGIST PROVIDED HISTORY: IV Only Contrast Ordering Physician Provided Reason for Exam: BREAST MASS, EVALUATE METASTIC LESION Acuity: Unknown Type of Exam: Unknown; ORDERING SYSTEM PROVIDED HISTORY: evaluate for mass TECHNOLOGIST PROVIDED HISTORY: Ordering Physician Provided Reason for Exam: EVALUATE FOR MASS Acuity: Unknown Type of Exam: Unknown FINDINGS: Chest: Mediastinum: Thoracic aorta appears normal in caliber. Pulmonary trunk and its branches demonstrate no evidence of pulmonary embolism. Heart appears normal in size without pericardial effusion. Multiple prominent to enlarged mediastinal lymph nodes are identified, some which are partially calcified largest measuring 1.2 cm short axis within the right peritracheal region. No definite hilar lymphadenopathy. The esophagus is grossly unremarkable. Lungs/pleura: Moderate bilateral pleural effusions are noted with compressive atelectasis involving the lower lobes. Trachea and distal airways appear patent. No suspicious noncalcified lung nodule or mass. Scattered areas scarring are noted. Soft Tissues/Bones: A large ill-defined breast mass is seen involving the left breast, seen the majority of the left breast with partially calcified enlarged left axillary lymphadenopathy. There is associated skin thickening noted. There appears to be extension of this mass into the chest wall with loss of the pectoralis contours. There also appears to be prominent to enlarged left axillary lymph nodes largest measuring 1.4 cm in short axis. There is associated rib destruction involving the anterior aspect of the right 4th rib. Additional expansile lytic lesions are identified involving the right 5th, 8th and 10th ribs as well as the left 4th through 6th ribs. Additional lytic lesions are identified involving the T1, T12 and L1 vertebral bodies as well as the body of the sternum. Additional lytic lesions are also identified involving the visualized portions of the proximal humeri. In addition to the findings above, there appears to be soft tissue infiltration involving the right aspect of the chest extending into the thoracic inlet and along the anterior aspect of the inferior aspect of the neck which appears to extend into the superior mediastinum.   The right the right breast mass into the right neck. Significant right sided cervical lymphadenopathy. Cardiovascular: Normal rate, regular rhythm and normal heart sounds. No murmur heard. Pulmonary/Chest: Effort normal. No respiratory distress. She has no wheezes. Large, ulcerating, and necrotic mass over the anterior aspect of the right chest, overlying the breast. Warmth and induration to palpation. No tenderness over the mass   Abdominal: Soft. Bowel sounds are normal. She exhibits no distension. There is no tenderness. There is no rebound and no guarding. Musculoskeletal:   Lower extremity edema bilaterally   Neurological: She is alert and oriented to person, place, and time. Skin: Skin is warm and dry. She is not diaphoretic. Psychiatric: Mood and affect normal.         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 I have seen and examined the patient and the key elements of the encounter have been performed by me. I agree with the assessment, plan, and orders as documented by the resident.   She is getting a workup was to establish the extent of stage IV carcinoma breast.  Electronically signed by Felton Whitehead

## 2018-07-16 NOTE — ANESTHESIA PRE PROCEDURE
170/91   04/19/18 (!) 177/69       NPO Status: Time of last liquid consumption: 2345                        Time of last solid consumption: 2345                        Date of last liquid consumption: 07/15/18                        Date of last solid food consumption: 07/15/18    BMI:   Wt Readings from Last 3 Encounters:   07/15/18 137 lb (62.1 kg)   04/30/18 137 lb (62.1 kg)   04/19/18 150 lb (68 kg)     Body mass index is 22.11 kg/m².     CBC:   Lab Results   Component Value Date    WBC 12.2 07/16/2018    RBC 3.35 07/16/2018    HGB 10.1 07/16/2018    HCT 30.2 07/16/2018    MCV 89.9 07/16/2018    RDW 15.0 07/16/2018     07/16/2018       CMP:   Lab Results   Component Value Date     07/16/2018    K 3.4 07/16/2018    CL 95 07/16/2018    CO2 23 07/16/2018    BUN 7 07/16/2018    CREATININE 0.44 07/16/2018    GFRAA >60 07/16/2018    LABGLOM >60 07/16/2018    GLUCOSE 122 07/16/2018    PROT 6.0 07/15/2018    CALCIUM 9.2 07/16/2018    BILITOT 0.75 07/15/2018    ALKPHOS 145 07/15/2018    AST 86 07/15/2018    ALT 10 07/15/2018       POC Tests:   Recent Labs      07/15/18   2049   POCGLU  94       Coags:   Lab Results   Component Value Date    PROTIME 12.5 07/15/2018    INR 1.2 07/15/2018       HCG (If Applicable): No results found for: PREGTESTUR, PREGSERUM, HCG, HCGQUANT     ABGs: No results found for: PHART, PO2ART, UZI9PMO, EIA6RLB, BEART, M9IOFTJH     Type & Screen (If Applicable):  No results found for: MAR[facility]    Anesthesia Evaluation  Patient summary reviewed and Nursing notes reviewed no history of anesthetic complications:   Airway: Mallampati: IV  TM distance: >3 FB   Neck ROM: limited  Mouth opening: > = 3 FB Dental: normal exam         Pulmonary:Negative Pulmonary ROS and normal exam                               Cardiovascular:    (+) hypertension:,                   Neuro/Psych:   Negative Neuro/Psych ROS              GI/Hepatic/Renal: Neg GI/Hepatic/Renal ROS            Endo/Other:    (+) malignancy/cancer. ROS comment: Metastatic breast cancer Abdominal:           Vascular: negative vascular ROS. Anesthesia Plan      general     ASA 3       Induction: intravenous. MIPS: Postoperative opioids intended and Prophylactic antiemetics administered. Anesthetic plan and risks discussed with patient. Plan discussed with CRNA.                   Domingo Headley MD   7/16/2018

## 2018-07-16 NOTE — CONSULTS
Inpatient consult to Orthopedic Surgery  Consult performed by: [facility], [facility], [address] [place] [address] ordered by: Jana Davila    Reason for consult  Left knee pain    HPI / Chief Complaint  Merissa Paredes is a 81 y.o. old female who was admitted on 7/15/18 with generalized soreness and weakness. She states that she noticed a mass in her breast 3 years ago, but didn't seek any medical treatment due to other issues in her life ongoing at the time. On presentation in the ED she was noted to have a fungating mass involving the right breast. She indicates that since November of 2017 she's developed left knee pain that's progressively worsened. She's been using a walker recently to ambulate. Her pain in the left knee is primarily localized to the anterior knee. She also reports some pain in the right knee but to a lesser degree. Her pain doesn't radiate but is associated with some swelling and numbness/tingling in her feet. She denies pain in either upper extremity, her neck/back. She does report limited ROM through her neck. Past Medical History  Jose David Mora  has a past medical history of Breast cancer ([address] Essential hypertension; and Metastatic breast cancer ([facility] [address] Past Surgical History  Jose David Mora  has a past surgical history that includes Abdomen surgery. Current Medications  Reviewed. See EMR for details. Allergies  Allergies have been reviewed. Jose David Mora has No Known Allergies. Social History  Jose David Mora  reports that she has never smoked. She has never used smokeless tobacco. She reports that she drinks alcohol. She reports that she does not use drugs. Family History  Catherine's family history includes Cancer in her maternal grandmother; Colon Cancer (age of onset: [de-identified]) in her maternal grandfather; Prostate Cancer (age of onset: 79) in her father; Uterine Cancer (age of onset: 48) in her mother. Review of Systems   History obtained from the patient.    Constitution: no fever or chills  Musculoskeletal: As noted in the HPI   Neurologic: numbness, pain and weakness    Physical Exam  /67   Pulse 100   Temp 98.5 °F (36.9 °C)   Resp 16   Ht 5' 6\" (1.676 m)   Wt 137 lb (62.1 kg)   SpO2 98%   BMI 22.11 kg/m²   General Appearance: alert, well appearing, and in no distress  Mental Status: alert, oriented to person, place, and time  Evaluation of bilateral lower extremities demonstrate intact skin without any warmth or erythema. Mild swelling in both feet. Sensation is grossly intact to light touch diffusely. 2+ pedal pulses. Negative log roll bilaterally. +phani DF/PF of toes and feet. Left knee ROM 0- 110 degrees. Tender to palpation Medial aspect of the left knee. No effusion. No gross instability. TTP along anterior aspect of left thigh. Right knee ROM 0-125 degrees. Mild TTP medially. No effusion or gross instability. Cervical spine TTP along midline at C7. Limited flexion/extension and rotation. Imaging Studies  Xrays of bilateral femurs/knees/humerii from 7/15/18 were reviewed demonstrating multiple lytic lesions involving both femurs along the length of both bones. Several lytic lesions involving the proximal femur on the left. Lytic lesions involving the left and right humerus. CT scan of the neck from 7/15/18 demonstrates lytic lesions involving C7 and T1 vertebral bodies. Diagnostics and Labs  Relevant diagnostic, laboratory and radiological studies have been reviewed in the Electronic Medical Record. Impression and Plan  Hank Hui is a 81 y.o. old female with metastatic breast cancer involving both femurs, bilateral humeri, cervical spine and thoracic spine. I had a discussion with the patient today with regards to the nature and extent of her problem. Patient will probably benefit from a full body bone scan to assess the full extent of metastatic involvement. She heard he has had significant imaging involving multiple regions.   With involvement of her

## 2018-07-16 NOTE — PROGRESS NOTES
Physical Therapy  DATE: 2018    NAME: Ta Wagner  MRN: [id number]   : 1945    Patient not seen this date for Physical Therapy due to:  [] Blood transfusion in progress  [] Hemodialysis  []  Patient Declined  [] Spine Precautions   [] Strict Bedrest  [] Surgery/ Procedure  [] Testing      [x] Other Pt off the floor for test/procedure, Per Dr Tanvi Cohen note,   recommending MRI studies of both femurs, and bed rest until further studies completed. [] PT being discontinued at this time. Patient independent. No further needs. [] PT being discontinued at this time as the patient has been transferred to palliative care. No further needs.     Deep Bernal, PT

## 2018-07-16 NOTE — PROGRESS NOTES
Nutrition Assessment    Type and Reason for Visit: Positive Nutrition Screen (Possible unplanned weight loss and decreased appetite. Dietitian consult needed: poor intake and weight loss)    Nutrition Recommendations: Advance diet when appropriate and start oral nutrition supplement. Malnutrition Assessment:  · Malnutrition Status: Insufficient data    Nutrition Diagnosis:   · Problem: Increased nutrient needs  · Etiology: related to Catabolic illness     Signs and symptoms:  as evidenced by Weight loss    Nutrition Assessment:  · Subjective Assessment: RN reports patient has been out of room since this morning for breast biopsy, kyphoplasty and biopsy. Unsure if MRI will be done today. · Nutrition-Focused Physical Findings: +1 LLE, trace RLE edema  · Current Nutrition Therapies:  · Oral Diet Orders: NPO   · Oral Diet intake: NPO  · Anthropometric Measures:  · Ht: 5' 6\" (167.6 cm)   · Current Body Wt: 137 lb (62.1 kg)  · Ideal Body Wt: 130 lb (59 kg), % Ideal Body 101%  · BMI Classification: BMI 18.5 - 24.9 Normal Weight  · Comparative Standards (Estimated Nutrition Needs):  · Estimated Daily Total Kcal: 9401-0828  · Estimated Daily Protein (g): 75-87    Estimated Intake vs Estimated Needs: Intake Less Than Needs    Nutrition Risk Level: High    Nutrition Interventions:   Continue NPO  Continued Inpatient Monitoring    Nutrition Evaluation:   · Evaluation: Goals set   · Goals: PO intakes meet greater than 75% of estimated nutrition needs    · Monitoring: NPO Status, Weight, Diet Progression, Pertinent Labs, Diet Tolerance, Skin Integrity, Ascites/Edema    See Adult Nutrition Doc Flowsheet for more detail.      Talya MORALES, R.D, L.D,  Clinical Dietitian  Pager # [phone]

## 2018-07-17 PROBLEM — C79.51 METASTASIS TO BONE (HCC): Status: ACTIVE | Noted: 2018-01-01

## 2018-07-17 NOTE — PROGRESS NOTES
PROGRESS NOTE    PCP: Michael Paez MD  Referring Provider: No ref. provider found    IMPRESSION:   1. Adenocarcinoma of the breast locally extensive with widespread bony metastases  2. Generalized pain  3. Hypertension         INTERVAL HISTORY:   Multiple kyphoplasty is obtained  Biopsy obtained  Stabilization of long bones being planned      PLAN:     1. Initiate systemic treatment when ER/VA HER-2 values are known    No Follow-up on file. VISIT DIAGNOSIS:  The primary encounter diagnosis was Metastatic breast cancer ([facility] [address] Diagnoses of Malignant pleural effusion, SVC syndrome, and Hypokalemia were also pertinent to this visit.     PAST MEDICAL HISTORY:      Diagnosis Date    Breast cancer [facility])     neglected fungating mass    Essential hypertension 4/30/2018    Metastatic breast cancer ([facility] [address] 7/15/2018       PAST SURGICAL HISTORY:      Procedure Laterality Date    ABDOMEN SURGERY      exporatory in 80s    VA PERQ VERT [address] UNI/BI [facility] LMBR N/A 7/16/2018    OSTEOCOOL T1, T11, T12, L5 AND SACRUM, KYPHOPLASTY T1, T11, T12, L5 AND LUMBARSACRAL VERTEBROLPLASTY SACRUM performed by Isaac Peters MD at [address] [place] [address] 7/16/2018    BREAST BIOPSY performed by Yin Velasquez MD at [facility]:   Current Facility-Administered Medications   Medication Dose Route Frequency Provider Last Rate Last Dose    lisinopril (PRINIVIL;ZESTRIL) tablet 10 mg  10 mg Oral Daily Caitie Mariscal MD   10 mg at 07/17/18 1110    [START ON 7/18/2018] pantoprazole (PROTONIX) tablet 40 mg  40 mg Oral QAM AC MD Moira Swanson Edd) tablet 8.6 mg  1 tablet Oral Nightly Yin Vealsquez MD        lactated ringers infusion   Intravenous Continuous Isaac Peters  mL/hr at 07/17/18 0510      sodium chloride flush 0.9 % injection 10 mL  10 mL Intravenous 2 times per day Yin Velasquez MD   10 mL at 07/16/18 2137    sodium chloride flush 0.9 % injection 10 mL  10 mL Intravenous PRN Gordon Izaguirre MD        acetaminophen (TYLENOL) tablet 650 mg  650 mg Oral Q4H PRN Gordon Izaguirre MD        docusate sodium (COLACE) capsule 100 mg  100 mg Oral BID Gordon Izaguirre MD   100 mg at 07/16/18 2136    potassium chloride (KLOR-CON M) extended release tablet 40 mEq  40 mEq Oral PRN Ramírez Randhawa MD   40 mEq at 07/16/18 1753    Or    potassium chloride 20 MEQ/15ML (10%) oral solution 40 mEq  40 mEq Oral PRN Ramírez Randhawa MD        Or   [facility] potassium chloride 10 mEq/100 mL IVPB (Peripheral Line)  10 mEq Intravenous PRN Ramírez Randhawa MD        sodium chloride flush 0.9 % injection 10 mL  10 mL Intravenous 2 times per day Ramírez Randhawa MD   10 mL at 07/16/18 2137    sodium chloride flush 0.9 % injection 10 mL  10 mL Intravenous PRN Ramírez Randhawa MD        magnesium hydroxide (MILK OF MAGNESIA) 400 MG/5ML suspension 30 mL  30 mL Oral Daily PRN Ramírez Randhawa MD        ondansetron (ZOFRAN) injection 4 mg  4 mg Intravenous Q6H PRN Ramírez Randhawa MD        enoxaparin (LOVENOX) injection 40 mg  40 mg Subcutaneous Daily Ramírez Randhawa MD   40 mg at 07/15/18 1358    HYDROcodone-acetaminophen (NORCO) 5-325 MG per tablet 1 tablet  1 tablet Oral Q6H PRN Gordon Izaguirre MD        HYDROcodone-acetaminophen [facility]) 5-325 MG per tablet 2 tablet  2 tablet Oral Q4H PRN Gordon Izaguirre MD        LORazepam (ATIVAN) tablet 0.5 mg  0.5 mg Oral Q4H PRN Gordon Izaguirre MD   0.5 mg at 07/15/18 1637    fentaNYL (SUBLIMAZE) injection 25 mcg  25 mcg Intravenous Q1H PRN Gordon Izaguirre MD   25 mcg at 07/16/18 1936    fentaNYL (SUBLIMAZE) injection 50 mcg  50 mcg Intravenous Q1H PRN Gordon Izaguirre MD   50 mcg at 07/17/18 0509    fentaNYL (SUBLIMAZE) injection 100 mcg  100 mcg Intravenous Q1H PRN Gordon Izaguirre MD   100 mcg at 07/16/18 2333    amLODIPine (NORVASC) tablet 10 mg  10 mg Oral Daily Ramírez Randhawa MD   10 mg at 07/17/18 0732    dexamethasone (DECADRON) tablet 4 mg  4 mg Oral 4 times per day Kamryn Bailey MD 4 mg at 07/17/18 0510    sodium chloride flush 0.9 % injection 10 mL  10 mL Intravenous MEENA Bellamy MD   10 mL at 07/15/18 1938       SUBJECTIVE:  Patient is known of lesion on the right breast there is extensively spread locally across her chest into her neck down into her abdominal wall. Also his evidence of multiple bone metastases. The patient has essentially become bedridden is not able to get up because of weakness and pain. She is able to move her legs a little barely [employer] [place]. It is not clear if this is because of pain or because of weakness.     Her past medical history is fairly negative, she denies heart disease strokes diabetes liver problems or kidney problems. She does have hypertension. 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 She denies any prior surgeries. She has not had a pregnancy she has not been on hormone replacement therapy. There is a negative family history of breast cancer. She is not aware of any medication allergies. Her mother had cervical cancer father had prostate cancer maternal grandmother had pancreatic cancer and paternal grandfather had colon cancer.   She is a nonsmoker has never smoked regularly, she does not use alcohol.       ROS:  General: negative for fatigue, fever or night sweats  ENT: negative for headaches, hearing change, oral lesions or visual changes  Hematological and Lymphatic: negative for bleeding problems, blood clots, bruising, fatigue, night sweats or weight loss  Respiratory: negative for cough, shortness of breath or tachypnea  Cardiovascular: negative for chest pain, dyspnea on exertion, edema or paroxysmal nocturnal dyspnea  Gastrointestinal: negative for abdominal pain, appetite loss, change in bowel habits, constipation, diarrhea, melena or nausea/vomiting  Genito-Urinary: negative for dysuria, hematuria or incontinence  Musculoskeletal: negative for gait disturbance, joint pain, joint swelling or muscle pain  Neurological: negative for confusion, dizziness, headaches, seizures or tremors  Dermatological: negative for pruritus or rash      PHYSICAL EXAM:   Vitals: BP (!) 154/82   Pulse 101   Temp 98.2 °F (36.8 °C) (Oral)   Resp 16   Ht 5' 6\" (1.676 m)   Wt 137 lb (62.1 kg)   SpO2 94%   BMI 22.11 kg/m²   General appearance: alert, appears stated age and cooperative  Skin: Skin color, texture, turgor normal. No rashes or lesions  HEENT: Head: Normocephalic, no lesions, without obvious abnormality.   Neck: no adenopathy  Lungs: clear to auscultation bilaterally  Heart: regular rate and rhythm, S1, S2 normal, no murmur, click, rub or gallop  Abdomen: soft, non-tender; bowel sounds normal; no masses,  no organomegaly  Extremities: extremities normal, atraumatic, no cyanosis or edema  Neurologic: Mental status: Alert, oriented, thought content appropriate      LABS:   Results for orders placed or performed during the hospital encounter of 07/15/18   CBC with DIFF   Result Value Ref Range    WBC 10.8 3.5 - 11.0 k/uL    RBC 3.68 (L) 4.0 - 5.2 m/uL    Hemoglobin 11.3 (L) 12.0 - 16.0 g/dL    Hematocrit 32.9 (L) 36 - 46 %    MCV 89.3 80 - 100 fL    MCH 30.6 26 - 34 pg    MCHC 34.3 31 - 37 g/dL    RDW 14.9 11.5 - 14.9 %    Platelets 528 866 - 726 k/uL    MPV 7.1 6.0 - 12.0 fL    NRBC Automated NOT REPORTED per 100 WBC    Differential Type NOT REPORTED     Immature Granulocytes NOT REPORTED 0 %    Absolute Immature Granulocyte NOT REPORTED 0.00 - 0.30 k/uL    WBC Morphology NOT REPORTED     RBC Morphology NOT REPORTED     Platelet Estimate NOT REPORTED     Seg Neutrophils 66 36 - 66 %    Lymphocytes 28 24 - 44 %    Monocytes 6 1 - 7 %    Eosinophils % 0 0 - 4 %    Basophils 0 0 - 2 %    Segs Absolute 7.13 1.3 - 9.1 k/uL    Absolute Lymph # 3.02 1.0 - 4.8 k/uL    Absolute Mono # 0.65 0.1 - 1.3 k/uL    Absolute Eos # 0.00 0.0 - Lymphocytes Absolute 0.12 k/uL    Absolute Mono # 0.37 0.1 - 1.3 k/uL    Absolute Eos # 0.00 0.0 - 0.4 k/uL    Basophils # 0.00 0.0 - 0.2 k/uL    Morphology SMUDGE CELLS PRESENT    Basic Metabolic Panel w/ Reflex to MG   Result Value Ref Range    Glucose 122 (H) 70 - 99 mg/dL    BUN 7 (L) 8 - 23 mg/dL    CREATININE 0.44 (L) 0.50 - 0.90 mg/dL    Bun/Cre Ratio NOT REPORTED 9 - 20    Calcium 9.2 8.6 - 10.4 mg/dL    Sodium 133 (L) 135 - 144 mmol/L    Potassium 3.4 (L) 3.7 - 5.3 mmol/L    Chloride 95 (L) 98 - 107 mmol/L    CO2 23 20 - 31 mmol/L    Anion Gap 15 9 - 17 mmol/L    GFR Non-African American >60 >60 mL/min    GFR African American >60 >60 mL/min    GFR Comment          GFR Staging NOT REPORTED    Magnesium   Result Value Ref Range    Magnesium 1.8 1.6 - 2.6 mg/dL   Path Review, Smear   Result Value Ref Range    Pathologist Review TO BE REVIEWED BY PATHOLOGIST    CBC auto differential   Result Value Ref Range    WBC 27.9 (H) 3.5 - 11.0 k/uL    RBC 3.65 (L) 4.0 - 5.2 m/uL    Hemoglobin 10.8 (L) 12.0 - 16.0 g/dL    Hematocrit 33.1 (L) 36 - 46 %    MCV 90.6 80 - 100 fL    MCH 29.6 26 - 34 pg    MCHC 32.6 31 - 37 g/dL    RDW 15.1 (H) 11.5 - 14.9 %    Platelets 363 623 - 591 k/uL    MPV 7.3 6.0 - 12.0 fL    NRBC Automated NOT REPORTED per 100 WBC    Differential Type NOT REPORTED     Immature Granulocytes NOT REPORTED 0 %    Absolute Immature Granulocyte NOT REPORTED 0.00 - 0.30 k/uL    WBC Morphology NOT REPORTED     RBC Morphology NOT REPORTED     Platelet Estimate NOT REPORTED     Seg Neutrophils 47 36 - 66 %    Lymphocytes 49 (H) 24 - 44 %    Atypical Lymphocytes 2 %    Monocytes 2 1 - 7 %    Eosinophils % 0 0 - 4 %    Basophils 0 0 - 2 %    Segs Absolute 13.11 (H) 1.3 - 9.1 k/uL    Absolute Lymph # 13.67 (H) 1.0 - 4.8 k/uL    Atypical Lymphocytes Absolute 0.56 k/uL    Absolute Mono # 0.56 0.1 - 1.3 k/uL    Absolute Eos # 0.00 0.0 - 0.4 k/uL    Basophils # 0.00 0.0 - 0.2 k/uL    Morphology ANISOCYTOSIS PRESENT

## 2018-07-17 NOTE — PROGRESS NOTES
[address] [facility].    PROGRESS NOTE             7/17/2018    8:57 AM    Name:   Rosalia Arevalo  MRN:     [id number]     [place]ide:      [de-identified]   Room:   2065/2065-01   Day:  2  Admit Date:  7/15/2018  8:59 AM    PCP:   Andrea Olguin MD  Code Status:  Full Code    Subjective:     C/C:   Chief Complaint   Patient presents with    Knee Pain     left side     Interval History Status: not changed. Patient is awake, alert and oriented on exam this am. No acute events overnight. Pain is moderately well controlled. Complaining of dull right shoulder and arm pain that is new in onset and began after her right breast mass biopsy yesterday. Patient says that leg pain is well controlled. She has no other complaints at this time. Patient was tachycardic and hypertensive throughout the night. Brief History:     81year old female presenting with weakness, fatigue and pain in lower extremities secondary to metastatic breast cancer. Patient had surgical biopsy of breast mass on 7/16 and kyphoplasty of several vertebrae. Plan for nail hitesh in left femur on 7/17. Review of Systems:     Review of Systems   Constitutional: Positive for malaise/fatigue and weight loss. Negative for chills and fever. Respiratory: Negative for cough, sputum production, shortness of breath and wheezing. Cardiovascular: Negative for chest pain and palpitations. Gastrointestinal: Negative for abdominal pain, constipation, diarrhea, nausea and vomiting. Musculoskeletal:        Right shoulder and arm pain, dull in nature  Back soreness   Neurological: Negative for dizziness and headaches. Medications:      Allergies:  No Known Allergies    Current Meds:   Scheduled Meds:    lisinopril  10 mg Oral Daily    sodium chloride flush  10 mL Intravenous 2 times per day    docusate sodium  100 mg Oral BID    sodium chloride flush  10 mL Intravenous 2 times per day    procedure. Radiologist was not present during examination. FLUOROSCOPY DOSE AND TYPE OR TIME AND EXPOSURES: 527.7 seconds. 28.67 rad COMPARISON: None HISTORY: Intraprocedural imaging. FINDINGS: Twenty one spot images of the spine were obtained. Limited intraoperative fluoroscopic images demonstrate instruments and cement related to kyphoplasty performed at several levels. The exact levels are indeterminate based on the provided views. Sacral plasty also appears to have been performed. Please refer to the operative report for detailed description. Intraprocedural fluoroscopic spot images as above. See separate procedure report for more information. Xr Pelvis (1-2 Views)    Result Date: 7/15/2018  EXAMINATION: TWO XRAY VIEWS OF THE LEFT HUMERUS; TWO XRAY VIEWS OF THE RIGHT HUMERUS; SINGLE XRAY VIEW OF THE PELVIS; FOUR XRAY VIEWS OF THE RIGHT FEMUR; FOUR XRAY VIEWS OF THE LEFT FEMUR; 2 XRAY VIEWS OF THE LEFT KNEE 7/15/2018 7:56 pm COMPARISON: None. HISTORY: ORDERING SYSTEM PROVIDED HISTORY: lesions seen on CT chest TECHNOLOGIST PROVIDED HISTORY: Reason for exam:->lesions seen on CT chest Ordering Physician Provided Reason for Exam: pain Acuity: Unknown Type of Exam: Unknown Relevant Medical/Surgical History: mets FINDINGS: Metastatic survey was performed. Left humerus:  Lucent lesion is seen along the medial aspect of the left proximal humerus. Slightly heterogeneous appearance of the left femoral head is seen on the lateral view. Right humerus:  Subtle cortical irregularity is seen along the right mid humerus. Right lower extremity:  Subtle heterogeneous appearance of the right ischial tuberosity. Suggestion of a lucent lesion is seen within the right proximal femur. Subtle lucencies are seen within the mid to distal femur. Degenerative changes are seen within the right knee. Subtle lucency along the distal femoral metaphysis is suspicious for metastatic disease. No joint effusion.  Left lower Subtle lucencies throughout the pelvis are suspicious for osseous metastatic disease. Diffuse osseous metastatic disease. Findings may be related to known breast cancer. Multiple myeloma can have a similar appearance. Xr Humerus Right (min 2 Views)    Result Date: 7/15/2018  EXAMINATION: TWO XRAY VIEWS OF THE LEFT HUMERUS; TWO XRAY VIEWS OF THE RIGHT HUMERUS; SINGLE XRAY VIEW OF THE PELVIS; FOUR XRAY VIEWS OF THE RIGHT FEMUR; FOUR XRAY VIEWS OF THE LEFT FEMUR; 2 XRAY VIEWS OF THE LEFT KNEE 7/15/2018 7:56 pm COMPARISON: None. HISTORY: ORDERING SYSTEM PROVIDED HISTORY: lesions seen on CT chest TECHNOLOGIST PROVIDED HISTORY: Reason for exam:->lesions seen on CT chest Ordering Physician Provided Reason for Exam: pain Acuity: Unknown Type of Exam: Unknown Relevant Medical/Surgical History: mets FINDINGS: Metastatic survey was performed. Left humerus:  Lucent lesion is seen along the medial aspect of the left proximal humerus. Slightly heterogeneous appearance of the left femoral head is seen on the lateral view. Right humerus:  Subtle cortical irregularity is seen along the right mid humerus. Right lower extremity:  Subtle heterogeneous appearance of the right ischial tuberosity. Suggestion of a lucent lesion is seen within the right proximal femur. Subtle lucencies are seen within the mid to distal femur. Degenerative changes are seen within the right knee. Subtle lucency along the distal femoral metaphysis is suspicious for metastatic disease. No joint effusion. Left lower extremity:  Subtle lucency is seen within the mid femur. Irregularity is seen along the distal femur. Lucency is seen within the proximal femur. Cortical breakthrough is seen involving the greater trochanter. Pelvis:  Subtle lucencies throughout the pelvis are suspicious for osseous metastatic disease. Diffuse osseous metastatic disease. Findings may be related to known breast cancer.   Multiple myeloma can have a similar appearance. Xr Femur Left (min 2 Views)    Result Date: 7/15/2018  EXAMINATION: TWO XRAY VIEWS OF THE LEFT HUMERUS; TWO XRAY VIEWS OF THE RIGHT HUMERUS; SINGLE XRAY VIEW OF THE PELVIS; FOUR XRAY VIEWS OF THE RIGHT FEMUR; FOUR XRAY VIEWS OF THE LEFT FEMUR; 2 XRAY VIEWS OF THE LEFT KNEE 7/15/2018 7:56 pm COMPARISON: None. HISTORY: ORDERING SYSTEM PROVIDED HISTORY: lesions seen on CT chest TECHNOLOGIST PROVIDED HISTORY: Reason for exam:->lesions seen on CT chest Ordering Physician Provided Reason for Exam: pain Acuity: Unknown Type of Exam: Unknown Relevant Medical/Surgical History: mets FINDINGS: Metastatic survey was performed. Left humerus:  Lucent lesion is seen along the medial aspect of the left proximal humerus. Slightly heterogeneous appearance of the left femoral head is seen on the lateral view. Right humerus:  Subtle cortical irregularity is seen along the right mid humerus. Right lower extremity:  Subtle heterogeneous appearance of the right ischial tuberosity. Suggestion of a lucent lesion is seen within the right proximal femur. Subtle lucencies are seen within the mid to distal femur. Degenerative changes are seen within the right knee. Subtle lucency along the distal femoral metaphysis is suspicious for metastatic disease. No joint effusion. Left lower extremity:  Subtle lucency is seen within the mid femur. Irregularity is seen along the distal femur. Lucency is seen within the proximal femur. Cortical breakthrough is seen involving the greater trochanter. Pelvis:  Subtle lucencies throughout the pelvis are suspicious for osseous metastatic disease. Diffuse osseous metastatic disease. Findings may be related to known breast cancer. Multiple myeloma can have a similar appearance.      Xr Femur Right (min 2 Views)    Result Date: 7/15/2018  EXAMINATION: TWO XRAY VIEWS OF THE LEFT HUMERUS; TWO XRAY VIEWS OF THE RIGHT HUMERUS; SINGLE XRAY VIEW OF THE PELVIS; FOUR XRAY VIEWS OF THE RIGHT FEMUR; FOUR XRAY VIEWS OF THE LEFT FEMUR; 2 XRAY VIEWS OF THE LEFT KNEE 7/15/2018 7:56 pm COMPARISON: None. HISTORY: ORDERING SYSTEM PROVIDED HISTORY: lesions seen on CT chest TECHNOLOGIST PROVIDED HISTORY: Reason for exam:->lesions seen on CT chest Ordering Physician Provided Reason for Exam: pain Acuity: Unknown Type of Exam: Unknown Relevant Medical/Surgical History: mets FINDINGS: Metastatic survey was performed. Left humerus:  Lucent lesion is seen along the medial aspect of the left proximal humerus. Slightly heterogeneous appearance of the left femoral head is seen on the lateral view. Right humerus:  Subtle cortical irregularity is seen along the right mid humerus. Right lower extremity:  Subtle heterogeneous appearance of the right ischial tuberosity. Suggestion of a lucent lesion is seen within the right proximal femur. Subtle lucencies are seen within the mid to distal femur. Degenerative changes are seen within the right knee. Subtle lucency along the distal femoral metaphysis is suspicious for metastatic disease. No joint effusion. Left lower extremity:  Subtle lucency is seen within the mid femur. Irregularity is seen along the distal femur. Lucency is seen within the proximal femur. Cortical breakthrough is seen involving the greater trochanter. Pelvis:  Subtle lucencies throughout the pelvis are suspicious for osseous metastatic disease. Diffuse osseous metastatic disease. Findings may be related to known breast cancer. Multiple myeloma can have a similar appearance. Xr Knee Left (1-2 Views)    Result Date: 7/15/2018  EXAMINATION: TWO XRAY VIEWS OF THE LEFT HUMERUS; TWO XRAY VIEWS OF THE RIGHT HUMERUS; SINGLE XRAY VIEW OF THE PELVIS; FOUR XRAY VIEWS OF THE RIGHT FEMUR; FOUR XRAY VIEWS OF THE LEFT FEMUR; 2 XRAY VIEWS OF THE LEFT KNEE 7/15/2018 7:56 pm COMPARISON: None.  HISTORY: ORDERING SYSTEM PROVIDED HISTORY: lesions seen on CT chest TECHNOLOGIST PROVIDED HISTORY: Reason for administration of intravenous contrast.; Multiplanar multisequence MRI of the lumbar spine was performed without and with the administration of intravenous contrast. COMPARISON: None HISTORY: ORDERING SYSTEM PROVIDED HISTORY: METASTASES TO SPINE TECHNOLOGIST PROVIDED HISTORY: Ordering Physician Provided Reason for Exam: mets to spine Additional signs and symptoms: history of breast cancer per patient FINDINGS: Thoracic spine: No acute fracture or traumatic subluxation is identified. There is mild to moderate multilevel degenerative disc disease characterized by disc desiccation, disc height loss, and osteophyte formation. There is a 6 mm left paracentral disc protrusion at T6-T7 and a 3 mm central disc protrusion at T7-T8. The thoracic cord is normal in size and signal intensity. Marrow replacing lesions are scattered throughout the thoracic spine including dominant lesions centered on the spinous processes of T3 and T4. No epidural collection or mass is identified. There is partially visualized mediastinal adenopathy and there are bilateral pleural effusions. Lumbar spine: No acute fracture is identified. Grade 1 anterolisthesis of L4 on L5 is likely degenerative in origin. Expansile, marrow replacing lesions are seen scattered throughout the spine including dominant lesions centered on the L3 and L4 spinous processes well as the right sacral wing. The lesion within the posterior aspect of the L5 vertebral body demonstrates extra osseous extension into the anterior epidural space measuring up to 4.3 mm in AP diameter on image number 22 of series 8. A uterine mass is partially visualized, probably a fibroid. There are sacral Tarlov cysts. There is multilevel degenerative disc disease characterized by disc desiccation, disc height loss, and osteophyte formation. A disc bulge results in mild narrowing of the thecal sac and both neural foramen at L2-3.  A disc bulge and facet hypertrophy results in moderate narrowing the right sacral wing. The lesion within the posterior aspect of the L5 vertebral body demonstrates extra osseous extension into the anterior epidural space measuring up to 4.3 mm in AP diameter on image number 22 of series 8. A uterine mass is partially visualized, probably a fibroid. There are sacral Tarlov cysts. There is multilevel degenerative disc disease characterized by disc desiccation, disc height loss, and osteophyte formation. A disc bulge results in mild narrowing of the thecal sac and both neural foramen at L2-3. A disc bulge and facet hypertrophy results in moderate narrowing of the thecal sac and the left neural foramen at L2-3. A disc bulge and facet hypertrophy results in mild to moderate narrowing of the thecal sac and mild-to-moderate narrowing of both the neural foramen at L4-5. 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 Ct Abdomen Pelvis W Iv Contrast    Result Date: 7/15/2018  EXAMINATION: CT OF THE ABDOMEN AND PELVIS WITH CONTRAST; CTA OF THE CHEST 7/15/2018 11:44 am; 7/15/2018 11:49 am TECHNIQUE: CT of the abdomen and pelvis was performed with the administration of intravenous contrast. Multiplanar reformatted images are provided for review. Dose modulation, iterative reconstruction, and/or weight based adjustment of the mA/kV was utilized to reduce the radiation dose to as low as reasonably achievable.; CTA of the chest was performed after the administration of intravenous contrast.  Multiplanar reformatted images are provided for review. MIP images are provided for review. Dose modulation, iterative reconstruction, and/or weight based adjustment of the mA/kV was utilized to reduce the radiation dose to as low as reasonably achievable.  COMPARISON: None HISTORY: ORDERING SYSTEM PROVIDED HISTORY: breast mass, evaluate metastatic lesions TECHNOLOGIST PROVIDED HISTORY: IV Only Contrast Ordering Physician Provided Reason for Exam: BREAST MASS, EVALUATE METASTIC LESION Acuity: Unknown Type of Exam: Unknown; ORDERING SYSTEM PROVIDED HISTORY: evaluate for mass TECHNOLOGIST PROVIDED HISTORY: Ordering Physician Provided Reason for Exam: EVALUATE FOR MASS Acuity: Unknown Type of Exam: Unknown FINDINGS: Chest: Mediastinum: Thoracic aorta appears normal in caliber. Pulmonary trunk and its branches demonstrate no evidence of pulmonary embolism. Heart appears normal in size without pericardial effusion. Multiple prominent to enlarged mediastinal lymph nodes are identified, some which are partially calcified largest measuring 1.2 cm short axis within the right peritracheal region. No definite hilar lymphadenopathy. The esophagus is grossly unremarkable. Lungs/pleura: Moderate bilateral pleural effusions are noted with compressive atelectasis involving the lower lobes. Trachea and distal airways appear patent. No suspicious noncalcified lung nodule or mass. Scattered areas scarring are noted. Soft Tissues/Bones: A large ill-defined breast mass is seen involving the left breast, seen the majority of the left breast with partially calcified enlarged left axillary lymphadenopathy. There is associated skin thickening noted. There appears to be extension of this mass into the chest wall with loss of the pectoralis contours. There also appears to be prominent to enlarged left axillary lymph nodes largest measuring 1.4 cm in short axis. There is associated rib destruction involving the anterior aspect of the right 4th rib. Additional expansile lytic lesions are identified involving the right 5th, 8th and 10th ribs as well as the left 4th through 6th ribs. Additional lytic lesions are identified involving the T1, T12 and L1 vertebral bodies as well as the body of the sternum. Additional lytic lesions are also identified involving the visualized portions of the proximal humeri.  In addition to the findings above, there appears to be soft tissue infiltration involving the right aspect of the chest extending into the thoracic inlet and along the anterior aspect of the inferior aspect of the neck which appears to extend into the superior mediastinum. The right subclavian vein is not clearly identified. Abdomen/Pelvis: Organs: No enhancing liver lesion is noted. Portal vein, gallbladder, spleen, pancreas and adrenal glands all appear unremarkable. Kidneys appear unremarkable. Abdominal aorta appears normal in caliber. GI/Bowel: Stomach is grossly unremarkable. Small bowel appears nondilated. No acute colonic abnormality is seen. Appendix not clearly identified. Pelvis: Fibroid uterus is seen with a pedunculated fibroid which is partially calcified. No adnexal mass or cyst.  Urinary bladder is grossly unremarkable. Peritoneum/Retroperitoneum: No free air or free fluid. No lymphadenopathy. Bones/Soft Tissues: Abdominal wall demonstrates body wall anasarca. Lytic lesions are identified throughout the lumbar spine and bony pelvis and hips with associated cortical destruction largest seen along the right iliac wing measuring 4.0 x 3.4 cm. No pathological fractures are noted. *Large ill-defined right breast mass with associated skin thickening extension into the chest wall. There is associated metastatic lymphadenopathy involving the axillary regions as well as the mediastinum. In addition, there appears to be soft tissue infiltration involving the right chest extending into the thoracic inlet and neck also suggestive of metastatic disease with the right subclavian vein not clearly visualized. 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 No pathological fractures are noted. *Moderate bilateral pleural effusions possibly metastatic. *Pedunculated partially calcified fibroid uterus. Given the above findings, dedicated pelvic ultrasound is recommended to confirm findings.  *No definitive CT evidence of intra-abdominal metastatic disease. Ct Chest Pulmonary Embolism W Contrast    Result Date: 7/15/2018  EXAMINATION: CT OF THE ABDOMEN AND PELVIS WITH CONTRAST; CTA OF THE CHEST 7/15/2018 11:44 am; 7/15/2018 11:49 am TECHNIQUE: CT of the abdomen and pelvis was performed with the administration of intravenous contrast. Multiplanar reformatted images are provided for review. Dose modulation, iterative reconstruction, and/or weight based adjustment of the mA/kV was utilized to reduce the radiation dose to as low as reasonably achievable.; CTA of the chest was performed after the administration of intravenous contrast.  Multiplanar reformatted images are provided for review. MIP images are provided for review. Dose modulation, iterative reconstruction, and/or weight based adjustment of the mA/kV was utilized to reduce the radiation dose to as low as reasonably achievable. COMPARISON: None HISTORY: ORDERING SYSTEM PROVIDED HISTORY: breast mass, evaluate metastatic lesions TECHNOLOGIST PROVIDED HISTORY: IV Only Contrast Ordering Physician Provided Reason for Exam: BREAST MASS, EVALUATE METASTIC LESION Acuity: Unknown Type of Exam: Unknown; ORDERING SYSTEM PROVIDED HISTORY: evaluate for mass TECHNOLOGIST PROVIDED HISTORY: Ordering Physician Provided Reason for Exam: EVALUATE FOR MASS Acuity: Unknown Type of Exam: Unknown FINDINGS: Chest: Mediastinum: Thoracic aorta appears normal in caliber. Pulmonary trunk and its branches demonstrate no evidence of pulmonary embolism. Heart appears normal in size without pericardial effusion. Multiple prominent to enlarged mediastinal lymph nodes are identified, some which are partially calcified largest measuring 1.2 cm short axis within the right peritracheal region. No definite hilar lymphadenopathy. The esophagus is grossly unremarkable. Lungs/pleura: Moderate bilateral pleural effusions are noted with compressive atelectasis involving the lower lobes. Trachea and distal airways appear patent.   No suspicious noncalcified lung nodule or mass. Scattered areas scarring are noted. Soft Tissues/Bones: A large ill-defined breast mass is seen involving the left breast, seen the majority of the left breast with partially calcified enlarged left axillary lymphadenopathy. There is associated skin thickening noted. There appears to be extension of this mass into the chest wall with loss of the pectoralis contours. There also appears to be prominent to enlarged left axillary lymph nodes largest measuring 1.4 cm in short axis. There is associated rib destruction involving the anterior aspect of the right 4th rib. Additional expansile lytic lesions are identified involving the right 5th, 8th and 10th ribs as well as the left 4th through 6th ribs. Additional lytic lesions are identified involving the T1, T12 and L1 vertebral bodies as well as the body of the sternum. Additional lytic lesions are also identified involving the visualized portions of the proximal humeri. In addition to the findings above, there appears to be soft tissue infiltration involving the right aspect of the chest extending into the thoracic inlet and along the anterior aspect of the inferior aspect of the neck which appears to extend into the superior mediastinum. The right subclavian vein is not clearly identified. Abdomen/Pelvis: Organs: No enhancing liver lesion is noted. Portal vein, gallbladder, spleen, pancreas and adrenal glands all appear unremarkable. Kidneys appear unremarkable. Abdominal aorta appears normal in caliber. GI/Bowel: Stomach is grossly unremarkable. Small bowel appears nondilated. No acute colonic abnormality is seen. Appendix not clearly identified. Pelvis: Fibroid uterus is seen with a pedunculated fibroid which is partially calcified. No adnexal mass or cyst.  Urinary bladder is grossly unremarkable. Peritoneum/Retroperitoneum: No free air or free fluid. No lymphadenopathy.  Bones/Soft Tissues: Abdominal wall Please refer to the operative report for detailed description. Intraprocedural fluoroscopic spot images as above. See separate procedure report for more information. Mri Femur Right Wo Contrast    Result Date: 7/16/2018  EXAMINATION: MRI OF THE RIGHT FEMUR WITHOUT CONTRAST; MRI OF THE LEFT FEMUR WITHOUT CONTRAST, 7/16/2018 9:02 pm TECHNIQUE: Multiplanar multisequence MRI of the right femur was performed without the administration of intravenous contrast.; Multiplanar multisequence MRI of the left femur was performed without the administration of intravenous contrast. COMPARISON: Bilateral femur radiographs 07/15/2018. HISTORY: ORDERING SYSTEM PROVIDED HISTORY: right femur metastasis TECHNOLOGIST PROVIDED HISTORY: Ordering Physician Provided Reason for Exam: right femur metastasis FINDINGS: SOFT TISSUES: Mild likely reactive edema in the bilateral proximal adductor muscles and left quadriceps musculature. No abnormal soft tissue mass or drainable fluid collection. The visualized tendons are grossly intact. BONE MARROW: There are scattered metastatic lesions throughout the bilateral femora. The largest lesion in the right femur involves the entire intertrochanteric portion and extends into the lower aspect of the femoral neck and proximal diaphysis. Smaller scattered metastatic lesions throughout the right femur. A large metastatic lesion is also seen in the left intertrochanteric femur and lower femoral neck. A large metastatic lesion in the distal left femoral shaft demonstrates anterior cortical thinning and penetration into the adjacent soft tissues. There is mild periosteal edema. Metastatic lesions also seen in the bilateral ischial tuberosities. Large metastatic lesion in the left medial femoral condyles. No definite fracture. No dislocation. 1. Diffusely scattered metastatic lesions versus myeloma throughout the bilateral femora and also involving the bilateral ischial tuberosities.  2. Large lesions in the bilateral intertrochanteric femora and lower femoral necks place the patient at increased risk for pathologic fracture. 3. Large lesion in the distal left femoral shaft with anterior cortical thinning and penetration into the adjacent soft tissues. This also places the patient at high risk for pathologic fracture. Physical Examination:        Physical Exam   Constitutional: She is oriented to person, place, and time. No distress. HENT:   Head: Normocephalic and atraumatic. Erythema, warmth and induration extending from the right breast mass into the right neck. Eyes: EOM are normal. Pupils are equal, round, and reactive to light. Cardiovascular: Normal rate and regular rhythm. Pulmonary/Chest: Effort normal and breath sounds normal. No respiratory distress. She has no wheezes. Large, ulcerating, and necrotic mass over the anterior aspect of the right chest, overlying the breast. Warmth and induration to palpation. No tenderness over the mass. Covered in bandage this am   Abdominal: She exhibits no distension. There is no tenderness. There is no rebound and no guarding. Musculoskeletal:   Lower extremity edema   Neurological: She is alert and oriented to person, place, and time. Skin: She is not diaphoretic.    Psychiatric: Mood and affect normal.         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 with routine healing [M84.58XD] 07/16/2018    Pathological fracture of sacral vertebra [facility] 07/16/2018    Pathological fracture of thoracic vertebra with routine healing [M84.48XD] 07/16/2018    Metastatic breast cancer ([address] [C50.919] 07/15/2018    Closed fracture of first thoracic vertebra ([address] [S22.019A] 07/15/2018    Pathological fracture of vertebra due to neoplastic disease [M84.58XA] 07/15/2018       Plan:        Metastatic breast cancer  - Pain control with Norco and fentanyl for breakthrough  - Zofran prn nausea  - Ativan prn anxiety  - Decadron 4mg q6hr, WBC 27.9 from 12.2, likely reactive leukocytosis  - MRI bilateral femurs shows metastatic disease and risk of pathologic fractures. Patient will be taken to OR for left femur nail hitesh insertion, scheduled for 3PM today. - f/u breast biopsy  - f/u recs from rad/onc, palliative, heme/onc, orthopaedic surgery and general surgery     Hyponatremia  - SNa 131 (from 133)  - Monitor BMP    HT  - Norvasc 10 mg daily  - Lisinopril 10 mg daily     DVT prophylaxis  - Lovenox    Delmy Oneal MD  7/17/2018  8:57 AM     Attending Physician Statement    I have discussed the case of Stewart Tate, including pertinent history and exam findings with the resident. I have seen and examined the patient and the key elements of the encounter have been performed by me. I agree with the assessment, plan, and orders as documented by the resident. She has no acute distress today, stage IV breast carcinoma, oncology evaluation was noted.   She has a pathological hip fracture and is having hip pinning today  Electronically signed by Charity Shanks MD on 7/17/2018 at 5:08 PM

## 2018-07-17 NOTE — OP NOTE
[address] [facility]                   [address] [facility], [address]                                 OPERATIVE REPORT    PATIENT NAME: Stoney Mae                      :        1945  MED REC NO:   [id number]                              ROOM:       2065  ACCOUNT NO:   [de-identified]                           ADMIT DATE: 07/15/2018  PROVIDER:     Stoney Olivo    DATE OF PROCEDURE:  2018    PREOPERATIVE DIAGNOSES:  Pathologic fracture T1 and L5 with impending  pathologic fractures T11, T12 and the sacrum. POSTOPERATIVE DIAGNOSES:  Pathologic fracture T1 and L5 with impending  pathologic fractures T11, T12 and the sacrum. OPERATION PERFORMED:  1. Kyphoplasty with fluoroscopic assistance and OsteoCool ablation of T1,  T11, T12 and L5.  2.  Lumbosacral vertebroplasty S1 with OsteoCool fluoroscopic assistance  and biopsy. Biopsies were obtained at all levels. OPERATING SURGEON:  Stoney Olivo MD    ANESTHESIA:  General.    ESTIMATED BLOOD LOSS:  Less than 50. COMPLICATIONS:  None. SPECIMENS:  Combined specimen was sent of all biopsy sites. FINDINGS:  1. The patient with good cement fill left side T11 and T12 where the  pathologic lesions were. 2.  Good fill of the right S1 where the patient had a large osteolytic  mass. 3.  Satisfactory fill of T1.  4.  Very satisfactory fill bipedicularly at L5.  5.  OsteoCool was utilized for local tumor control at T1, T11, T12, L5 and  the sacrum. PROCEDURE IN DETAIL:  The patient was taken to the operating room, placed  under general anesthesia, transferred to the Jerry table, checked for  padding and positioning. Back was prepped and draped in the usual sterile  fashion. Utilizing biplanar fluoroscopy, 18-gauge needle was advanced from  the skin to the pedicle docking area at T1. Back injected with local  anesthetic.   T1 was then cannulated from the left hand side as the  patient's left hand side of the vertebral body was more severely affected. With minimal biplanar views at docking, mid pedicle, posterior wall and  final resting position. Post cannulating T1, a biopsy was obtained. Attention was then directed at cannulating T11 and T12. Both of these were  cannulated to the left hand side as the patient had left side lytic lesions  with impending pathologic fractures and attention was predominantly  directed to left half of the hemivertebrae. Post cannulating these areas, we cannulated S1 through a cephaladly  directed cannula originating at the caudal aspect of S2 up to S1. Biopsy  was obtained post cannulating the sacrum. Balloon was inflated under AP,  lateral fluoroscopic direction. After cannulating _____ and S1, radiofrequency ablation with the OsteoCool  was performed. We then directed our attention at T11 and T12, these were cannulated. Having completed the OsteoCool at T1 and S1, balloons were placed and  inflated at T1 and S1, T1 on the left, S1 on the right. Cement was mixed. The OsteoCool procedure was then performed at T11 and T12 while we cemented  first T1 and then S1. T1 was filled with cement, after a modest amount of  cement, we had some cement start to leak anterolaterally with cephalad  extension outside of the T1 vertebral body. We therefore allowed the  cement to harden and then injected just a little bit more posterior to this  cement mantle to try and get better interdigitation of the already fracture  T1. Having completed the cement kyphoplasty at T1, the C-arms were brought to  perform the lumbosacral vertebroplasty of S1. Cement was injected under  live lateral, intermittent AP fluoroscopy. Good cement fill and  interdigitation occurred. Attention was now directed at cannulating L5. L5 was cannulated in a  similar fashion with first placement of spinal needles. Back injected with  local anesthetic.   The needles were placed with biplanar

## 2018-07-17 NOTE — OP NOTE
a depth gauge was used to measure the length of the required screw off of the guidepin. A  hole for the helical blade was then drilled over the guidepin and a 11 x 80 mm helical blade was inserted over the guidepin under fluoroscopic guidance, again to appropriate depth in a center center position in the patient's femoral head. The set screw was then advanced to prevent any rotation or sliding. Using percutaneous techniques two 5 mm distal interlocking screw were inserted through the distal end of the nail, one through the proximal static hole and the other through the dynamic. Intraoperative fluoroscopy was used throughout the case to confirm appropriate positioning of the nail as well as screws. Satisfied, the patient's incisions were thoroughly irrigated using copious amounts of sterile saline. Layered closure of the incisions were then performed using 0 Vicryl for deep tissue, 2-0 Vicryl for subcutaneous tissue closure, and staples on skin. Sterile dressings were applied using Xeroform gauze, 4 x 4 gauze, and Tegaderm. Drapes were taken down at this point. The patient's left lower extremity was then placed in well leg rodriguez while the right lower extremity was placed in the traction device. The right lower extremity was prepped and draped in the standard sterile fashion and a 9 x 380 mm Synthes TFNa with an 11 x 80 mm helical blade and 2 distal 5 mm interlocking screws were inserted using the same technique as outlined above. The patient was awoken, transferred to her bed, and wheeled to recovery in stable condition.     Complications: None    Post-operative Condition: Stable to recovery

## 2018-07-17 NOTE — PROGRESS NOTES
Second chlorhexidine gluconate wash performed to Bilateral lower extremities, and hips, gown changed. Will continue to monitor.

## 2018-07-17 NOTE — PROGRESS NOTES
lumbar vertebra ([facility]    Pathological fracture of vertebra due to neoplastic disease with routine healing    Pathological fracture of sacral vertebra    Pathological fracture of thoracic vertebra with routine healing           Plan  1. GI prophylaxis proton pump inhibitor per orders, pharmacologic prophylaxis (with any of the following: enoxaparin (Lovenox) 40mg SQ 2h prior to surgery then QD) and intermittent pneumatic compression boots  2. normal diet as tolerated  3.  Going back to OR for bilateral femoral rods to stabalized bone    Electronically signed by Kyle Alvarez MD  on 7/17/2018 at 11:08 AM

## 2018-07-17 NOTE — PROGRESS NOTES
Physical Therapy  DATE: 2018    NAME: Hosea Sanchez  MRN: [id number]   : 1945    Patient not seen this date for Physical Therapy due to:  [] Blood transfusion in progress  [] Hemodialysis  []  Patient Declined  [] Spine Precautions   [] Strict Bedrest  [x] Surgery/ Procedure  [] Testing      [x] Other Pt scheduled for B femur IM Nail Rickey today, check post op orders tomorrow. [] PT being discontinued at this time. Patient independent. No further needs. [] PT being discontinued at this time as the patient has been transferred to palliative care. No further needs.     Burke Disla, PT

## 2018-07-17 NOTE — PROGRESS NOTES
[facility]   OCCUPATIONAL THERAPY MISSED TREATMENT NOTE   INPATIENT   Date: 18  Patient Name: Skylar Cabrera       Room: 1489/6427-19  MRN: [id number]   Account #: [de-identified]    : 1945  (71 y.o.)  Gender: female      REASON FOR MISSED TREATMENT:  Patient unable to participate   -   Surgery  Pt to have B IMN this afternoon. Defer OT evaluation pending post-op orders.     Sumeet Pryor

## 2018-07-18 NOTE — PROGRESS NOTES
KE spoke to Fortunato Tolliver from [facility] she will start pre-cert this day but therapy notes will be needed for authorization.

## 2018-07-18 NOTE — PROGRESS NOTES
PROGRESS NOTE    PCP: Frannie Jama MD  Referring Provider: No ref. provider found    IMPRESSION:   1. Reda Drone of the breast locally extensive with widespread bony metastases  2.  Generalized pain  3.  Hypertension         INTERVAL HISTORY:   Multiple kyphoplasty is obtained  Biopsy report pending  Stabilization of long bones accomplished bilaterally in femurs         PLAN:     1. We'll planned systemic treatment once biopsy report is available  2. Discussed with patient and family member    No Follow-up on file. VISIT DIAGNOSIS:  The primary encounter diagnosis was Metastatic breast cancer ([facility] [address] Diagnoses of Malignant pleural effusion, SVC syndrome, and Hypokalemia were also pertinent to this visit.     PAST MEDICAL HISTORY:      Diagnosis Date    Breast cancer [facility])     neglected fungating mass    Essential hypertension 4/30/2018    Metastatic breast cancer ([facility] [address] 7/15/2018       PAST SURGICAL HISTORY:      Procedure Laterality Date    ABDOMEN SURGERY      exporatory in 80s    SC PERQ [address] UNI/BI CANNULJ LMBR N/A 7/16/2018    OSTEOCOOL T1, T11, T12, L5 AND SACRUM, KYPHOPLASTY T1, T11, T12, L5 AND LUMBARSACRAL VERTEBROLPLASTY SACRUM performed by Naman Hess MD at [address] [place] [address] 7/16/2018    BREAST BIOPSY performed by Keily Wagner MD at [facility]:   Current Facility-Administered Medications   Medication Dose Route Frequency Provider Last Rate Last Dose    lisinopril (PRINIVIL;ZESTRIL) tablet 10 mg  10 mg Oral Daily Adrien Perales MD   10 mg at 07/18/18 1037    pantoprazole (PROTONIX) tablet 40 mg  40 mg Oral QAM AC Keily Wagner MD   40 mg at 07/18/18 0602    senna (SENOKOT) tablet 8.6 mg  1 tablet Oral Nightly Keily Wagner MD   8.6 mg at 07/17/18 2217    lactated ringers infusion   Intravenous Continuous Blayne Okeefe MD 75 mL/hr at 07/18/18 0941      sodium chloride flush 0.9 % injection 10 mL  10 mL (H) 9.7 - 12.0 sec    INR 1.2    Magnesium   Result Value Ref Range    Magnesium 1.7 1.6 - 2.6 mg/dL   Phosphorus   Result Value Ref Range    Phosphorus 3.6 2.6 - 4.5 mg/dL   CBC auto differential   Result Value Ref Range    WBC 12.2 (H) 3.5 - 11.0 k/uL    RBC 3.35 (L) 4.0 - 5.2 m/uL    Hemoglobin 10.1 (L) 12.0 - 16.0 g/dL    Hematocrit 30.2 (L) 36 - 46 %    MCV 89.9 80 - 100 fL    MCH 30.0 26 - 34 pg    MCHC 33.3 31 - 37 g/dL    RDW 15.0 (H) 11.5 - 14.9 %    Platelets 208 605 - 020 k/uL    MPV 7.2 6.0 - 12.0 fL    NRBC Automated NOT REPORTED per 100 WBC    Differential Type NOT REPORTED     Immature Granulocytes NOT REPORTED 0 %    Absolute Immature Granulocyte NOT REPORTED 0.00 - 0.30 k/uL    WBC Morphology NOT REPORTED     RBC Morphology NOT REPORTED     Platelet Estimate NOT REPORTED     Seg Neutrophils 47 36 - 66 %    Lymphocytes 49 (H) 24 - 44 %    Atypical Lymphocytes 1 %    Monocytes 3 1 - 7 %    Eosinophils % 0 0 - 4 %    Basophils 0 0 - 2 %    Segs Absolute 5.73 1.3 - 9.1 k/uL    Absolute Lymph # 5.98 (H) 1.0 - 4.8 k/uL    Atypical Lymphocytes Absolute 0.12 k/uL    Absolute Mono # 0.37 0.1 - 1.3 k/uL    Absolute Eos # 0.00 0.0 - 0.4 k/uL    Basophils # 0.00 0.0 - 0.2 k/uL    Morphology SMUDGE CELLS PRESENT    Basic Metabolic Panel w/ Reflex to MG   Result Value Ref Range    Glucose 122 (H) 70 - 99 mg/dL    BUN 7 (L) 8 - 23 mg/dL    CREATININE 0.44 (L) 0.50 - 0.90 mg/dL    Bun/Cre Ratio NOT REPORTED 9 - 20    Calcium 9.2 8.6 - 10.4 mg/dL    Sodium 133 (L) 135 - 144 mmol/L    Potassium 3.4 (L) 3.7 - 5.3 mmol/L    Chloride 95 (L) 98 - 107 mmol/L    CO2 23 20 - 31 mmol/L    Anion Gap 15 9 - 17 mmol/L    GFR Non-African American >60 >60 mL/min    GFR African American >60 >60 mL/min    GFR Comment          GFR Staging NOT REPORTED    Magnesium   Result Value Ref Range    Magnesium 1.8 1.6 - 2.6 mg/dL   Path Review, Smear   Result Value Ref Range    Pathologist Review TO BE REVIEWED BY PATHOLOGIST    CBC auto

## 2018-07-18 NOTE — PROGRESS NOTES
Contacted Dr. Milly Yang through [employer] to inform him of patient's transfer to 2065 from PACU; inquired if he would like to order a diet for the patient.

## 2018-07-18 NOTE — PROGRESS NOTES
Physical Therapy    Facility/Department: [facility]  Initial Assessment    NAME: Arsh Rhodes  : 1945  MRN: [id number]    Date of Service: 2018    Discharge Recommendations:  IP Rehab (Pt wants to go to [facility]-she knows the staff .)   PT Equipment Recommendations  Other: TBD    Patient Diagnosis(es): The primary encounter diagnosis was Metastatic breast cancer ([facility] [address] Diagnoses of Malignant pleural effusion, SVC syndrome, and Hypokalemia were also pertinent to this visit. has a past medical history of Breast cancer ([facility] [address] Essential hypertension; and Metastatic breast cancer ([facility] [address] has a past surgical history that includes Abdomen surgery; pr removal of breast lesion (Right, 2018); and pr perq vert agmntj cavity crtj uni/bi cannulj lmbr (N/A, 2018). Restrictions  Restrictions/Precautions  Restrictions/Precautions: Weight Bearing  Lower Extremity Weight Bearing Restrictions  Right Lower Extremity Weight Bearing: Weight Bearing As Tolerated  Left Lower Extremity Weight Bearing: Partial Weight Bearing  Partial Weight Bearing Percentage Or Pounds: significant pain at knee with intial position changes   Position Activity Restriction  Spinal Precautions: No Bending  Spinal Precautions: s/p mutilpe level kyphoplasty   Other position/activity restrictions: s/p Bilateral prophalatic Femoral IMN procedures for metastatic breast Ca. Per Dr Luis Alfredo Morgan note- May get up with PT. PWB on the right and WBAT on the left, which is different from orders in the computer. Notified pt's Nurse to clarify with Dr Deion Proctor. Also follow up tomorrow with Nurse/clin lead for further clarification.    Vision/Hearing  Vision: Impaired  Vision Exceptions: Wears glasses at all times  Hearing: Within functional limits     Subjective  General  Patient assessed for rehabilitation services?: Yes  Additional Pertinent Hx: 81year old female presenting with weakness, fatigue and pain in lower extremities secondary to     Leisure & Hobbies: reading    Additional Comments: Pt reports she was independent with all house work chores/IADL'S, until lately when it got harder due to back and leg pain, tried  the best lately. Knew she needed help. Objective          PROM RLE (degrees)  RLE General PROM: Pt in too much pain, apprehensive in anticipation of pain, gentle movement R hip. Hip flexion tolerated to 60 degrees, Hip Abd to 10 degrees, able to achieve R knee flexion ~ 80 degrees seated, ankle WFL. AROM RLE (degrees)  RLE General AROM: Pt able to move ankle actively  PROM LLE (degrees)  LLE General PROM: Pt more aprehensive for L LE movement, tolertes Hip flexion to 35 degrees, Hip Abd to 5 to7 degrees, Left knee flexion upto 70 degrees, ankle WFL. AROM LLE (degrees)  LLE General AROM: able to move ankle actively  Strength RLE  Comment: Unable to perform MMT due to severe pain, pt able to perform LAQ's in small range on R side. Strength LLE  Comment: Unable to perform MMT, pt not tolerating assisted LAQ's in seated postion due to pain at knee/thigh. Bed mobility  Rolling to Left: Unable to assess  Rolling to Right: Unable to assess  Supine to Sit: Maximum assistance;Dependent/Total (2 person assist, one person assisted B LE, 2nd person assisted trunk.)  Sit to Supine: Dependent/Total (2 person , sit to supine)  Scooting: Dependent/Total  Comment: Pt initially moaning, with every little movement, but as mobility progressed, less apprehensive, less moaning. sao2 drops in 80's without supplemental o2, might be from pain and anxiety. Continued mobility with 2 lt o2. Transfers  Sit to Stand: Maximum Assistance (x2, bed height raised up ~ 3 to 4 inches.)  Stand to sit: Maximum Assistance (x2, cues and assisted needed to lift L LE forward during stand to sit due to PWB L LE.)  Bed to Chair: Maximum assistance (x2)  Comment: RW used for transfers.   Ambulation  Ambulation?: Yes  WB Status: WBAT/FWB R LE, PWB L

## 2018-07-18 NOTE — CONSULTS
and how that differs from hospice. Pt is asymptomatic currently and i'm not sure how beneficial outpatient palliative care would be for symptom management but I told the patient she may find this helpful once she starts treatment. Brochure for outpatient palliative care given to patient. We then discussed advanced directives and patient states she thinks she has a DPOA (brother). Pt has never been  and has no children. I expressed importance of a decision maker for the future and she agrees. She does not have a living will and she will think about filling one out while she is here. Emotional support offered and will follow. Pt to go to rehab upon discharge. Goals/Plan of care  Education/support to patient  Discharge planning/helping to coordinate care  Communications with primary service  Providing support for coping/adaptation/distress of patient  Discussing meaning/purpose   Continue with current plan of care  Clarification of medical condition to patient and family  Code status clarified: Full Code  Provided information about hospice  Discussed outpatient palliative care programs to help with symptom managment. She will think about this. Pt thinks she has DPOA but may fill out living will while she is here. Pt asymptomatic at this time.        Palliative Care Coordinator  Gabi Espinoza RN, [address] [place] [address] Office: [address] Office: [phone]  Work Cell Phone: [phone]    For Symptom Management Clinic scheduling please call [phone]

## 2018-07-18 NOTE — PROGRESS NOTES
Patient transferred to 2065 from PACU. Vitals completed, physical assessment completed. Patient shows no signs of distress.

## 2018-07-18 NOTE — PROGRESS NOTES
[facility]   OCCUPATIONAL THERAPY MISSED TREATMENT NOTE   INPATIENT   Date: 18  Patient Name: Boo Montes       Room:   MRN: [id number]   Account #: [de-identified]    : 1945  (71 y.o.)  Gender: female      REASON FOR MISSED TREATMENT:  NEEDS EVAL 18 Now Post-OP - awaiting clarification of Activity / Weight bearing orders            Pt having B IMN this afternoon, await post-op orders;      attempt, on bedrest pend MRI   -     Now post OP Prophalatic Bilateral IMN femur  [facility] OT

## 2018-07-18 NOTE — PROGRESS NOTES
Pt refuses to roll or get out of bed for waffle mattress.  She states she is \" too tired from therapy\"

## 2018-07-18 NOTE — PROGRESS NOTES
OCCUPATIONAL THERAPY -  PAC  Daily Activity  Used For G-Code Determination  Date: 2018  Patient Name: Jason Schwartz      MRN: [id number]  Account #: [de-identified]  : 1945  (81 y.o.)Gender: female     How much help from another person does the patient currently need? (Higher the score, the more Independent) Admission   Score Goal  Score Discharge  Score   1. Putting on and taking off regular lower body clothing? 1 3    2. Bathing (including washing, rinsing, drying)? 2 3    3. Toileting, which includes using toilet, bedpan, or urinal? 1 3    4. Putting on and taking off regular upper body clothing? 2 3    5. Taking care of personal grooming such as brushing teeth? 3 4    6. Eating meals? 4 4    Total Score -  Modifier    13  CL 20  CJ    OT Self-Care Functions Charge #  G Code   [id number]  OT  [id number]  OT  [id number]  OT        1. Unable =  Total/Dependent Assist    2. A Lot =   Maximum/Moderate Assist   3. A Little =  Minimum/Contact Guard Assist/Supervision     4.  None =  Modified [place]/Independent    Raw Score Scale Score Scale Score Standard Error Approximate Degree of Functional Impairment Modifier   24 57.54 7.36 0% CH   23 51.12 5.88 16% CI   22-20 47.10-42.03 4.81-3.55 26%-38% CJ   19-15 40.22-34.69 3.20-2.65 43%-56% CK   14-10 33.39-27.13 2.61-2.96 60%-75% CL   9-7 25.33-20.13 3.17-3.68 80%-92% CM   6 17.07 3.74 100% IVONNE Pate OT

## 2018-07-18 NOTE — PROGRESS NOTES
Left Hand AROM (degrees)  Left Hand AROM: WFL  RUE Strength  Gross RUE Strength: WFL  R Hand Grasp: 5/5   Right Hand Strength -  (lbs)  Handle Setting 2: 50# (norms 32-54# )   RUE Tone: Normotonic     RUE AROM (degrees)  RUE AROM : WFL  RUE General AROM: Humeral Mets      Right Hand AROM (degrees)  Right Hand AROM: WFL    Fine Motor Skills  Coordination  Movements Are Fluid And Coordinated:  Yes                           Mobility  Supine to Sit: Maximum assistance, Dependent/Total       Balance  Sitting Balance: Contact guard assistance  Standing Balance  Sit to stand: Dependent/Total (2 person assist )  Stand to sit: Dependent/Total (2 person assist )     Bed mobility  Rolling to Left: Maximum assistance  Rolling to Right: Maximum assistance  Supine to Sit: Maximum assistance;Dependent/Total  Scooting: Maximal assistance     Transfers  Sit to stand: Dependent/Total (2 person assist )  Stand to sit: Dependent/Total (2 person assist )  Transfer Comments: left knee pain when not supported from under her foot when sitting EOB/Chair       Assessment  Performance deficits / Impairments: Decreased functional mobility , Decreased ADL status, Decreased strength, Decreased safe awareness, Decreased endurance  Treatment Diagnosis: Impaired ability to Care for Self   Prognosis: Fair, Good  Decision Making: High Complexity  History: Extensive chart review related to multiple procedures and multiple metastatic sites   Exam: 5 Areas of Impaired Performance   Assistance / Modification: Assist for all Positioining and mobiltiy tasks due to pain   Patient Education: Therapy POC,  Modified care strategies, DME / AE     REQUIRES OT FOLLOW UP: Yes  Discharge Recommendations: [address] (Due to likely longer rehab process due to pain may need Skilled Nursing facility  - limited home assist/suport;  eventual Ca treatment    )  Activity Tolerance: Patient limited by fatigue, Patient limited by pain  Activity Tolerance: Attempted all tasks but did require extra time in response to pain in left knee with reporitioning and transfers           G CODE  AM-PAC Daily Activity Inpatient   How much help for putting on and taking off regular lower body clothing?: Total  How much help for Bathing?: A Lot  How much help for Toileting?: Total  How much help for putting on and taking off regular upper body clothing?: A Lot  How much help for taking care of personal grooming?: A Little  How much help for eating meals?: None  AM-[facility] Inpatient Daily Activity Raw Score: 13  AM-PAC Inpatient ADL T-Scale Score : 32.03  ADL Inpatient CMS 0-100% Score: 63.03  ADL Inpatient CMS G-Code Modifier : CL  OT G-codes  Functional Assessment Tool Used: AM PAC Daily Activity   Score: 13  Functional Limitation: Self care  Self Care Current Status (): At least 60 percent but less than 80 percent impaired, limited or restricted  Self Care Goal Status ():  At least 20 percent but less than 40 percent impaired, limited or restricted    Goals  Patient Goals   Patient goals : Regain Mobility with minimal pain    Short term goals  Time Frame for Short term goals: 2-4 days   Short term goal 1: Tolerate 10-25 minutes of General UB activity / Exercise to increase tolerance to care tasks / mobiltiy   Short term goal 2: D/V understanding of  DME / [place]  / Modified care strategies for self care and mobility   Short term goal 3: D/V understanding of home safety and fall prevention technqiues with application to care needs   Short term goal 4: Participate in seated self care and mobility tasksusing safe techniques for self care     Plan  Safety Devices  Safety Devices in place: Yes  Type of devices: Left in chair, Call light within reach, Nurse notified     Plan  Times per week: 2-5 sessions   Times per day: Daily  Current Treatment Recommendations: Strengthening, Endurance Training, Patient/Caregiver Education & Training, Self-Care / ADL, Pain Management,

## 2018-07-18 NOTE — PROGRESS NOTES
related to known breast cancer. Multiple myeloma can have a similar appearance. Xr Hip Left (2-3 Views)    Result Date: 7/17/2018  EXAMINATION: 2 XRAY VIEWS OF THE LEFT HIP 7/17/2018 6:14 pm COMPARISON: None. HISTORY: ORDERING SYSTEM PROVIDED HISTORY: L hip fx; L hip TFN TECHNOLOGIST PROVIDED HISTORY: Intra-OP Reason for exam:->L hip fx; L hip TFN Ordering Physician Provided Reason for Exam: L hip fx; L hip TFN, Fl Time: 1 min 26 sec, Dose: 906. 6mRad, Images: 5 FINDINGS: 6 fluoroscopic spot films demonstrate an intramedullary hitesh in the femur. Please correlate with clinical service     Xr Hip Right (2-3 Views)    Result Date: 7/17/2018  EXAMINATION: 2 XRAY VIEWS OF THE RIGHT HIP 7/17/2018 6:14 pm COMPARISON: None. HISTORY: ORDERING SYSTEM PROVIDED HISTORY: R hip fx; R hip TFN TECHNOLOGIST PROVIDED HISTORY: Intra-OP Reason for exam:->R hip fx; R hip TFN Ordering Physician Provided Reason for Exam: L hip fx; L hip TFN, Fl Time: 1 min 26 sec, Dose: 906. 6mRad, Images: 5 FINDINGS: There is an intramedullary hitesh and compression screw in the femoral neck. ORIF. Please correlate with clinical service. Xr Femur Left (min 2 Views)    Result Date: 7/17/2018  EXAMINATION: To XRAY VIEWS OF THE LEFT FEMUR 7/17/2018 9:39 pm COMPARISON: None. HISTORY: ORDERING SYSTEM PROVIDED HISTORY: sp left femur IMN TECHNOLOGIST PROVIDED HISTORY: Reason for exam:->sp left femur IMN Ordering Physician Provided Reason for Exam: left femur IMN Acuity: Acute Type of Exam: Initial Additional signs and symptoms: post op FINDINGS: There is an intramedullary hitesh and compression screw in the femur. Skin staples and soft tissue swelling are noted laterally. There is subcutaneous gas laterally as well. Cortical margins intact. Postop changes as above and probable hematoma laterally.      Xr Femur Left (min 2 Views)    Result Date: 7/15/2018  EXAMINATION: TWO XRAY VIEWS OF THE LEFT HUMERUS; TWO XRAY VIEWS OF THE RIGHT HUMERUS; SINGLE XRAY VIEW OF THE PELVIS; FOUR XRAY VIEWS OF THE RIGHT FEMUR; FOUR XRAY VIEWS OF THE LEFT FEMUR; 2 XRAY VIEWS OF THE LEFT KNEE 7/15/2018 7:56 pm COMPARISON: None. HISTORY: ORDERING SYSTEM PROVIDED HISTORY: lesions seen on CT chest TECHNOLOGIST PROVIDED HISTORY: Reason for exam:->lesions seen on CT chest Ordering Physician Provided Reason for Exam: pain Acuity: Unknown Type of Exam: Unknown Relevant Medical/Surgical History: mets FINDINGS: Metastatic survey was performed. Left humerus:  Lucent lesion is seen along the medial aspect of the left proximal humerus. Slightly heterogeneous appearance of the left femoral head is seen on the lateral view. Right humerus:  Subtle cortical irregularity is seen along the right mid humerus. Right lower extremity:  Subtle heterogeneous appearance of the right ischial tuberosity. Suggestion of a lucent lesion is seen within the right proximal femur. Subtle lucencies are seen within the mid to distal femur. Degenerative changes are seen within the right knee. Subtle lucency along the distal femoral metaphysis is suspicious for metastatic disease. No joint effusion. Left lower extremity:  Subtle lucency is seen within the mid femur. Irregularity is seen along the distal femur. Lucency is seen within the proximal femur. Cortical breakthrough is seen involving the greater trochanter. Pelvis:  Subtle lucencies throughout the pelvis are suspicious for osseous metastatic disease. Diffuse osseous metastatic disease. Findings may be related to known breast cancer. Multiple myeloma can have a similar appearance. Xr Femur Right (min 2 Views)    Result Date: 7/17/2018  EXAMINATION: 17 July 2018 at 1615 hours XRAY VIEWS OF THE RIGHT FEMUR 7/17/2018 9:39 pm COMPARISON: None.  HISTORY: ORDERING SYSTEM PROVIDED HISTORY: sp right femur IMN TECHNOLOGIST PROVIDED HISTORY: Reason for exam:->sp right femur IMN Ordering Physician Provided Reason for Exam: right femur IMN Acuity: Acute Type right knee. Subtle lucency along the distal femoral metaphysis is suspicious for metastatic disease. No joint effusion. Left lower extremity:  Subtle lucency is seen within the mid femur. Irregularity is seen along the distal femur. Lucency is seen within the proximal femur. Cortical breakthrough is seen involving the greater trochanter. Pelvis:  Subtle lucencies throughout the pelvis are suspicious for osseous metastatic disease. Diffuse osseous metastatic disease. Findings may be related to known breast cancer. Multiple myeloma can have a similar appearance. Xr Knee Left (1-2 Views)    Result Date: 7/15/2018  EXAMINATION: TWO XRAY VIEWS OF THE LEFT HUMERUS; TWO XRAY VIEWS OF THE RIGHT HUMERUS; SINGLE XRAY VIEW OF THE PELVIS; FOUR XRAY VIEWS OF THE RIGHT FEMUR; FOUR XRAY VIEWS OF THE LEFT FEMUR; 2 XRAY VIEWS OF THE LEFT KNEE 7/15/2018 7:56 pm COMPARISON: None. HISTORY: ORDERING SYSTEM PROVIDED HISTORY: lesions seen on CT chest TECHNOLOGIST PROVIDED HISTORY: Reason for exam:->lesions seen on CT chest Ordering Physician Provided Reason for Exam: pain Acuity: Unknown Type of Exam: Unknown Relevant Medical/Surgical History: mets FINDINGS: Metastatic survey was performed. Left humerus:  Lucent lesion is seen along the medial aspect of the left proximal humerus. Slightly heterogeneous appearance of the left femoral head is seen on the lateral view. Right humerus:  Subtle cortical irregularity is seen along the right mid humerus. Right lower extremity:  Subtle heterogeneous appearance of the right ischial tuberosity. Suggestion of a lucent lesion is seen within the right proximal femur. Subtle lucencies are seen within the mid to distal femur. Degenerative changes are seen within the right knee. Subtle lucency along the distal femoral metaphysis is suspicious for metastatic disease. No joint effusion. Left lower extremity:  Subtle lucency is seen within the mid femur.  Irregularity is seen along the would be an MR brain with contrast to evaluate for intracranial metastasis. 3. No clear evidence for acute intracranial hemorrhage or midline shift. 4. Mild to moderate chronic small vessel ischemic white matter disease. Atherosclerotic calcification of the vasculature. Mild atrophy. 5. Edema, soft tissue thickening, induration of the subcutaneous fat, skin, and musculature of the right suboccipital neck/soft tissues, the cause of which is unclear. This could be related to malignancy, recent intervention, or infection depending upon the clinical setting. Ct Soft Tissue Neck W Contrast    Result Date: 7/17/2018  EXAMINATION: CT OF THE NECK SOFT TISSUE WITH CONTRAST  7/15/2018 TECHNIQUE: CT of the neck was performed with the administration of intravenous contrast. Multiplanar reformatted images are provided for review. Dose modulation, iterative reconstruction, and/or weight based adjustment of the mA/kV was utilized to reduce the radiation dose to as low as reasonably achievable. COMPARISON: None. HISTORY: ORDERING SYSTEM PROVIDED HISTORY: R. and left neck edema, breast mass, evaluate metastatic cancer TECHNOLOGIST PROVIDED HISTORY: Ordering Physician Provided Reason for Exam: LOOKING FOR METS, RT AND LT NECK EDEMA, BREAST MASS Acuity: Unknown Type of Exam: Unknown FINDINGS: PHARYNX/LARYNX: The nasopharynx, oropharynx, hypopharynx, and laryngeal structures are grossly unremarkable. There is a mild asymmetry on the right, likely secondary to below described mass. No evidence of retropharyngeal abscess. There is mild extension of above process into the lateral retropharyngeal space inferiorly. SALIVARY GLANDS/THYROID: There may be involvement of right parotid gland with infiltrating mass coming in close proximity to right submandibular gland. The left submandibular gland and left parotid gland are unremarkable. The thyroid gland appears grossly unremarkable with small nonspecific left thyroid lesions.  LYMPH NODES: There are mildly prominent nonspecific left-sided cervical lymph nodes. Limited evaluation of right cervical lymph node due to extensive infiltrating mass. SOFT TISSUES: There is an infiltrating mass within right lateral and right posterior neck extending to the midline. This mass extends superiorly to the level of the right parotid and probably involving right parotid gland. This mass comes in close proximity to the right ear. This mass extends inferiorly into the right chest wall and in the right breast.  There appears to be destruction of the right-sided ribs. No evidence of discrete soft tissue abscess. There is extensive edema involving and adjacent to the mass. There is narrowing of right internal jugular vein without evidence of occlusion. This mass surrounds the right common, right subclavian artery, and proximal aspect of the right internal carotid artery. BRAIN/ORBITS/SINUSES: There is mild-to-moderate dilatation of lateral ventricle, partially imaged. No enhancing intracranial mass is identified. This is partially evaluated. The paranasal sinuses and mastoid air cells are clear. LUNG APICES/SUPERIOR MEDIASTINUM: Infiltrating mass extends into the right anterior chest wall and right breast.  There is destruction of the anterior right rib. Small bilateral pleural effusions. Multiple enlarged mediastinal lymph nodes. Please see separate CT chest report. BONES: There are lytic lesions within C7 and T1 vertebral body. There appears to be pathologic fracture of T1 vertebral body. There may be epidural component at T1 vertebral level. There is also sternal metastasis. 1. Large infiltrating neoplasm within right aspect of the neck, extending superiorly to the level of the right parotid and inferiorly into the right anterior chest wall and right breast.  Inferior extent is not imaged on this examination. Please see CT chest report.  2. Above described mass surrounds and narrows multiple is partially visualized mediastinal adenopathy and there are bilateral pleural effusions. Lumbar spine: No acute fracture is identified. Grade 1 anterolisthesis of L4 on L5 is likely degenerative in origin. Expansile, marrow replacing lesions are seen scattered throughout the spine including dominant lesions centered on the L3 and L4 spinous processes well as the right sacral wing. The lesion within the posterior aspect of the L5 vertebral body demonstrates extra osseous extension into the anterior epidural space measuring up to 4.3 mm in AP diameter on image number 22 of series 8. A uterine mass is partially visualized, probably a fibroid. There are sacral Tarlov cysts. There is multilevel degenerative disc disease characterized by disc desiccation, disc height loss, and osteophyte formation. A disc bulge results in mild narrowing of the thecal sac and both neural foramen at L2-3. A disc bulge and facet hypertrophy results in moderate narrowing of the thecal sac and the left neural foramen at L2-3. A disc bulge and facet hypertrophy results in mild to moderate narrowing of the thecal sac and mild-to-moderate narrowing of both the neural foramen at L4-5. 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      Mri Lumbar Spine W Wo Contrast    Result Date: 7/15/2018  EXAMINATION: MRI OF THE THORACIC SPINE WITHOUT AND WITH CONTRAST; MRI OF THE LUMBAR SPINE WITHOUT AND WITH CONTRAST  7/15/2018 7:40 pm; 7/15/2018 7:43 pm TECHNIQUE: Multiplanar multisequence MRI of the thoracic spine was performed without and with the administration of intravenous contrast.; Multiplanar multisequence MRI of the lumbar spine was performed without and with the administration of intravenous contrast. COMPARISON: None HISTORY: ORDERING SYSTEM PROVIDED HISTORY: METASTASES TO SPINE TECHNOLOGIST PROVIDED HISTORY: Ordering Physician Provided Reason for Exam: mets to spine Additional signs and symptoms: history of breast cancer per patient FINDINGS: Thoracic spine: No acute fracture or traumatic subluxation is identified. There is mild to moderate multilevel degenerative disc disease characterized by disc desiccation, disc height loss, and osteophyte formation. There is a 6 mm left paracentral disc protrusion at T6-T7 and a 3 mm central disc protrusion at T7-T8. The thoracic cord is normal in size and signal intensity. Marrow replacing lesions are scattered throughout the thoracic spine including dominant lesions centered on the spinous processes of T3 and T4. No epidural collection or mass is identified. There is partially visualized mediastinal adenopathy and there are bilateral pleural effusions. Lumbar spine: No acute fracture is identified. Grade 1 anterolisthesis of L4 on L5 is likely degenerative in origin. Expansile, marrow replacing lesions are seen scattered throughout the spine including dominant lesions centered on the L3 and L4 spinous processes well as the right sacral wing. The lesion within the posterior aspect of the L5 vertebral body demonstrates extra osseous extension into the anterior epidural space measuring up to 4.3 mm in AP diameter on image number 22 of series 8. A uterine mass is partially visualized, probably a fibroid. There are sacral Tarlov cysts. There is multilevel degenerative disc disease characterized by disc desiccation, disc height loss, and osteophyte formation. A disc bulge results in mild narrowing of the thecal sac and both neural foramen at L2-3. A disc bulge and facet hypertrophy results in moderate narrowing of the thecal sac and the left neural foramen at L2-3. A disc bulge and facet hypertrophy results in mild to moderate narrowing of the thecal sac and mild-to-moderate narrowing of both the neural foramen at L4-5. 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      Ct Abdomen Pelvis W Iv Contrast    Result Date: 7/15/2018  EXAMINATION: CT OF THE ABDOMEN AND ill-defined breast mass is seen involving the left breast, seen the majority of the left breast with partially calcified enlarged left axillary lymphadenopathy. There is associated skin thickening noted. There appears to be extension of this mass into the chest wall with loss of the pectoralis contours. There also appears to be prominent to enlarged left axillary lymph nodes largest measuring 1.4 cm in short axis. There is associated rib destruction involving the anterior aspect of the right 4th rib. Additional expansile lytic lesions are identified involving the right 5th, 8th and 10th ribs as well as the left 4th through 6th ribs. Additional lytic lesions are identified involving the T1, T12 and L1 vertebral bodies as well as the body of the sternum. Additional lytic lesions are also identified involving the visualized portions of the proximal humeri. In addition to the findings above, there appears to be soft tissue infiltration involving the right aspect of the chest extending into the thoracic inlet and along the anterior aspect of the inferior aspect of the neck which appears to extend into the superior mediastinum. The right subclavian vein is not clearly identified. Abdomen/Pelvis: Organs: No enhancing liver lesion is noted. Portal vein, gallbladder, spleen, pancreas and adrenal glands all appear unremarkable. Kidneys appear unremarkable. Abdominal aorta appears normal in caliber. GI/Bowel: Stomach is grossly unremarkable. Small bowel appears nondilated. No acute colonic abnormality is seen. Appendix not clearly identified. Pelvis: Fibroid uterus is seen with a pedunculated fibroid which is partially calcified. No adnexal mass or cyst.  Urinary bladder is grossly unremarkable. Peritoneum/Retroperitoneum: No free air or free fluid. No lymphadenopathy. Bones/Soft Tissues: Abdominal wall demonstrates body wall anasarca.   Lytic lesions are identified throughout the lumbar spine and bony pelvis and addition to the findings above, there appears to be soft tissue infiltration involving the right aspect of the chest extending into the thoracic inlet and along the anterior aspect of the inferior aspect of the neck which appears to extend into the superior mediastinum. The right subclavian vein is not clearly identified. Abdomen/Pelvis: Organs: No enhancing liver lesion is noted. Portal vein, gallbladder, spleen, pancreas and adrenal glands all appear unremarkable. Kidneys appear unremarkable. Abdominal aorta appears normal in caliber. GI/Bowel: Stomach is grossly unremarkable. Small bowel appears nondilated. No acute colonic abnormality is seen. Appendix not clearly identified. Pelvis: Fibroid uterus is seen with a pedunculated fibroid which is partially calcified. No adnexal mass or cyst.  Urinary bladder is grossly unremarkable. Peritoneum/Retroperitoneum: No free air or free fluid. No lymphadenopathy. Bones/Soft Tissues: Abdominal wall demonstrates body wall anasarca. Lytic lesions are identified throughout the lumbar spine and bony pelvis and hips with associated cortical destruction largest seen along the right iliac wing measuring 4.0 x 3.4 cm. No pathological fractures are noted. *Large ill-defined right breast mass with associated skin thickening extension into the chest wall. There is associated metastatic lymphadenopathy involving the axillary regions as well as the mediastinum. In addition, there appears to be soft tissue infiltration involving the right chest extending into the thoracic inlet and neck also suggestive of metastatic disease with the right subclavian vein not clearly visualized. 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 No pathological fractures are noted. *Moderate bilateral pleural effusions possibly metastatic. *Pedunculated partially calcified fibroid uterus.   Given the above findings, dedicated pelvic ultrasound is recommended to confirm findings. *No definitive CT evidence of intra-abdominal metastatic disease. Mri Femur Left Wo Contrast    Result Date: 7/16/2018  EXAMINATION: MRI OF THE RIGHT FEMUR WITHOUT CONTRAST; MRI OF THE LEFT FEMUR WITHOUT CONTRAST, 7/16/2018 9:02 pm TECHNIQUE: Multiplanar multisequence MRI of the right femur was performed without the administration of intravenous contrast.; Multiplanar multisequence MRI of the left femur was performed without the administration of intravenous contrast. COMPARISON: Bilateral femur radiographs 07/15/2018. HISTORY: ORDERING SYSTEM PROVIDED HISTORY: right femur metastasis TECHNOLOGIST PROVIDED HISTORY: Ordering Physician Provided Reason for Exam: right femur metastasis FINDINGS: SOFT TISSUES: Mild likely reactive edema in the bilateral proximal adductor muscles and left quadriceps musculature. No abnormal soft tissue mass or drainable fluid collection. The visualized tendons are grossly intact. BONE MARROW: There are scattered metastatic lesions throughout the bilateral femora. The largest lesion in the right femur involves the entire intertrochanteric portion and extends into the lower aspect of the femoral neck and proximal diaphysis. Smaller scattered metastatic lesions throughout the right femur. A large metastatic lesion is also seen in the left intertrochanteric femur and lower femoral neck. A large metastatic lesion in the distal left femoral shaft demonstrates anterior cortical thinning and penetration into the adjacent soft tissues. There is mild periosteal edema. Metastatic lesions also seen in the bilateral ischial tuberosities. Large metastatic lesion in the left medial femoral condyles. No definite fracture. No dislocation. 1. Diffusely scattered metastatic lesions versus myeloma throughout the bilateral femora and also involving the bilateral ischial tuberosities.  2. Large lesions in the bilateral intertrochanteric femora and lower femoral necks place the patient at increased risk for pathologic fracture. 3. Large lesion in the distal left femoral shaft with anterior cortical thinning and penetration into the adjacent soft tissues. This also places the patient at high risk for pathologic fracture. Fluoro For Surgical Procedures    Result Date: 7/17/2018  Radiology exam is complete. No Radiologist dictation. Please follow up with ordering provider. Fluoro For Surgical Procedures    Result Date: 7/17/2018  Radiology exam is complete. No Radiologist dictation. Please follow up with ordering provider. Fluoro For Surgical Procedures    Result Date: 7/16/2018  EXAMINATION: SPOT FLUOROSCOPIC IMAGES 7/16/2018 1:45 pm; 7/16/2018 1:48 pm TECHNIQUE: Fluoroscopy was provided by the radiology department for procedure. Radiologist was not present during examination. FLUOROSCOPY DOSE AND TYPE OR TIME AND EXPOSURES: 527.7 seconds. 28.67 rad COMPARISON: None HISTORY: Intraprocedural imaging. FINDINGS: Twenty one spot images of the spine were obtained. Limited intraoperative fluoroscopic images demonstrate instruments and cement related to kyphoplasty performed at several levels. The exact levels are indeterminate based on the provided views. Sacral plasty also appears to have been performed. Please refer to the operative report for detailed description. Intraprocedural fluoroscopic spot images as above. See separate procedure report for more information. Mri Femur Right Wo Contrast    Result Date: 7/16/2018  EXAMINATION: MRI OF THE RIGHT FEMUR WITHOUT CONTRAST; MRI OF THE LEFT FEMUR WITHOUT CONTRAST, 7/16/2018 9:02 pm TECHNIQUE: Multiplanar multisequence MRI of the right femur was performed without the administration of intravenous contrast.; Multiplanar multisequence MRI of the left femur was performed without the administration of intravenous contrast. COMPARISON: Bilateral femur radiographs 07/15/2018.  HISTORY: ORDERING SYSTEM PROVIDED

## 2018-07-19 PROBLEM — E44.1 MILD MALNUTRITION (HCC): Status: ACTIVE | Noted: 2018-01-01

## 2018-07-19 NOTE — PROGRESS NOTES
.. PALLIATIVE CARE NURSING ASSESSMENT    Patient: Hosea Sanchez  Room: 2065/2065-01    Reason For Consult   Goals of care evaluation  Distress management  Guidance and support  Facilitate communications  Assistance in coordinating care      Impression: Hosea Sanchez is a 81y.o. year old female  has a past medical history of Breast cancer ([facility] Essential hypertension; and [facility] .  Currently hospitalized for the management of metastatic breast cancer. The Palliative Care Team is following to assist with goals of care. Vital Signs  Blood pressure (!) 149/78, pulse 107, temperature 98.1 °F (36.7 °C), temperature source Oral, resp. rate 20, height 5' 6\" (1.676 m), weight 137 lb 5.6 oz (62.3 kg), SpO2 95 %. Patient Active Problem List   Diagnosis    Acute pain of left knee    Essential hypertension    Dizziness    Nonrheumatic aortic valve insufficiency    Orthostatic hypotension    Metastatic breast cancer (HCC)    Closed fracture of first thoracic vertebra ([facility]    Pathological fracture of vertebra due to neoplastic disease    Closed wedge compression fracture of fifth lumbar vertebra (HCC)    Pathological fracture of vertebra due to neoplastic disease with routine healing    Pathological fracture of sacral vertebra    Pathological fracture of thoracic vertebra with routine healing    Metastasis to bone (HCC)       Palliative Interaction: Pt sitting up in the chair, states she feels good. Denies pain at present. Plan is to go to [facility] for rehab. She feels she does not need palliative care at this time to follow at [facility] as she does not have much pain or symptoms. Pt will be started on cancer treatment/hormone therapy while she is here. Will involve palliative care once she becomes symptomatic. Pt agreeable and has a [employer] brochure and will call when that time comes. Emotional support offered. Will follow as needed.     Goals/Plan of care  Education/support to patient  Discharge planning/helping to coordinate care  Communications with primary service  Providing support for coping/adaptation/distress of patient  Discussing meaning/purpose   Continue with current plan of care  Clarification of medical condition to patient and family  Code status clarified: Full Code  Pt to go to [facility] for rehab at discharge. Pt will call palliative care if she becomes symptomatic in the future. Denies need for it now. Support offered. Will follow as needed.       Palliative Care Coordinator  Esa Pinto RN, [address] [place] [address] Office: [phone] Office: [phone]  Work Cell Phone: [phone]    For [facility] scheduling please call [phone]

## 2018-07-19 NOTE — PROGRESS NOTES
100 mg Oral BID    sodium chloride flush  10 mL Intravenous 2 times per day    enoxaparin  40 mg Subcutaneous Daily    amLODIPine  10 mg Oral Daily    dexamethasone  4 mg Oral 4 times per day     Continuous Infusions:    lactated ringers 75 mL/hr at 18 0941     PRN Meds: sodium chloride flush, acetaminophen, [MAR Hold] potassium chloride **OR** [MAR Hold] potassium chloride **OR** [MAR Hold] potassium chloride, sodium chloride flush, magnesium hydroxide, ondansetron, HYDROcodone 5 mg - acetaminophen, HYDROcodone 5 mg - acetaminophen, LORazepam, [MAR Hold] fentanNYL, fentanNYL, fentanNYL, sodium chloride flush    Data:     Past Medical History:   has a past medical history of Breast cancer ([facility] [address] Essential hypertension; and Metastatic breast cancer ([facility] [address] Social History:   reports that she has never smoked. She has never used smokeless tobacco. She reports that she drinks alcohol. She reports that she does not use drugs. Family History:   Family History   Problem Relation Age of Onset    Uterine Cancer Mother 48    Prostate Cancer Father 79    Cancer Maternal Grandmother         pancreas    Colon Cancer Maternal Grandfather [de-identified]       Vitals:  BP (!) 148/71   Pulse 93   Temp 98.4 °F (36.9 °C) (Oral)   Resp 16   Ht 5' 6\" (1.676 m)   Wt 137 lb 5.6 oz (62.3 kg)   SpO2 91%   BMI 22.17 kg/m²   Temp (24hrs), Av.1 °F (36.7 °C), Min:97.7 °F (36.5 °C), Max:98.4 °F (36.9 °C)    Recent Labs      18   1138  18   1619  18   0737   POCGLU  151*  117*  120*  102       I/O (24Hr):     Intake/Output Summary (Last 24 hours) at 18 0852  Last data filed at 18 1617   Gross per 24 hour   Intake             1250 ml   Output             2850 ml   Net            -1600 ml       Labs:    [unfilled]    No results found for: SPECIAL  No results found for: CULTURE    [unfilled]    Radiology:    Xr Lumbar Spine (2-3 Views)    Result Date: 2018  EXAMINATION: SPOT Subtle lucency along the distal femoral metaphysis is suspicious for metastatic disease. No joint effusion. Left lower extremity:  Subtle lucency is seen within the mid femur. Irregularity is seen along the distal femur. Lucency is seen within the proximal femur. Cortical breakthrough is seen involving the greater trochanter. Pelvis:  Subtle lucencies throughout the pelvis are suspicious for osseous metastatic disease. Diffuse osseous metastatic disease. Findings may be related to known breast cancer. Multiple myeloma can have a similar appearance. Xr Humerus Left (min 2 Views)    Result Date: 7/15/2018  EXAMINATION: TWO XRAY VIEWS OF THE LEFT HUMERUS; TWO XRAY VIEWS OF THE RIGHT HUMERUS; SINGLE XRAY VIEW OF THE PELVIS; FOUR XRAY VIEWS OF THE RIGHT FEMUR; FOUR XRAY VIEWS OF THE LEFT FEMUR; 2 XRAY VIEWS OF THE LEFT KNEE 7/15/2018 7:56 pm COMPARISON: None. HISTORY: ORDERING SYSTEM PROVIDED HISTORY: lesions seen on CT chest TECHNOLOGIST PROVIDED HISTORY: Reason for exam:->lesions seen on CT chest Ordering Physician Provided Reason for Exam: pain Acuity: Unknown Type of Exam: Unknown Relevant Medical/Surgical History: mets FINDINGS: Metastatic survey was performed. Left humerus:  Lucent lesion is seen along the medial aspect of the left proximal humerus. Slightly heterogeneous appearance of the left femoral head is seen on the lateral view. Right humerus:  Subtle cortical irregularity is seen along the right mid humerus. Right lower extremity:  Subtle heterogeneous appearance of the right ischial tuberosity. Suggestion of a lucent lesion is seen within the right proximal femur. Subtle lucencies are seen within the mid to distal femur. Degenerative changes are seen within the right knee. Subtle lucency along the distal femoral metaphysis is suspicious for metastatic disease. No joint effusion. Left lower extremity:  Subtle lucency is seen within the mid femur. Irregularity is seen along the distal femur. Lucency is seen within the proximal femur. Cortical breakthrough is seen involving the greater trochanter. Pelvis:  Subtle lucencies throughout the pelvis are suspicious for osseous metastatic disease. Diffuse osseous metastatic disease. Findings may be related to known breast cancer. Multiple myeloma can have a similar appearance. Xr Humerus Right (min 2 Views)    Result Date: 7/15/2018  EXAMINATION: TWO XRAY VIEWS OF THE LEFT HUMERUS; TWO XRAY VIEWS OF THE RIGHT HUMERUS; SINGLE XRAY VIEW OF THE PELVIS; FOUR XRAY VIEWS OF THE RIGHT FEMUR; FOUR XRAY VIEWS OF THE LEFT FEMUR; 2 XRAY VIEWS OF THE LEFT KNEE 7/15/2018 7:56 pm COMPARISON: None. HISTORY: ORDERING SYSTEM PROVIDED HISTORY: lesions seen on CT chest TECHNOLOGIST PROVIDED HISTORY: Reason for exam:->lesions seen on CT chest Ordering Physician Provided Reason for Exam: pain Acuity: Unknown Type of Exam: Unknown Relevant Medical/Surgical History: mets FINDINGS: Metastatic survey was performed. Left humerus:  Lucent lesion is seen along the medial aspect of the left proximal humerus. Slightly heterogeneous appearance of the left femoral head is seen on the lateral view. Right humerus:  Subtle cortical irregularity is seen along the right mid humerus. Right lower extremity:  Subtle heterogeneous appearance of the right ischial tuberosity. Suggestion of a lucent lesion is seen within the right proximal femur. Subtle lucencies are seen within the mid to distal femur. Degenerative changes are seen within the right knee. Subtle lucency along the distal femoral metaphysis is suspicious for metastatic disease. No joint effusion. Left lower extremity:  Subtle lucency is seen within the mid femur. Irregularity is seen along the distal femur. Lucency is seen within the proximal femur. Cortical breakthrough is seen involving the greater trochanter. Pelvis:  Subtle lucencies throughout the pelvis are suspicious for osseous metastatic disease.      Diffuse TWO XRAY VIEWS OF THE RIGHT HUMERUS; SINGLE XRAY VIEW OF THE PELVIS; FOUR XRAY VIEWS OF THE RIGHT FEMUR; FOUR XRAY VIEWS OF THE LEFT FEMUR; 2 XRAY VIEWS OF THE LEFT KNEE 7/15/2018 7:56 pm COMPARISON: None. HISTORY: ORDERING SYSTEM PROVIDED HISTORY: lesions seen on CT chest TECHNOLOGIST PROVIDED HISTORY: Reason for exam:->lesions seen on CT chest Ordering Physician Provided Reason for Exam: pain Acuity: Unknown Type of Exam: Unknown Relevant Medical/Surgical History: mets FINDINGS: Metastatic survey was performed. Left humerus:  Lucent lesion is seen along the medial aspect of the left proximal humerus. Slightly heterogeneous appearance of the left femoral head is seen on the lateral view. Right humerus:  Subtle cortical irregularity is seen along the right mid humerus. Right lower extremity:  Subtle heterogeneous appearance of the right ischial tuberosity. Suggestion of a lucent lesion is seen within the right proximal femur. Subtle lucencies are seen within the mid to distal femur. Degenerative changes are seen within the right knee. Subtle lucency along the distal femoral metaphysis is suspicious for metastatic disease. No joint effusion. Left lower extremity:  Subtle lucency is seen within the mid femur. Irregularity is seen along the distal femur. Lucency is seen within the proximal femur. Cortical breakthrough is seen involving the greater trochanter. Pelvis:  Subtle lucencies throughout the pelvis are suspicious for osseous metastatic disease. Diffuse osseous metastatic disease. Findings may be related to known breast cancer. Multiple myeloma can have a similar appearance. Xr Femur Right (min 2 Views)    Result Date: 7/17/2018  EXAMINATION: 17 July 2018 at 1615 hours XRAY VIEWS OF THE RIGHT FEMUR 7/17/2018 9:39 pm COMPARISON: None.  HISTORY: ORDERING SYSTEM PROVIDED HISTORY: sp right femur IMN TECHNOLOGIST PROVIDED HISTORY: Reason for exam:->sp right femur IMN Ordering Physician Provided Reason femur. Degenerative changes are seen within the right knee. Subtle lucency along the distal femoral metaphysis is suspicious for metastatic disease. No joint effusion. Left lower extremity:  Subtle lucency is seen within the mid femur. Irregularity is seen along the distal femur. Lucency is seen within the proximal femur. Cortical breakthrough is seen involving the greater trochanter. Pelvis:  Subtle lucencies throughout the pelvis are suspicious for osseous metastatic disease. Diffuse osseous metastatic disease. Findings may be related to known breast cancer. Multiple myeloma can have a similar appearance. Xr Knee Left (1-2 Views)    Result Date: 7/15/2018  EXAMINATION: TWO XRAY VIEWS OF THE LEFT HUMERUS; TWO XRAY VIEWS OF THE RIGHT HUMERUS; SINGLE XRAY VIEW OF THE PELVIS; FOUR XRAY VIEWS OF THE RIGHT FEMUR; FOUR XRAY VIEWS OF THE LEFT FEMUR; 2 XRAY VIEWS OF THE LEFT KNEE 7/15/2018 7:56 pm COMPARISON: None. HISTORY: ORDERING SYSTEM PROVIDED HISTORY: lesions seen on CT chest TECHNOLOGIST PROVIDED HISTORY: Reason for exam:->lesions seen on CT chest Ordering Physician Provided Reason for Exam: pain Acuity: Unknown Type of Exam: Unknown Relevant Medical/Surgical History: mets FINDINGS: Metastatic survey was performed. Left humerus:  Lucent lesion is seen along the medial aspect of the left proximal humerus. Slightly heterogeneous appearance of the left femoral head is seen on the lateral view. Right humerus:  Subtle cortical irregularity is seen along the right mid humerus. Right lower extremity:  Subtle heterogeneous appearance of the right ischial tuberosity. Suggestion of a lucent lesion is seen within the right proximal femur. Subtle lucencies are seen within the mid to distal femur. Degenerative changes are seen within the right knee. Subtle lucency along the distal femoral metaphysis is suspicious for metastatic disease. No joint effusion.  Left lower extremity:  Subtle lucency is seen within the mid femur. Irregularity is seen along the distal femur. Lucency is seen within the proximal femur. Cortical breakthrough is seen involving the greater trochanter. Pelvis:  Subtle lucencies throughout the pelvis are suspicious for osseous metastatic disease. Diffuse osseous metastatic disease. Findings may be related to known breast cancer. Multiple myeloma can have a similar appearance. Xr Knee Right (1-2 Views)    Result Date: 7/15/2018  EXAMINATION: 2 XRAY VIEWS OF THE RIGHT KNEE 7/15/2018 7:56 pm COMPARISON: None. HISTORY: ORDERING SYSTEM PROVIDED HISTORY: mets TECHNOLOGIST PROVIDED HISTORY: Reason for exam:->mets Ordering Physician Provided Reason for Exam: mets/pain Acuity: Unknown Type of Exam: Unknown FINDINGS: Metastatic survey was performed. Left humerus:  Lucent lesion is seen along the medial aspect of the left proximal humerus. Slightly heterogeneous appearance of the left femoral head is seen on the lateral view. Right humerus:  Subtle cortical irregularity is seen along the right mid humerus. Right lower extremity:  Subtle heterogeneous appearance of the right ischial tuberosity. Suggestion of a lucent lesion is seen within the right proximal femur. Subtle lucencies are seen within the mid to distal femur. Degenerative changes are seen within the right knee. Subtle lucency along the distal femoral metaphysis is suspicious for metastatic disease. No joint effusion. Left lower extremity:  Subtle lucency is seen within the mid femur. Irregularity is seen along the distal femur. Lucency is seen within the proximal femur. Cortical breakthrough is seen involving the greater trochanter. Pelvis:  Subtle lucencies throughout the pelvis are suspicious for osseous metastatic disease. Diffuse osseous metastatic disease. Findings may be related to known breast cancer. Multiple myeloma can have a similar appearance.      Ct Head Wo Contrast    Result Date: 7/15/2018  EXAMINATION: CT OF THE HEAD or contrast, the most sensitive exam would be an MR brain with contrast to evaluate for intracranial metastasis. 3. No clear evidence for acute intracranial hemorrhage or midline shift. 4. Mild to moderate chronic small vessel ischemic white matter disease. Atherosclerotic calcification of the vasculature. Mild atrophy. 5. Edema, soft tissue thickening, induration of the subcutaneous fat, skin, and musculature of the right suboccipital neck/soft tissues, the cause of which is unclear. This could be related to malignancy, recent intervention, or infection depending upon the clinical setting. Ct Soft Tissue Neck W Contrast    Result Date: 7/17/2018  EXAMINATION: CT OF THE NECK SOFT TISSUE WITH CONTRAST  7/15/2018 TECHNIQUE: CT of the neck was performed with the administration of intravenous contrast. Multiplanar reformatted images are provided for review. Dose modulation, iterative reconstruction, and/or weight based adjustment of the mA/kV was utilized to reduce the radiation dose to as low as reasonably achievable. COMPARISON: None. HISTORY: ORDERING SYSTEM PROVIDED HISTORY: R. and left neck edema, breast mass, evaluate metastatic cancer TECHNOLOGIST PROVIDED HISTORY: Ordering Physician Provided Reason for Exam: LOOKING FOR METS, RT AND LT NECK EDEMA, BREAST MASS Acuity: Unknown Type of Exam: Unknown FINDINGS: PHARYNX/LARYNX: The nasopharynx, oropharynx, hypopharynx, and laryngeal structures are grossly unremarkable. There is a mild asymmetry on the right, likely secondary to below described mass. No evidence of retropharyngeal abscess. There is mild extension of above process into the lateral retropharyngeal space inferiorly. SALIVARY GLANDS/THYROID: There may be involvement of right parotid gland with infiltrating mass coming in close proximity to right submandibular gland. The left submandibular gland and left parotid gland are unremarkable.   The thyroid gland appears grossly unremarkable with small collection or mass is identified. There is partially visualized mediastinal adenopathy and there are bilateral pleural effusions. Lumbar spine: No acute fracture is identified. Grade 1 anterolisthesis of L4 on L5 is likely degenerative in origin. Expansile, marrow replacing lesions are seen scattered throughout the spine including dominant lesions centered on the L3 and L4 spinous processes well as the right sacral wing. The lesion within the posterior aspect of the L5 vertebral body demonstrates extra osseous extension into the anterior epidural space measuring up to 4.3 mm in AP diameter on image number 22 of series 8. A uterine mass is partially visualized, probably a fibroid. There are sacral Tarlov cysts. There is multilevel degenerative disc disease characterized by disc desiccation, disc height loss, and osteophyte formation. A disc bulge results in mild narrowing of the thecal sac and both neural foramen at L2-3. A disc bulge and facet hypertrophy results in moderate narrowing of the thecal sac and the left neural foramen at L2-3. A disc bulge and facet hypertrophy results in mild to moderate narrowing of the thecal sac and mild-to-moderate narrowing of both the neural foramen at L4-5. 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      Mri Lumbar Spine W Wo Contrast    Result Date: 7/15/2018  EXAMINATION: MRI OF THE THORACIC SPINE WITHOUT AND WITH CONTRAST; MRI OF THE LUMBAR SPINE WITHOUT AND WITH CONTRAST  7/15/2018 7:40 pm; 7/15/2018 7:43 pm TECHNIQUE: Multiplanar multisequence MRI of the thoracic spine was performed without and with the administration of intravenous contrast.; Multiplanar multisequence MRI of the lumbar spine was performed without and with the administration of intravenous contrast. COMPARISON: None HISTORY: ORDERING SYSTEM PROVIDED HISTORY: METASTASES TO SPINE TECHNOLOGIST PROVIDED HISTORY: Ordering Physician Provided Reason for Exam: mets to spine Additional are noted. Soft Tissues/Bones: A large ill-defined breast mass is seen involving the left breast, seen the majority of the left breast with partially calcified enlarged left axillary lymphadenopathy. There is associated skin thickening noted. There appears to be extension of this mass into the chest wall with loss of the pectoralis contours. There also appears to be prominent to enlarged left axillary lymph nodes largest measuring 1.4 cm in short axis. There is associated rib destruction involving the anterior aspect of the right 4th rib. Additional expansile lytic lesions are identified involving the right 5th, 8th and 10th ribs as well as the left 4th through 6th ribs. Additional lytic lesions are identified involving the T1, T12 and L1 vertebral bodies as well as the body of the sternum. Additional lytic lesions are also identified involving the visualized portions of the proximal humeri. In addition to the findings above, there appears to be soft tissue infiltration involving the right aspect of the chest extending into the thoracic inlet and along the anterior aspect of the inferior aspect of the neck which appears to extend into the superior mediastinum. The right subclavian vein is not clearly identified. Abdomen/Pelvis: Organs: No enhancing liver lesion is noted. Portal vein, gallbladder, spleen, pancreas and adrenal glands all appear unremarkable. Kidneys appear unremarkable. Abdominal aorta appears normal in caliber. GI/Bowel: Stomach is grossly unremarkable. Small bowel appears nondilated. No acute colonic abnormality is seen. Appendix not clearly identified. Pelvis: Fibroid uterus is seen with a pedunculated fibroid which is partially calcified. No adnexal mass or cyst.  Urinary bladder is grossly unremarkable. Peritoneum/Retroperitoneum: No free air or free fluid. No lymphadenopathy. Bones/Soft Tissues: Abdominal wall demonstrates body wall anasarca.   Lytic lesions are identified throughout portions of the proximal humeri. In addition to the findings above, there appears to be soft tissue infiltration involving the right aspect of the chest extending into the thoracic inlet and along the anterior aspect of the inferior aspect of the neck which appears to extend into the superior mediastinum. The right subclavian vein is not clearly identified. Abdomen/Pelvis: Organs: No enhancing liver lesion is noted. Portal vein, gallbladder, spleen, pancreas and adrenal glands all appear unremarkable. Kidneys appear unremarkable. Abdominal aorta appears normal in caliber. GI/Bowel: Stomach is grossly unremarkable. Small bowel appears nondilated. No acute colonic abnormality is seen. Appendix not clearly identified. Pelvis: Fibroid uterus is seen with a pedunculated fibroid which is partially calcified. No adnexal mass or cyst.  Urinary bladder is grossly unremarkable. Peritoneum/Retroperitoneum: No free air or free fluid. No lymphadenopathy. Bones/Soft Tissues: Abdominal wall demonstrates body wall anasarca. Lytic lesions are identified throughout the lumbar spine and bony pelvis and hips with associated cortical destruction largest seen along the right iliac wing measuring 4.0 x 3.4 cm. No pathological fractures are noted. *Large ill-defined right breast mass with associated skin thickening extension into the chest wall. There is associated metastatic lymphadenopathy involving the axillary regions as well as the mediastinum. In addition, there appears to be soft tissue infiltration involving the right chest extending into the thoracic inlet and neck also suggestive of metastatic disease with the right subclavian vein not clearly visualized. 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 No pathological fractures are noted. *Moderate bilateral pleural effusions possibly metastatic. *Pedunculated partially calcified fibroid uterus.   Given the above intertrochanteric femora and lower femoral necks place the patient at increased risk for pathologic fracture. 3. Large lesion in the distal left femoral shaft with anterior cortical thinning and penetration into the adjacent soft tissues. This also places the patient at high risk for pathologic fracture. Fluoro For Surgical Procedures    Result Date: 7/17/2018  Radiology exam is complete. No Radiologist dictation. Please follow up with ordering provider. Fluoro For Surgical Procedures    Result Date: 7/17/2018  Radiology exam is complete. No Radiologist dictation. Please follow up with ordering provider. Fluoro For Surgical Procedures    Result Date: 7/16/2018  EXAMINATION: SPOT FLUOROSCOPIC IMAGES 7/16/2018 1:45 pm; 7/16/2018 1:48 pm TECHNIQUE: Fluoroscopy was provided by the radiology department for procedure. Radiologist was not present during examination. FLUOROSCOPY DOSE AND TYPE OR TIME AND EXPOSURES: 527.7 seconds. 28.67 rad COMPARISON: None HISTORY: Intraprocedural imaging. FINDINGS: Twenty one spot images of the spine were obtained. Limited intraoperative fluoroscopic images demonstrate instruments and cement related to kyphoplasty performed at several levels. The exact levels are indeterminate based on the provided views. Sacral plasty also appears to have been performed. Please refer to the operative report for detailed description. Intraprocedural fluoroscopic spot images as above. See separate procedure report for more information.      Mri Femur Right Wo Contrast    Result Date: 7/16/2018  EXAMINATION: MRI OF THE RIGHT FEMUR WITHOUT CONTRAST; MRI OF THE LEFT FEMUR WITHOUT CONTRAST, 7/16/2018 9:02 pm TECHNIQUE: Multiplanar multisequence MRI of the right femur was performed without the administration of intravenous contrast.; Multiplanar multisequence MRI of the left femur was performed without the administration of intravenous contrast. COMPARISON: Bilateral femur radiographs reactive to light. Neck: Normal range of motion. Erythema, warmth and induration extending from the right breast mass into the right neck. Cardiovascular: Normal rate, regular rhythm and normal heart sounds. Pulmonary/Chest: Effort normal and breath sounds normal. No respiratory distress. She has no wheezes. Large, ulcerating, and necrotic mass over the anterior aspect of the right chest, overlying the breast. Warmth and induration to palpation. No tenderness. Covered in bandage. Abdominal: Soft. Bowel sounds are normal. She exhibits no distension. There is no tenderness. There is no rebound and no guarding. Musculoskeletal: Normal range of motion. She exhibits edema. She exhibits no tenderness or deformity. Neurological: She is alert and oriented to person, place, and time. Skin: Skin is warm and dry. She is not diaphoretic.   4 surgical incision sites, covered in dry and intact bandage. Two sites on the lateral aspect of her right thigh, two sites on the lateral aspect of left thigh. No apparent drainage at this time.    Psychiatric: Mood and affect normal.         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

## 2018-07-19 NOTE — PROGRESS NOTES
Sit: Minimal assistance  Scooting: Minimal assistance  Comment: pt reports increased ease today; holds her breath requiring VC for proper breathing tech c fair return  Balance  Sitting Balance: Contact guard assistance  Standing Balance: Minimal assistance  Standing Balance  Time: 1-2min  Sit to stand: CGA/Min1  Stand to sit: CGA  Comment: 2nd A for SBA d/t anxiety  Functional Mobility  Functional - Mobility Device: Rolling Walker  Assist Level: Minimal assistance  Functional Mobility Comments: MinAx2; increased tolerance and understanding of PWB   ADL  Feeding: Increased time to complete;Modified independent   Grooming: Stand by assistance;Setup  UE Bathing: Contact guard assistance  LE Bathing: Maximum assistance  UE Dressing: Moderate assistance  LE Dressing: Dependent/Total;Maximum assistance  Toileting: Dependent/Total  Additional Comments: Intro to use of sockaid to incease ease, I and confidence c LBD tasks. Pt able to don footies with sockaid while EOB c CGA once setup. Graded down introduction to setup d/t self limiting anxiety; will continue to introduce AE/DME in intervals. Remainder of ADLs per clinical reasoning and pt report. Transfers  Sit to stand: CGA/Min x1  Stand to sit: CGA  Type of ROM/Therapeutic Exercise  Comment: pink and green therapy sponges provided and exercises reviewed to A c edema reduction (encouraged hourly use)  Exercises  Finger Extension: x  Grasp/Release: x                  Heavy review on pursed lip breathing c fair return; handout provided and pt encouraged to practice this tonight; writer will f/u up c carryover next tx; pt desats to mid 80s requiring extensive time to recover above 90%          Assessment  Performance deficits / Impairments: Decreased functional mobility ; Decreased ADL status; Decreased strength;Decreased safe awareness;Decreased endurance  Prognosis: Fair;Good  Discharge Recommendations: [address] (Due to likely longer rehab

## 2018-07-19 NOTE — PLAN OF CARE
Problem: Nutrition  Goal: Optimal nutrition therapy  Outcome: Ongoing  Nutrition Problem: Increased nutrient needs  Intervention: Food and/or Nutrient Delivery: Continue current diet, Start ONS  Nutritional Goals: PO intakes meet greater than 75% of estimated nutrition needs

## 2018-07-19 NOTE — PROGRESS NOTES
Physical Therapy  Facility/Department: [facility] MED SURG  Daily Treatment Note  NAME: Ta Wagner  : 1945  MRN: [id number]    Date of Service: 2018    Discharge Recommendations:  (P) IP Rehab   PT Equipment Recommendations  Other: (P) TBD    Patient Diagnosis(es): The primary encounter diagnosis was Metastatic breast cancer ([facility] [address] Diagnoses of Malignant pleural effusion, SVC syndrome, and Hypokalemia were also pertinent to this visit. has a past medical history of Breast cancer ([facility] [address] Essential hypertension; and Metastatic breast cancer ([facility] [address] has a past surgical history that includes Abdomen surgery; pr removal of breast lesion (Right, 2018); pr perq vert agmntj cavity crtj uni/bi cannulj lmbr (N/A, 2018); and pr open rx femur fx+intramed hitesh (Bilateral, 2018). Restrictions  Restrictions/Precautions  Restrictions/Precautions: Weight Bearing  Lower Extremity Weight Bearing Restrictions  Right Lower Extremity Weight Bearing: Weight Bearing As Tolerated  Left Lower Extremity Weight Bearing: Partial Weight Bearing  Partial Weight Bearing Percentage Or Pounds: significant pain at knee with intial position changes   Position Activity Restriction  Spinal Precautions: No Bending  Spinal Precautions: s/p mutilpe level kyphoplasty   Other position/activity restrictions: s/p Bilatereal prophalatic Femoral IMN procedures for metastatic breast Ca  Subjective   Subjective  Subjective: Pt in bed, agreeable to therapy. General Comment  Comments: Pt stated she sat up too long yesterday, I/S pt to call nursing when she's ready to return to bed.  (Pt fearful and anxious.)  Pain Assessment  Pain Assessment: 0-10  Pain Level: 6  Pain Location: Leg  Pain Orientation: Right;Left (Thigh area.)  Pain Intervention(s): Medication (see eMar)  Oxygen Therapy  SpO2: 91 %       Orientation     Objective   Bed mobility  Supine to Sit: Minimal assistance  Scooting: Minimal assistance  Comment: Pt stated that she walking  Prognosis: (P) Good  Patient Education: (P) PT POC, GOAL, WBAR RLE, PWB L LE, mobility with RW.  REQUIRES PT FOLLOW UP: (P) Yes  Activity Tolerance  Activity Tolerance: (P) Patient limited by pain     G-Code     OutComes Score                                                    AM-PAC Score             Goals  Short term goals  Time Frame for Short term goals: (P) 5 days  Short term goal 1: (P) Pt able to perform supine <>sit at mod/max A   Short term goal 2: (P)  Pt able to perform sit to stand at mod/max A   Short term goal 3: (P) Pt able to transfer at mod/max A   Short term goal 4: (P)  Pt able to ambulate with RW dist of 20 ft x 2, min A , 2nd person SBA for safety. Short term goal 5: (P) Pt to tolerate mobility at pain < 7/10.   Patient Goals   Patient goals : (P) Move better,     Plan    Plan  Times per week: (P) BID  Safety Devices  Type of devices: (P) Call light within reach, Gait belt, Left in chair     Therapy Time   Individual Concurrent Group Co-treatment   Time In 1028/1507         Time Out 1115/1540         Minutes [facility] [facility], [facility]   Electronically signed by Tara Cole PTA on 7/19/2018 at 1:29 PM

## 2018-07-19 NOTE — PROGRESS NOTES
PROGRESS NOTE    PCP: Thomas Kelly MD  Referring Provider: No ref. provider found    IMPRESSION:   1. Branda Layer of the breast locally extensive with widespread bony metastases  2.  Generalized pain  3.  Hypertension         INTERVAL HISTORY:   Preliminary report from pathology shows that this is a micropapillary variant of adenocarcinoma the breast  Estrogen receptor 30% positive  Progestin receptor 3-5% positive  HER-2 pending    We'll start initially on femara plan to add Ibrance as an outpatient  If HER-2 is positive we will have to modify her program          PLAN:     1. Begin Femara  2. Plan Ibrance as an outpatient  3. Await final path report for her 2 assessment    No Follow-up on file. VISIT DIAGNOSIS:  The primary encounter diagnosis was Metastatic breast cancer ([facility] [address] Diagnoses of Malignant pleural effusion, SVC syndrome, and Hypokalemia were also pertinent to this visit.     PAST MEDICAL HISTORY:      Diagnosis Date    Breast cancer [facility])     neglected fungating mass    Essential hypertension 4/30/2018    Metastatic breast cancer ([facility] [address] 7/15/2018       PAST SURGICAL HISTORY:      Procedure Laterality Date    ABDOMEN SURGERY      exporatory in 80s    DC OPEN RX FEMUR FX+INTRAMED ZULEYKA Bilateral 7/17/2018    FEMUR IM NAIL ZULEYKA INSERTION performed by Luke Collet, MD at [address] [place] [address] UNI/BI CANNULJ LMBR N/A 7/16/2018    OSTEOCOOL T1, T11, T12, L5 AND SACRUM, KYPHOPLASTY T1, T11, T12, L5 AND LUMBARSACRAL VERTEBROLPLASTY SACRUM performed by Kam Rick MD at [address] [place] [address] Right 7/16/2018    BREAST BIOPSY performed by Elissa Harrison MD at [address] [place] [address]:   Current Facility-Administered Medications   Medication Dose Route Frequency Provider Last Rate Last Dose    lisinopril (PRINIVIL;ZESTRIL) tablet 10 mg  10 mg Oral Daily Siddhartha Hebert MD   10 mg at 07/19/18 0825    pantoprazole (PROTONIX) tablet 40 mg  40 mg Oral mcg  25 mcg Intravenous Q1H PRN Malou Castaneda MD   25 mcg at 07/16/18 1936    fentaNYL (SUBLIMAZE) injection 50 mcg  50 mcg Intravenous Q1H PRN Malou Castaneda MD   50 mcg at 07/17/18 0509    fentaNYL (SUBLIMAZE) injection 100 mcg  100 mcg Intravenous Q1H PRN Malou Castaneda MD   100 mcg at 07/16/18 2333    amLODIPine (NORVASC) tablet 10 mg  10 mg Oral Daily Abhishek Morrison MD   10 mg at 07/19/18 0825    dexamethasone (DECADRON) tablet 4 mg  4 mg Oral 4 times per day Ivonne Carrasco MD   4 mg at 07/19/18 2715    sodium chloride flush 0.9 % injection 10 mL  10 mL Intravenous PRN Dani Bales MD   10 mL at 07/15/18 1938       SUBJECTIVE:  Patient is known of lesion on the right breast there is extensively spread locally across her chest into her neck down into her abdominal wall. Also his evidence of multiple bone metastases. The patient has essentially become bedridden is not able to get up because of weakness and pain. She is able to move her legs a little barely [employer] [place]. It is not clear if this is because of pain or because of weakness.     Her past medical history is fairly negative, she denies heart disease strokes diabetes liver problems or kidney problems. She does have hypertension. 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 She denies any prior surgeries. She has not had a pregnancy she has not been on hormone replacement therapy. There is a negative family history of breast cancer. She is not aware of any medication allergies. Her mother had cervical cancer father had prostate cancer maternal grandmother had pancreatic cancer and paternal grandfather had colon cancer.   She is a nonsmoker has never smoked regularly, she does not use alcohol.       ROS:  General: negative for fatigue, fever or night sweats  ENT: negative for headaches, hearing change, oral lesions or visual Differential Type NOT REPORTED     Immature Granulocytes NOT REPORTED 0 %    Absolute Immature Granulocyte NOT REPORTED 0.00 - 0.30 k/uL    WBC Morphology NOT REPORTED     RBC Morphology NOT REPORTED     Platelet Estimate NOT REPORTED     Seg Neutrophils 47 36 - 66 %    Lymphocytes 49 (H) 24 - 44 %    Atypical Lymphocytes 1 %    Monocytes 3 1 - 7 %    Eosinophils % 0 0 - 4 %    Basophils 0 0 - 2 %    Segs Absolute 5.73 1.3 - 9.1 k/uL    Absolute Lymph # 5.98 (H) 1.0 - 4.8 k/uL    Atypical Lymphocytes Absolute 0.12 k/uL    Absolute Mono # 0.37 0.1 - 1.3 k/uL    Absolute Eos # 0.00 0.0 - 0.4 k/uL    Basophils # 0.00 0.0 - 0.2 k/uL    Morphology SMUDGE CELLS PRESENT    Basic Metabolic Panel w/ Reflex to MG   Result Value Ref Range    Glucose 122 (H) 70 - 99 mg/dL    BUN 7 (L) 8 - 23 mg/dL    CREATININE 0.44 (L) 0.50 - 0.90 mg/dL    Bun/Cre Ratio NOT REPORTED 9 - 20    Calcium 9.2 8.6 - 10.4 mg/dL    Sodium 133 (L) 135 - 144 mmol/L    Potassium 3.4 (L) 3.7 - 5.3 mmol/L    Chloride 95 (L) 98 - 107 mmol/L    CO2 23 20 - 31 mmol/L    Anion Gap 15 9 - 17 mmol/L    GFR Non-African American >60 >60 mL/min    GFR African American >60 >60 mL/min    GFR Comment          GFR Staging NOT REPORTED    Magnesium   Result Value Ref Range    Magnesium 1.8 1.6 - 2.6 mg/dL   Path Review, Smear   Result Value Ref Range    Pathologist Review TO BE REVIEWED BY PATHOLOGIST    CBC auto differential   Result Value Ref Range    WBC 27.9 (H) 3.5 - 11.0 k/uL    RBC 3.65 (L) 4.0 - 5.2 m/uL    Hemoglobin 10.8 (L) 12.0 - 16.0 g/dL    Hematocrit 33.1 (L) 36 - 46 %    MCV 90.6 80 - 100 fL    MCH 29.6 26 - 34 pg    MCHC 32.6 31 - 37 g/dL    RDW 15.1 (H) 11.5 - 14.9 %    Platelets 433 185 - 397 k/uL    MPV 7.3 6.0 - 12.0 fL    NRBC Automated NOT REPORTED per 100 WBC    Differential Type NOT REPORTED     Immature Granulocytes NOT REPORTED 0 %    Absolute Immature Granulocyte NOT REPORTED 0.00 - 0.30 k/uL    WBC Morphology NOT REPORTED     RBC Morphology NOT REPORTED     Platelet Estimate NOT REPORTED     Seg Neutrophils 47 36 - 66 %    Lymphocytes 49 (H) 24 - 44 %    Atypical Lymphocytes 2 %    Monocytes 2 1 - 7 %    Eosinophils % 0 0 - 4 %    Basophils 0 0 - 2 %    Segs Absolute 13.11 (H) 1.3 - 9.1 k/uL    Absolute Lymph # 13.67 (H) 1.0 - 4.8 k/uL    Atypical Lymphocytes Absolute 0.56 k/uL    Absolute Mono # 0.56 0.1 - 1.3 k/uL    Absolute Eos # 0.00 0.0 - 0.4 k/uL    Basophils # 0.00 0.0 - 0.2 k/uL    Morphology ANISOCYTOSIS PRESENT    Basic Metabolic Panel w/ Reflex to MG   Result Value Ref Range    Glucose 136 (H) 70 - 99 mg/dL    BUN 16 8 - 23 mg/dL    CREATININE 0.52 0.50 - 0.90 mg/dL    Bun/Cre Ratio NOT REPORTED 9 - 20    Calcium 9.1 8.6 - 10.4 mg/dL    Sodium 131 (L) 135 - 144 mmol/L    Potassium 4.7 3.7 - 5.3 mmol/L    Chloride 93 (L) 98 - 107 mmol/L    CO2 24 20 - 31 mmol/L    Anion Gap 14 9 - 17 mmol/L    GFR Non-African American >60 >60 mL/min    GFR African American >60 >60 mL/min    GFR Comment          GFR Staging NOT REPORTED    C-Reactive Protein   Result Value Ref Range    .0 (H) 0.0 - 5.0 mg/L   Sedimentation Rate   Result Value Ref Range    Sed Rate 60 (H) 0 - 20 mm   Urinalysis, Routine   Result Value Ref Range    Color, UA YELLOW YEL    Turbidity UA CLEAR CLEAR    Glucose, Ur NEGATIVE NEG    Bilirubin Urine NEGATIVE NEG    Ketones, Urine NEGATIVE NEG    Specific [place], UA 1.010 1.000 - 1.030    Urine Hgb NEGATIVE NEG    pH, UA 6.5 5.0 - 8.0    Protein, UA NEGATIVE NEG    Urobilinogen, Urine Normal NORM    Nitrite, Urine NEGATIVE NEG    Leukocyte Esterase, Urine TRACE (A) NEG    Urinalysis Comments NOT REPORTED    Microscopic Urinalysis   Result Value Ref Range    -          WBC, UA 2 TO 5 /HPF    RBC, UA 0 TO 2 /HPF    Casts UA NOT REPORTED /LPF    Crystals UA NOT REPORTED NONE /HPF    Epithelial Cells UA 0 TO 2 /HPF    Renal Epithelial, Urine NOT REPORTED 0 /HPF    Bacteria, UA FEW (A) NONE    Mucus, UA 1+ (A) NONE    Trichomonas, UA NOT REPORTED NONE    Amorphous, UA NOT REPORTED NONE    Other Observations UA NOT REPORTED NREQ    Yeast, UA NOT REPORTED NONE   CBC auto differential   Result Value Ref Range    WBC 36.5 (HH) 3.5 - 11.0 k/uL    RBC 3.23 (L) 4.0 - 5.2 m/uL    Hemoglobin 9.7 (L) 12.0 - 16.0 g/dL    Hematocrit 29.4 (L) 36 - 46 %    MCV 91.0 80 - 100 fL    MCH 30.0 26 - 34 pg    MCHC 33.0 31 - 37 g/dL    RDW 15.4 (H) 11.5 - 14.9 %    Platelets 401 489 - 918 k/uL    MPV 7.0 6.0 - 12.0 fL    NRBC Automated NOT REPORTED per 100 WBC    Differential Type NOT REPORTED     Immature Granulocytes NOT REPORTED 0 %    Absolute Immature Granulocyte NOT REPORTED 0.00 - 0.30 k/uL    WBC Morphology NOT REPORTED     RBC Morphology NOT REPORTED     Platelet Estimate NOT REPORTED     Seg Neutrophils 37 36 - 66 %    Lymphocytes 59 (H) 24 - 44 %    Monocytes 2 1 - 7 %    Eosinophils % 0 0 - 4 %    Basophils 0 0 - 2 %    Bands 2 0 - 10 %    Segs Absolute 13.51 (H) 1.3 - 9.1 k/uL    Absolute Lymph # 21.53 (H) 1.0 - 4.8 k/uL    Absolute Mono # 0.73 0.1 - 1.3 k/uL    Absolute Eos # 0.00 0.0 - 0.4 k/uL    Basophils # 0.00 0.0 - 0.2 k/uL    Absolute Bands # 0.73 0.0 - 1.0 k/uL    Morphology HYPOCHROMIA PRESENT    Basic Metabolic Panel w/ Reflex to MG   Result Value Ref Range    Glucose 112 (H) 70 - 99 mg/dL    BUN 13 8 - 23 mg/dL    CREATININE 0.47 (L) 0.50 - 0.90 mg/dL    Bun/Cre Ratio NOT REPORTED 9 - 20    Calcium 8.4 (L) 8.6 - 10.4 mg/dL    Sodium 128 (L) 135 - 144 mmol/L    Potassium 4.5 3.7 - 5.3 mmol/L    Chloride 91 (L) 98 - 107 mmol/L    CO2 25 20 - 31 mmol/L    Anion Gap 12 9 - 17 mmol/L    GFR Non-African American >60 >60 mL/min    GFR African American >60 >60 mL/min    GFR Comment          GFR Staging NOT REPORTED    CBC auto differential   Result Value Ref Range    WBC 41.8 (HH) 3.5 - 11.0 k/uL    RBC 2.80 (L) 4.0 - 5.2 m/uL    Hemoglobin 8.4 (L) 12.0 - 16.0 g/dL    Hematocrit 25.6 (L) 36 - 46 %    MCV 91.2 80 - 100 fL    MCH 30.0 26 - 34 pg

## 2018-07-20 NOTE — PROGRESS NOTES
Physical Therapy  DATE: 2018    NAME: Jeremiah Sanchez  MRN: [id number]   : 1945    Patient not seen this date for Physical Therapy due to:  [] Blood transfusion in progress  [] Hemodialysis  [x]  Patient Declined: Eating lunch at 6406; will try back for second of BID tx.   [] Spine Precautions   [] Strict Bedrest  [] Surgery/ Procedure  [] Testing      [] Other        [] PT being discontinued at this time. Patient independent. No further needs. [] PT being discontinued at this time as the patient has been transferred to palliative care. No further needs.     Rigoberto Ordaz, PTA

## 2018-07-20 NOTE — PROGRESS NOTES
Physical Therapy  [facility]    Physical Therapy Progress Note    Date: 18  Patient Name: Vira Terrell       Room: 2434/8468-96  MRN: [id number]   Account: [de-identified]   : 1945  (71 y.o.)   Gender: female     Discharge Recommendations   IP Rehab  Other: TBD    Referring Practitioner: Dr Cody Solares / Dr Laurie Pink / Dr Kimberly Rosales   Diagnosis: Metastatic Breast Ca -  femur mets  -  s/p IMN   Restrictions/Precautions: Weight Bearing  Spinal Precautions: s/p mutilpe level kyphoplasty   Other position/activity restrictions: s/p Bilatereal prophalatic Femoral IMN procedures for metastatic breast Ca  Right Lower Extremity Weight Bearing: Weight Bearing As Tolerated  Left Lower Extremity Weight Bearing: Partial Weight Bearing   Partial Weight Bearing Percentage Or Pounds: WB % dependent on pt pain    Past Medical History:  has a past medical history of Breast cancer ([facility] [address] Essential hypertension; and Metastatic breast cancer ([facility] [address] Past Surgical History:   has a past surgical history that includes Abdomen surgery; pr removal of breast lesion (Right, 2018); pr perq vert agmntj cavity crtj uni/bi cannulj lmbr (N/A, 2018); and pr open rx femur fx+intramed hitesh (Bilateral, 2018). Additional Pertinent Hx: 81year old female presenting with weakness, fatigue and pain in lower extremities secondary to metastatic breast cancer. Patient had surgical biopsy of breast mass on  and kyphoplasty (T1, T11, T12, L5, S1) of several vertebrae. Bilateral prophalatic Femoral IMN  on 18. Spinal Precautions: No Bending  Spinal Precautions: s/p mutilpe level kyphoplasty   Other position/activity restrictions: s/p Bilatereal prophalatic Femoral IMN procedures for metastatic breast Ca    Subjective: Pt in bed, agreeable to therapy. Comments: DARIO man, notified of pt request for pain medication prior to transfer to [facility].  Pt talkative, anxious; multiple cues and instructions on breathing techniques to relieve anxiety, lower HR, not hold breath. (Pt fearful and anxious.)    Vital Signs  Pulse: 110 (Highest reading during tx)  Patient Currently in Pain: Yes  Pain Assessment: Faces (Pt unwilling/unable to quantify)  Neely-Matias Pain Rating: Hurts little more  Pain Type: Surgical pain     Oxygen Therapy  SpO2: 90 % (lowest reading during tx)  Patient Observation  Observations: Pt lying in bed on entry, sitting in bedside recliner on exit with instructions to call nursing when ready to move back to bed; also left this PTA's extension to call if she feels she'd been sitting too long. Bed Mobility  Supine to Sit: Minimal assistance; Moderate assistance (Min + Mod)  Scooting: Moderate assistance (hips to EOB)  Comment: HOB elevated about 40*, rail, 1 pillow    Transfers:  Sit to Stand: Contact guard assistance (x 2 for safety/relieve anxiety)  Stand to sit: Contact guard assistance (x 2 for safety/relieve anxiety)   Comment: RW     WB Status: WBAT/FWB R LE, PWB L LE  Ambulation 1  Surface: level tile  Device: Rolling Walker  Assistance: Contact guard assistance (x 1 + SB/chair follow for safey)  Quality of Gait: Small steps, initial cues for direction w/RW & sequencing, to maintain breathing. Distance: 9' F + 2.5 steps R  Comments: Pt worked very hard w/relaxation techs and proper breathing patterns. Stairs/Curb  Stairs?: No     Posture: Good  Sitting - Static: Fair (EOB: no back support, seeks UE support most of the time)  Sitting - Dynamic: Fair (EOB: no back support, seeks UE support most of the time)  Standing - Static: Fair;+ (RW)  Standing - Dynamic: Fair (RW)     Other exercises?: Yes  Other Activities  Other Activities: Dangling protocol     PT Equipment Recommendations  Other: TBD     Assessment  Activity Tolerance: Patient limited by pain; Patient Tolerated treatment well   Body structures, Functions, Activity limitations: Decreased functional mobility ; Decreased ROM; Decreased

## 2018-07-20 NOTE — PROGRESS NOTES
FROM SPINE (CLINICALLY FROM KYPHOPLASTY T1, T11,   T12, L5 AND SACRUM):         FRAGMENTS OF METASTATIC CARCINOMA     INVOLVEMENT BY CHRONIC LYMPHOCYTIC LEUKEMIA/SMALL CELL LYMPHOMA     ASSESSMENT   81 y.o. female Patient Active Hospital Problem List:    Patient Active Problem List   Diagnosis    Acute pain of left knee    Essential hypertension    Dizziness    Nonrheumatic aortic valve insufficiency    Orthostatic hypotension    Metastatic breast cancer (HCC)    Closed fracture of first thoracic vertebra ([address]    Pathological fracture of vertebra due to neoplastic disease    Closed wedge compression fracture of fifth lumbar vertebra (HCC)    Pathological fracture of vertebra due to neoplastic disease with routine healing    Pathological fracture of sacral vertebra    Pathological fracture of thoracic vertebra with routine healing    Metastasis to bone (HCC)    Mild malnutrition (HCC)         Plan  1. GI prophylaxis proton pump inhibitor per orders, pharmacologic prophylaxis (with any of the following: enoxaparin (Lovenox) 40mg SQ 2h prior to surgery then QD)  2. normal diet as tolerated      Electronically signed by Kori Patino MD  on 7/20/2018 at 2:50 PM

## 2018-07-20 NOTE — PLAN OF CARE
Problem: Falls - Risk of:  Goal: Will remain free from falls  Will remain free from falls   Outcome: Ongoing  Pt. Free of falls and injuries this shift. Problem: Pain:  Goal: Pain level will decrease  Pain level will decrease   Outcome: Ongoing  Adequate pain control achieved this shift. See MAR.

## 2018-07-20 NOTE — PROGRESS NOTES
negative for bleeding problems, blood clots, bruising, fatigue, night sweats or weight loss  Respiratory: negative for cough, shortness of breath or tachypnea  Cardiovascular: negative for chest pain, dyspnea on exertion, edema or paroxysmal nocturnal dyspnea  Gastrointestinal: negative for abdominal pain, appetite loss, change in bowel habits, constipation, diarrhea, melena or nausea/vomiting  Genito-Urinary: negative for dysuria, hematuria or incontinence  Musculoskeletal: negative for gait disturbance, joint pain, joint swelling or muscle pain  Neurological: negative for confusion, dizziness, headaches, seizures or tremors  Dermatological: negative for pruritus or rash      PHYSICAL EXAM:   Vitals: BP (!) 146/70   Pulse 98   Temp 97.9 °F (36.6 °C) (Oral)   Resp 18   Ht 5' 6\" (1.676 m)   Wt 156 lb 4.9 oz (70.9 kg)   SpO2 96%   BMI 25.23 kg/m²   General appearance: alert, appears stated age and cooperative  Skin: Skin color, texture, turgor normal. No rashes or lesions  HEENT: Head: Normocephalic, no lesions, without obvious abnormality.   Neck: no adenopathy  Lungs: clear to auscultation bilaterally  Heart: regular rate and rhythm, S1, S2 normal, no murmur, click, rub or gallop  Abdomen: soft, non-tender; bowel sounds normal; no masses,  no organomegaly  Extremities: extremities normal, atraumatic, no cyanosis or edema  Neurologic: Mental status: Alert, oriented, thought content appropriate      LABS:       Electronically signed by Rachel Alvarez MD on 7/20/2018 at 11:14 AM

## 2018-07-20 NOTE — DISCHARGE SUMMARY
[address]    Discharge Summary     Patient ID: Adela Galvez  :  [id number]   MRN: [id number]     ACCOUNT:  [de-identified]   Patient's PCP: Kwasi Slater MD  Admit Date: 7/15/2018   Discharge Date: 2018     Length of Stay: 5  Code Status:  Full Code  Admitting Physician: Kwasi Slater MD  Discharge Physician: Williams Adams MD     Active Discharge Diagnoses:       Primary Problem  <principal problem not specified>      [place] Problems    Diagnosis Date Noted    Mild malnutrition ([address] [E44.1] 2018    Metastasis to bone ([address] [C79.51] 2018    Closed wedge compression fracture of fifth lumbar vertebra ([address] [S32.050A] 2018    Pathological fracture of vertebra due to neoplastic disease with routine healing [M84.58XD] 2018    Pathological fracture of sacral vertebra [facility] 2018    Pathological fracture of thoracic vertebra with routine healing [M84.48XD] 2018    Metastatic breast cancer ([address] Herchel Case 07/15/2018    Closed fracture of first thoracic vertebra ([address] [T80.431F] 07/15/2018    Pathological fracture of vertebra due to neoplastic disease [M84.58XA] 07/15/2018       Admission Condition:  poor     Discharged Condition: fair    Hospital Stay:       Hospital Course:  Adela Galvez is a 81 y.o. female who was admitted for the management of  <principal problem not specified> , presented to ER with *Knee Pain (left side)  Patient presented with left knee pain, weakness and malaise, difficulty standing from sitting position, and a slowly progressive fungating lesion on her right chest wall that began approximately 3 years ago. The lesion was producing purulent and foul-smelling pus. Imaging studies found breast cancer with metastases to the bone. The metastasis was widespread and had invaded bilateral femurs increasing risk of pathologic fracture.   Patient was started on Norco, Zofran, and Decadron for symptom control. Hematology/oncology, orthopedic surgery, Gen. surgery, and palliative care were consulted for management. Patient was taken to the OR for breast mass biopsy and kyphoplasty. The following day the patient was taken back to the OR for bilateral femur prophylactic IM nails. Patient was monitored for several days, and symptoms were managed medically. 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  Estrogen receptor 30% positive, progesterone receptor 3-5% positive, and HER-2 testing is pending. Patient was started on Femara, with the plan to add Ibrance as an outpatient. Patient was also found to be in an early stage of CLL/SLL, but she does not meet criteria for treatment of CLL/SLL at this point in time. Patient was discharged to [facility]. Patient to follow-up with Dr. Jojo Kyle and Dr. Clementine Choe outpatient. Significant therapeutic interventions: Symptomatic management with Norco, Zofran, and Decadron. Patient underwent breast biopsy, kyphoplasty, and prophylactic IM nails in bilateral femurs. Patient was started on Femara, with the plan to add [facility] outpatient for treatment of breast cancer.     Significant Diagnostic Studies:   Labs / Micro:  CBC:   Lab Results   Component Value Date    WBC 53.0 07/20/2018    RBC 2.69 07/20/2018    HGB 8.1 07/20/2018    HCT 24.9 07/20/2018    MCV 92.6 07/20/2018    MCH 30.2 07/20/2018    MCHC 32.6 07/20/2018    RDW 15.4 07/20/2018     07/20/2018     HFP:    Lab Results   Component Value Date    PROT 6.0 07/15/2018     CMP:    Lab Results   Component Value Date    GLUCOSE 108 07/20/2018     07/20/2018    K 4.5 07/20/2018     07/20/2018    CO2 24 07/20/2018    BUN 16 07/20/2018    CREATININE 0.46 07/20/2018    ANIONGAP 12 07/20/2018    ALKPHOS 145 07/15/2018    ALT 10 07/15/2018    AST 86 07/15/2018    BILITOT 0.75 07/15/2018 LABALBU 3.3 07/15/2018    ALBUMIN NOT REPORTED 07/15/2018    LABGLOM >60 07/20/2018    GFRAA >60 07/20/2018    GFR      07/20/2018    GFR NOT REPORTED 07/20/2018    PROT 6.0 07/15/2018    CALCIUM 8.6 07/20/2018     U/A:    Lab Results   Component Value Date    COLORU YELLOW 07/17/2018    TURBIDITY CLEAR 07/17/2018    SPECGRAV 1.010 07/17/2018    HGBUR NEGATIVE 07/17/2018    PHUR 6.5 07/17/2018    PROTEINU NEGATIVE 07/17/2018    GLUCOSEU NEGATIVE 07/17/2018    KETUA NEGATIVE 07/17/2018    BILIRUBINUR NEGATIVE 07/17/2018    UROBILINOGEN Normal 07/17/2018    NITRU NEGATIVE 07/17/2018    LEUKOCYTESUR TRACE 07/17/2018       Radiology:  Xr Lumbar Spine (2-3 Views)    Result Date: 7/16/2018  EXAMINATION: SPOT FLUOROSCOPIC IMAGES 7/16/2018 1:45 pm; 7/16/2018 1:48 pm TECHNIQUE: Fluoroscopy was provided by the radiology department for procedure. Radiologist was not present during examination. FLUOROSCOPY DOSE AND TYPE OR TIME AND EXPOSURES: 527.7 seconds. 28.67 rad COMPARISON: None HISTORY: Intraprocedural imaging. FINDINGS: Twenty one spot images of the spine were obtained. Limited intraoperative fluoroscopic images demonstrate instruments and cement related to kyphoplasty performed at several levels. The exact levels are indeterminate based on the provided views. Sacral plasty also appears to have been performed. Please refer to the operative report for detailed description. Intraprocedural fluoroscopic spot images as above. See separate procedure report for more information. Xr Pelvis (1-2 Views)    Result Date: 7/15/2018  EXAMINATION: TWO XRAY VIEWS OF THE LEFT HUMERUS; TWO XRAY VIEWS OF THE RIGHT HUMERUS; SINGLE XRAY VIEW OF THE PELVIS; FOUR XRAY VIEWS OF THE RIGHT FEMUR; FOUR XRAY VIEWS OF THE LEFT FEMUR; 2 XRAY VIEWS OF THE LEFT KNEE 7/15/2018 7:56 pm COMPARISON: None.  HISTORY: ORDERING SYSTEM PROVIDED HISTORY: lesions seen on CT chest TECHNOLOGIST PROVIDED HISTORY: Reason for exam:->lesions seen on CT chest Ordering Physician Provided Reason for Exam: pain Acuity: Unknown Type of Exam: Unknown Relevant Medical/Surgical History: mets FINDINGS: Metastatic survey was performed. Left humerus:  Lucent lesion is seen along the medial aspect of the left proximal humerus. Slightly heterogeneous appearance of the left femoral head is seen on the lateral view. Right humerus:  Subtle cortical irregularity is seen along the right mid humerus. Right lower extremity:  Subtle heterogeneous appearance of the right ischial tuberosity. Suggestion of a lucent lesion is seen within the right proximal femur. Subtle lucencies are seen within the mid to distal femur. Degenerative changes are seen within the right knee. Subtle lucency along the distal femoral metaphysis is suspicious for metastatic disease. No joint effusion. Left lower extremity:  Subtle lucency is seen within the mid femur. Irregularity is seen along the distal femur. Lucency is seen within the proximal femur. Cortical breakthrough is seen involving the greater trochanter. Pelvis:  Subtle lucencies throughout the pelvis are suspicious for osseous metastatic disease. Diffuse osseous metastatic disease. Findings may be related to known breast cancer. Multiple myeloma can have a similar appearance. Xr Humerus Left (min 2 Views)    Result Date: 7/15/2018  EXAMINATION: TWO XRAY VIEWS OF THE LEFT HUMERUS; TWO XRAY VIEWS OF THE RIGHT HUMERUS; SINGLE XRAY VIEW OF THE PELVIS; FOUR XRAY VIEWS OF THE RIGHT FEMUR; FOUR XRAY VIEWS OF THE LEFT FEMUR; 2 XRAY VIEWS OF THE LEFT KNEE 7/15/2018 7:56 pm COMPARISON: None. HISTORY: ORDERING SYSTEM PROVIDED HISTORY: lesions seen on CT chest TECHNOLOGIST PROVIDED HISTORY: Reason for exam:->lesions seen on CT chest Ordering Physician Provided Reason for Exam: pain Acuity: Unknown Type of Exam: Unknown Relevant Medical/Surgical History: mets FINDINGS: Metastatic survey was performed.  Left humerus:  Lucent lesion is seen along 7/17/2018  EXAMINATION: To XRAY VIEWS OF THE LEFT FEMUR 7/17/2018 9:39 pm COMPARISON: None. HISTORY: ORDERING SYSTEM PROVIDED HISTORY: sp left femur IMN TECHNOLOGIST PROVIDED HISTORY: Reason for exam:->sp left femur IMN Ordering Physician Provided Reason for Exam: left femur IMN Acuity: Acute Type of Exam: Initial Additional signs and symptoms: post op FINDINGS: There is an intramedullary hitesh and compression screw in the femur. Skin staples and soft tissue swelling are noted laterally. There is subcutaneous gas laterally as well. Cortical margins intact. Postop changes as above and probable hematoma laterally. Xr Femur Left (min 2 Views)    Result Date: 7/15/2018  EXAMINATION: TWO XRAY VIEWS OF THE LEFT HUMERUS; TWO XRAY VIEWS OF THE RIGHT HUMERUS; SINGLE XRAY VIEW OF THE PELVIS; FOUR XRAY VIEWS OF THE RIGHT FEMUR; FOUR XRAY VIEWS OF THE LEFT FEMUR; 2 XRAY VIEWS OF THE LEFT KNEE 7/15/2018 7:56 pm COMPARISON: None. HISTORY: ORDERING SYSTEM PROVIDED HISTORY: lesions seen on CT chest TECHNOLOGIST PROVIDED HISTORY: Reason for exam:->lesions seen on CT chest Ordering Physician Provided Reason for Exam: pain Acuity: Unknown Type of Exam: Unknown Relevant Medical/Surgical History: mets FINDINGS: Metastatic survey was performed. Left humerus:  Lucent lesion is seen along the medial aspect of the left proximal humerus. Slightly heterogeneous appearance of the left femoral head is seen on the lateral view. Right humerus:  Subtle cortical irregularity is seen along the right mid humerus. Right lower extremity:  Subtle heterogeneous appearance of the right ischial tuberosity. Suggestion of a lucent lesion is seen within the right proximal femur. Subtle lucencies are seen within the mid to distal femur. Degenerative changes are seen within the right knee. Subtle lucency along the distal femoral metaphysis is suspicious for metastatic disease. No joint effusion.  Left lower extremity:  Subtle lucency is seen within Metastatic survey was performed. Left humerus:  Lucent lesion is seen along the medial aspect of the left proximal humerus. Slightly heterogeneous appearance of the left femoral head is seen on the lateral view. Right humerus:  Subtle cortical irregularity is seen along the right mid humerus. Right lower extremity:  Subtle heterogeneous appearance of the right ischial tuberosity. Suggestion of a lucent lesion is seen within the right proximal femur. Subtle lucencies are seen within the mid to distal femur. Degenerative changes are seen within the right knee. Subtle lucency along the distal femoral metaphysis is suspicious for metastatic disease. No joint effusion. Left lower extremity:  Subtle lucency is seen within the mid femur. Irregularity is seen along the distal femur. Lucency is seen within the proximal femur. Cortical breakthrough is seen involving the greater trochanter. Pelvis:  Subtle lucencies throughout the pelvis are suspicious for osseous metastatic disease. Diffuse osseous metastatic disease. Findings may be related to known breast cancer. Multiple myeloma can have a similar appearance. Xr Knee Right (1-2 Views)    Result Date: 7/15/2018  EXAMINATION: 2 XRAY VIEWS OF THE RIGHT KNEE 7/15/2018 7:56 pm COMPARISON: None. HISTORY: ORDERING SYSTEM PROVIDED HISTORY: mets TECHNOLOGIST PROVIDED HISTORY: Reason for exam:->mets Ordering Physician Provided Reason for Exam: mets/pain Acuity: Unknown Type of Exam: Unknown FINDINGS: Metastatic survey was performed. Left humerus:  Lucent lesion is seen along the medial aspect of the left proximal humerus. Slightly heterogeneous appearance of the left femoral head is seen on the lateral view. Right humerus:  Subtle cortical irregularity is seen along the right mid humerus. Right lower extremity:  Subtle heterogeneous appearance of the right ischial tuberosity. Suggestion of a lucent lesion is seen within the right proximal femur.   Subtle lucencies are seen within the mid to distal femur. Degenerative changes are seen within the right knee. Subtle lucency along the distal femoral metaphysis is suspicious for metastatic disease. No joint effusion. Left lower extremity:  Subtle lucency is seen within the mid femur. Irregularity is seen along the distal femur. Lucency is seen within the proximal femur. Cortical breakthrough is seen involving the greater trochanter. Pelvis:  Subtle lucencies throughout the pelvis are suspicious for osseous metastatic disease. Diffuse osseous metastatic disease. Findings may be related to known breast cancer. Multiple myeloma can have a similar appearance. Ct Head Wo Contrast    Result Date: 7/15/2018  EXAMINATION: CT OF THE HEAD WITHOUT CONTRAST  7/15/2018 11:22 am TECHNIQUE: CT of the head was performed without the administration of intravenous contrast. Dose modulation, iterative reconstruction, and/or weight based adjustment of the mA/kV was utilized to reduce the radiation dose to as low as reasonably achievable. COMPARISON: None HISTORY: ORDERING SYSTEM PROVIDED HISTORY: evaluate for brain metastasis TECHNOLOGIST PROVIDED HISTORY: Ordering Physician Provided Reason for Exam: EVALUATE FOR BRAIN METS Acuity: Unknown Type of Exam: Unknown 59-year-old female; evaluate for brain metastasis FINDINGS: BRAIN/VENTRICLES: No abnormal extra-axial fluid collections identified. Mild diffuse prominence of the sulci, cisterns, and extra-axial spaces. Ventricles are mildly enlarged and midline in position. Atherosclerotic calcification of the distal vertebral and bilateral parasellar carotid arteries. No abnormal extra-axial fluid collections identified. No clear evidence for acute intracranial hemorrhage, mass, mass effect, or midline shift identified by CT. Foramen magnum and fourth ventricle appear patent.  Mild to moderate periventricular and subcortical white matter hypoattenuation, most likely representing mild to moderate chronic small vessel ischemic white matter disease. ORBITS: The visualized portion of the orbits demonstrate no acute abnormality. SINUSES: The visualized paranasal sinuses and mastoid air cells demonstrate no acute abnormality. SOFT TISSUES/SKULL:  Edema/soft tissue thickening and induration of the subcutaneous fat, skin, and musculature of the right suboccipital soft tissue/neck. There is a lytic lesion of the right frontal calvarium on image 29, series 3 most likely representing osseous metastasis     1. Lytic lesion in the right frontal calvarium most likely representing osseous metastatic disease. 2. No obvious mass or midline shift identified on limited noncontrast CT. Assuming there are no contraindications to MRI or contrast, the most sensitive exam would be an MR brain with contrast to evaluate for intracranial metastasis. 3. No clear evidence for acute intracranial hemorrhage or midline shift. 4. Mild to moderate chronic small vessel ischemic white matter disease. Atherosclerotic calcification of the vasculature. Mild atrophy. 5. Edema, soft tissue thickening, induration of the subcutaneous fat, skin, and musculature of the right suboccipital neck/soft tissues, the cause of which is unclear. This could be related to malignancy, recent intervention, or infection depending upon the clinical setting. Ct Soft Tissue Neck W Contrast    Result Date: 7/17/2018  EXAMINATION: CT OF THE NECK SOFT TISSUE WITH CONTRAST  7/15/2018 TECHNIQUE: CT of the neck was performed with the administration of intravenous contrast. Multiplanar reformatted images are provided for review. Dose modulation, iterative reconstruction, and/or weight based adjustment of the mA/kV was utilized to reduce the radiation dose to as low as reasonably achievable. COMPARISON: None.  HISTORY: ORDERING SYSTEM PROVIDED HISTORY: R. and left neck edema, breast mass, evaluate metastatic cancer TECHNOLOGIST PROVIDED HISTORY: Ordering Physician Provided Reason for Exam: LOOKING FOR METS, RT AND LT NECK EDEMA, BREAST MASS Acuity: Unknown Type of Exam: Unknown FINDINGS: PHARYNX/LARYNX: The nasopharynx, oropharynx, hypopharynx, and laryngeal structures are grossly unremarkable. There is a mild asymmetry on the right, likely secondary to below described mass. No evidence of retropharyngeal abscess. There is mild extension of above process into the lateral retropharyngeal space inferiorly. SALIVARY GLANDS/THYROID: There may be involvement of right parotid gland with infiltrating mass coming in close proximity to right submandibular gland. The left submandibular gland and left parotid gland are unremarkable. The thyroid gland appears grossly unremarkable with small nonspecific left thyroid lesions. LYMPH NODES: There are mildly prominent nonspecific left-sided cervical lymph nodes. Limited evaluation of right cervical lymph node due to extensive infiltrating mass. SOFT TISSUES: There is an infiltrating mass within right lateral and right posterior neck extending to the midline. This mass extends superiorly to the level of the right parotid and probably involving right parotid gland. This mass comes in close proximity to the right ear. This mass extends inferiorly into the right chest wall and in the right breast.  There appears to be destruction of the right-sided ribs. No evidence of discrete soft tissue abscess. There is extensive edema involving and adjacent to the mass. There is narrowing of right internal jugular vein without evidence of occlusion. This mass surrounds the right common, right subclavian artery, and proximal aspect of the right internal carotid artery. BRAIN/ORBITS/SINUSES: There is mild-to-moderate dilatation of lateral ventricle, partially imaged. No enhancing intracranial mass is identified. This is partially evaluated. The paranasal sinuses and mastoid air cells are clear.  LUNG APICES/SUPERIOR MEDIASTINUM: Infiltrating mass extends into the right anterior chest wall and right breast.  There is destruction of the anterior right rib. Small bilateral pleural effusions. Multiple enlarged mediastinal lymph nodes. Please see separate CT chest report. BONES: There are lytic lesions within C7 and T1 vertebral body. There appears to be pathologic fracture of T1 vertebral body. There may be epidural component at T1 vertebral level. There is also sternal metastasis. 1. Large infiltrating neoplasm within right aspect of the neck, extending superiorly to the level of the right parotid and inferiorly into the right anterior chest wall and right breast.  Inferior extent is not imaged on this examination. Please see CT chest report. 2. Above described mass surrounds and narrows multiple right-sided neck vessels without definite evidence of occlusion. 3. Probable osseous metastases at C7, T1, and within the sternum. There may be pathologic fracture of T1 vertebral body with mild vertebral body height loss. There may be epidural component at T1 vertebral level. Follow-up MRI may be obtained as clinically warranted. 4. Mediastinal lymphadenopathy would be better evaluated on a CT chest examination. 5. Mild-to-moderate dilatation of the lateral ventricle is partially imaged. Please see separate CT head report. If there is a concern for intracranial metastasis, follow-up MRI brain with and without contrast may be obtained.      Mri Thoracic Spine W Wo Contrast    Result Date: 7/15/2018  EXAMINATION: MRI OF THE THORACIC SPINE WITHOUT AND WITH CONTRAST; MRI OF THE LUMBAR SPINE WITHOUT AND WITH CONTRAST  7/15/2018 7:40 pm; 7/15/2018 7:43 pm TECHNIQUE: Multiplanar multisequence MRI of the thoracic spine was performed without and with the administration of intravenous contrast.; Multiplanar multisequence MRI of the lumbar spine was performed without and with the administration of intravenous contrast. COMPARISON: None HISTORY: neural foramen at L4-5. 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 Mri Lumbar Spine W Wo Contrast    Result Date: 7/15/2018  EXAMINATION: MRI OF THE THORACIC SPINE WITHOUT AND WITH CONTRAST; MRI OF THE LUMBAR SPINE WITHOUT AND WITH CONTRAST  7/15/2018 7:40 pm; 7/15/2018 7:43 pm TECHNIQUE: Multiplanar multisequence MRI of the thoracic spine was performed without and with the administration of intravenous contrast.; Multiplanar multisequence MRI of the lumbar spine was performed without and with the administration of intravenous contrast. COMPARISON: None HISTORY: ORDERING SYSTEM PROVIDED HISTORY: METASTASES TO SPINE TECHNOLOGIST PROVIDED HISTORY: Ordering Physician Provided Reason for Exam: mets to spine Additional signs and symptoms: history of breast cancer per patient FINDINGS: Thoracic spine: No acute fracture or traumatic subluxation is identified. There is mild to moderate multilevel degenerative disc disease characterized by disc desiccation, disc height loss, and osteophyte formation. There is a 6 mm left paracentral disc protrusion at T6-T7 and a 3 mm central disc protrusion at T7-T8. The thoracic cord is normal in size and signal intensity. Marrow replacing lesions are scattered throughout the thoracic spine including dominant lesions centered on the spinous processes of T3 and T4. No epidural collection or mass is identified. There is partially visualized mediastinal adenopathy and there are bilateral pleural effusions. Lumbar spine: No acute fracture is identified. Grade 1 anterolisthesis of L4 on L5 is likely degenerative in origin. Expansile, marrow replacing lesions are seen scattered throughout the spine including dominant lesions centered on the L3 and L4 spinous processes well as the right sacral wing.   The lesion within the posterior aspect of the L5 vertebral body demonstrates extra osseous extension into the anterior epidural space measuring up to 4.3 mm in AP FOR MASS Acuity: Unknown Type of Exam: Unknown FINDINGS: Chest: Mediastinum: Thoracic aorta appears normal in caliber. Pulmonary trunk and its branches demonstrate no evidence of pulmonary embolism. Heart appears normal in size without pericardial effusion. Multiple prominent to enlarged mediastinal lymph nodes are identified, some which are partially calcified largest measuring 1.2 cm short axis within the right peritracheal region. No definite hilar lymphadenopathy. The esophagus is grossly unremarkable. Lungs/pleura: Moderate bilateral pleural effusions are noted with compressive atelectasis involving the lower lobes. Trachea and distal airways appear patent. No suspicious noncalcified lung nodule or mass. Scattered areas scarring are noted. Soft Tissues/Bones: A large ill-defined breast mass is seen involving the left breast, seen the majority of the left breast with partially calcified enlarged left axillary lymphadenopathy. There is associated skin thickening noted. There appears to be extension of this mass into the chest wall with loss of the pectoralis contours. There also appears to be prominent to enlarged left axillary lymph nodes largest measuring 1.4 cm in short axis. There is associated rib destruction involving the anterior aspect of the right 4th rib. Additional expansile lytic lesions are identified involving the right 5th, 8th and 10th ribs as well as the left 4th through 6th ribs. Additional lytic lesions are identified involving the T1, T12 and L1 vertebral bodies as well as the body of the sternum. Additional lytic lesions are also identified involving the visualized portions of the proximal humeri. In addition to the findings above, there appears to be soft tissue infiltration involving the right aspect of the chest extending into the thoracic inlet and along the anterior aspect of the inferior aspect of the neck which appears to extend into the superior mediastinum.   The right subclavian vein is not clearly identified. Abdomen/Pelvis: Organs: No enhancing liver lesion is noted. Portal vein, gallbladder, spleen, pancreas and adrenal glands all appear unremarkable. Kidneys appear unremarkable. Abdominal aorta appears normal in caliber. GI/Bowel: Stomach is grossly unremarkable. Small bowel appears nondilated. No acute colonic abnormality is seen. Appendix not clearly identified. Pelvis: Fibroid uterus is seen with a pedunculated fibroid which is partially calcified. No adnexal mass or cyst.  Urinary bladder is grossly unremarkable. Peritoneum/Retroperitoneum: No free air or free fluid. No lymphadenopathy. Bones/Soft Tissues: Abdominal wall demonstrates body wall anasarca. Lytic lesions are identified throughout the lumbar spine and bony pelvis and hips with associated cortical destruction largest seen along the right iliac wing measuring 4.0 x 3.4 cm. No pathological fractures are noted. *Large ill-defined right breast mass with associated skin thickening extension into the chest wall. There is associated metastatic lymphadenopathy involving the axillary regions as well as the mediastinum. In addition, there appears to be soft tissue infiltration involving the right chest extending into the thoracic inlet and neck also suggestive of metastatic disease with the right subclavian vein not clearly visualized. 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 No pathological fractures are noted. *Moderate bilateral pleural effusions possibly metastatic. *Pedunculated partially calcified fibroid uterus. Given the above findings, dedicated pelvic ultrasound is recommended to confirm findings. *No definitive CT evidence of intra-abdominal metastatic disease.      Ct Chest Pulmonary Embolism W Contrast    Result Date: 7/15/2018  EXAMINATION: CT OF THE ABDOMEN AND PELVIS WITH CONTRAST; CTA OF THE CHEST 7/15/2018 11:44 am; 7/15/2018 11:49 am aspect of the femoral neck and proximal diaphysis. Smaller scattered metastatic lesions throughout the right femur. A large metastatic lesion is also seen in the left intertrochanteric femur and lower femoral neck. A large metastatic lesion in the distal left femoral shaft demonstrates anterior cortical thinning and penetration into the adjacent soft tissues. There is mild periosteal edema. Metastatic lesions also seen in the bilateral ischial tuberosities. Large metastatic lesion in the left medial femoral condyles. No definite fracture. No dislocation. 1. Diffusely scattered metastatic lesions versus myeloma throughout the bilateral femora and also involving the bilateral ischial tuberosities. 2. Large lesions in the bilateral intertrochanteric femora and lower femoral necks place the patient at increased risk for pathologic fracture. 3. Large lesion in the distal left femoral shaft with anterior cortical thinning and penetration into the adjacent soft tissues. This also places the patient at high risk for pathologic fracture. Fluoro For Surgical Procedures    Result Date: 7/17/2018  Radiology exam is complete. No Radiologist dictation. Please follow up with ordering provider. Fluoro For Surgical Procedures    Result Date: 7/17/2018  Radiology exam is complete. No Radiologist dictation. Please follow up with ordering provider. Fluoro For Surgical Procedures    Result Date: 7/16/2018  EXAMINATION: SPOT FLUOROSCOPIC IMAGES 7/16/2018 1:45 pm; 7/16/2018 1:48 pm TECHNIQUE: Fluoroscopy was provided by the radiology department for procedure. Radiologist was not present during examination. FLUOROSCOPY DOSE AND TYPE OR TIME AND EXPOSURES: 527.7 seconds. 28.67 rad COMPARISON: None HISTORY: Intraprocedural imaging. FINDINGS: Twenty one spot images of the spine were obtained.  Limited intraoperative fluoroscopic images demonstrate instruments and cement related to kyphoplasty performed at several levels. The exact levels are indeterminate based on the provided views. Sacral plasty also appears to have been performed. Please refer to the operative report for detailed description. Intraprocedural fluoroscopic spot images as above. See separate procedure report for more information. Mri Femur Right Wo Contrast    Result Date: 7/16/2018  EXAMINATION: MRI OF THE RIGHT FEMUR WITHOUT CONTRAST; MRI OF THE LEFT FEMUR WITHOUT CONTRAST, 7/16/2018 9:02 pm TECHNIQUE: Multiplanar multisequence MRI of the right femur was performed without the administration of intravenous contrast.; Multiplanar multisequence MRI of the left femur was performed without the administration of intravenous contrast. COMPARISON: Bilateral femur radiographs 07/15/2018. HISTORY: ORDERING SYSTEM PROVIDED HISTORY: right femur metastasis TECHNOLOGIST PROVIDED HISTORY: Ordering Physician Provided Reason for Exam: right femur metastasis FINDINGS: SOFT TISSUES: Mild likely reactive edema in the bilateral proximal adductor muscles and left quadriceps musculature. No abnormal soft tissue mass or drainable fluid collection. The visualized tendons are grossly intact. BONE MARROW: There are scattered metastatic lesions throughout the bilateral femora. The largest lesion in the right femur involves the entire intertrochanteric portion and extends into the lower aspect of the femoral neck and proximal diaphysis. Smaller scattered metastatic lesions throughout the right femur. A large metastatic lesion is also seen in the left intertrochanteric femur and lower femoral neck. A large metastatic lesion in the distal left femoral shaft demonstrates anterior cortical thinning and penetration into the adjacent soft tissues. There is mild periosteal edema. Metastatic lesions also seen in the bilateral ischial tuberosities. Large metastatic lesion in the left medial femoral condyles. No definite fracture. No dislocation.      1. Diffusely scattered

## 2018-07-20 NOTE — PROGRESS NOTES
[address] [facility].    PROGRESS NOTE             7/20/2018    8:50 AM    Name:   Gerhardt Reels  MRN:     [id number]     [place]:      [de-identified]   Room:   2065/2065-01  IP Day:  5  Admit Date:  7/15/2018  8:59 AM    PCP:   Amaury Kang MD  Code Status:  Full Code    Subjective:     C/C:   Chief Complaint   Patient presents with    Knee Pain     left side     Interval History Status: improved. Patient doing well this morning. Awake alert and oriented. Answers all questions appropriately. No acute events overnight. Denies chest pain, abdominal pain, SOB, and waist pain. Surgical incisions covered in bandage, pain well controlled. Had small BM yesterday. Hutton removed, urinated on own. Currently no peripheral IV access. Brief History:     81year old female presenting with weakness, fatigue and pain in lower extremities secondary to metastatic breast cancer. Patient had surgical biopsy of breast mass on 7/16 and kyphoplasty of several vertebrae. Nail rods in bilateral femurs on 7/17. Review of Systems:     Review of Systems   Constitutional: Negative for chills and fever. Respiratory: Negative for cough, sputum production, shortness of breath and wheezing. Cardiovascular: Positive for leg swelling. Negative for chest pain and palpitations. Gastrointestinal: Negative for abdominal pain, blood in stool, constipation, diarrhea, nausea and vomiting. Genitourinary: Negative for frequency and urgency. Neurological: Negative for dizziness and headaches. Medications:      Allergies:  No Known Allergies    Current Meds:   Scheduled Meds:    letrozole  2.5 mg Oral Daily    lisinopril  10 mg Oral Daily    pantoprazole  40 mg Oral QAM AC    senna  1 tablet Oral Nightly    sodium chloride flush  10 mL Intravenous 2 times per day    docusate sodium  100 mg Oral BID    sodium chloride flush  10 mL Intravenous 2 times per day    enoxaparin  40 mg Subcutaneous Daily    amLODIPine  10 mg Oral Daily    dexamethasone  4 mg Oral 4 times per day     Continuous Infusions:   PRN Meds: sodium chloride flush, acetaminophen, [MAR Hold] potassium chloride **OR** [MAR Hold] potassium chloride **OR** [MAR Hold] potassium chloride, sodium chloride flush, magnesium hydroxide, ondansetron, HYDROcodone 5 mg - acetaminophen, HYDROcodone 5 mg - acetaminophen, LORazepam, [MAR Hold] fentanNYL, fentanNYL, fentanNYL, sodium chloride flush    Data:     Past Medical History:   has a past medical history of Breast cancer ([facility] [address] Essential hypertension; and Metastatic breast cancer ([facility] [address] Social History:   reports that she has never smoked. She has never used smokeless tobacco. She reports that she drinks alcohol. She reports that she does not use drugs. Family History:   Family History   Problem Relation Age of Onset    Uterine Cancer Mother 48    Prostate Cancer Father 79    Cancer Maternal Grandmother         pancreas    Colon Cancer Maternal Grandfather [de-identified]       Vitals:  BP (!) 146/70   Pulse 98   Temp 97.9 °F (36.6 °C) (Oral)   Resp 18   Ht 5' 6\" (1.676 m)   Wt 156 lb 4.9 oz (70.9 kg)   SpO2 96%   BMI 25.23 kg/m²   Temp (24hrs), Av °F (36.7 °C), Min:97.9 °F (36.6 °C), Max:98.1 °F (36.7 °C)    Recent Labs      18   1619  18   0737  18   1119   POCGLU  117*  120*  102  119*       I/O (24Hr):     Intake/Output Summary (Last 24 hours) at 18 0850  Last data filed at 18 0630   Gross per 24 hour   Intake             1250 ml   Output             2260 ml   Net            -1010 ml       Labs:    [unfilled]    No results found for: SPECIAL  No results found for: CULTURE    [unfilled]    Radiology:    Xr Lumbar Spine (2-3 Views)    Result Date: 2018  EXAMINATION: SPOT FLUOROSCOPIC IMAGES 2018 1:45 pm; 2018 1:48 pm TECHNIQUE: Fluoroscopy was provided by the radiology department for extremity:  Subtle lucency is seen within the mid femur. Irregularity is seen along the distal femur. Lucency is seen within the proximal femur. Cortical breakthrough is seen involving the greater trochanter. Pelvis:  Subtle lucencies throughout the pelvis are suspicious for osseous metastatic disease. Diffuse osseous metastatic disease. Findings may be related to known breast cancer. Multiple myeloma can have a similar appearance. Xr Humerus Left (min 2 Views)    Result Date: 7/15/2018  EXAMINATION: TWO XRAY VIEWS OF THE LEFT HUMERUS; TWO XRAY VIEWS OF THE RIGHT HUMERUS; SINGLE XRAY VIEW OF THE PELVIS; FOUR XRAY VIEWS OF THE RIGHT FEMUR; FOUR XRAY VIEWS OF THE LEFT FEMUR; 2 XRAY VIEWS OF THE LEFT KNEE 7/15/2018 7:56 pm COMPARISON: None. HISTORY: ORDERING SYSTEM PROVIDED HISTORY: lesions seen on CT chest TECHNOLOGIST PROVIDED HISTORY: Reason for exam:->lesions seen on CT chest Ordering Physician Provided Reason for Exam: pain Acuity: Unknown Type of Exam: Unknown Relevant Medical/Surgical History: mets FINDINGS: Metastatic survey was performed. Left humerus:  Lucent lesion is seen along the medial aspect of the left proximal humerus. Slightly heterogeneous appearance of the left femoral head is seen on the lateral view. Right humerus:  Subtle cortical irregularity is seen along the right mid humerus. Right lower extremity:  Subtle heterogeneous appearance of the right ischial tuberosity. Suggestion of a lucent lesion is seen within the right proximal femur. Subtle lucencies are seen within the mid to distal femur. Degenerative changes are seen within the right knee. Subtle lucency along the distal femoral metaphysis is suspicious for metastatic disease. No joint effusion. Left lower extremity:  Subtle lucency is seen within the mid femur. Irregularity is seen along the distal femur. Lucency is seen within the proximal femur. Cortical breakthrough is seen involving the greater trochanter.  Pelvis: Subtle lucencies throughout the pelvis are suspicious for osseous metastatic disease. Diffuse osseous metastatic disease. Findings may be related to known breast cancer. Multiple myeloma can have a similar appearance. Xr Humerus Right (min 2 Views)    Result Date: 7/15/2018  EXAMINATION: TWO XRAY VIEWS OF THE LEFT HUMERUS; TWO XRAY VIEWS OF THE RIGHT HUMERUS; SINGLE XRAY VIEW OF THE PELVIS; FOUR XRAY VIEWS OF THE RIGHT FEMUR; FOUR XRAY VIEWS OF THE LEFT FEMUR; 2 XRAY VIEWS OF THE LEFT KNEE 7/15/2018 7:56 pm COMPARISON: None. HISTORY: ORDERING SYSTEM PROVIDED HISTORY: lesions seen on CT chest TECHNOLOGIST PROVIDED HISTORY: Reason for exam:->lesions seen on CT chest Ordering Physician Provided Reason for Exam: pain Acuity: Unknown Type of Exam: Unknown Relevant Medical/Surgical History: mets FINDINGS: Metastatic survey was performed. Left humerus:  Lucent lesion is seen along the medial aspect of the left proximal humerus. Slightly heterogeneous appearance of the left femoral head is seen on the lateral view. Right humerus:  Subtle cortical irregularity is seen along the right mid humerus. Right lower extremity:  Subtle heterogeneous appearance of the right ischial tuberosity. Suggestion of a lucent lesion is seen within the right proximal femur. Subtle lucencies are seen within the mid to distal femur. Degenerative changes are seen within the right knee. Subtle lucency along the distal femoral metaphysis is suspicious for metastatic disease. No joint effusion. Left lower extremity:  Subtle lucency is seen within the mid femur. Irregularity is seen along the distal femur. Lucency is seen within the proximal femur. Cortical breakthrough is seen involving the greater trochanter. Pelvis:  Subtle lucencies throughout the pelvis are suspicious for osseous metastatic disease. Diffuse osseous metastatic disease. Findings may be related to known breast cancer.   Multiple myeloma can have a similar breakthrough is seen involving the greater trochanter. Pelvis:  Subtle lucencies throughout the pelvis are suspicious for osseous metastatic disease. Diffuse osseous metastatic disease. Findings may be related to known breast cancer. Multiple myeloma can have a similar appearance. Xr Knee Right (1-2 Views)    Result Date: 7/15/2018  EXAMINATION: 2 XRAY VIEWS OF THE RIGHT KNEE 7/15/2018 7:56 pm COMPARISON: None. HISTORY: ORDERING SYSTEM PROVIDED HISTORY: mets TECHNOLOGIST PROVIDED HISTORY: Reason for exam:->mets Ordering Physician Provided Reason for Exam: mets/pain Acuity: Unknown Type of Exam: Unknown FINDINGS: Metastatic survey was performed. Left humerus:  Lucent lesion is seen along the medial aspect of the left proximal humerus. Slightly heterogeneous appearance of the left femoral head is seen on the lateral view. Right humerus:  Subtle cortical irregularity is seen along the right mid humerus. Right lower extremity:  Subtle heterogeneous appearance of the right ischial tuberosity. Suggestion of a lucent lesion is seen within the right proximal femur. Subtle lucencies are seen within the mid to distal femur. Degenerative changes are seen within the right knee. Subtle lucency along the distal femoral metaphysis is suspicious for metastatic disease. No joint effusion. Left lower extremity:  Subtle lucency is seen within the mid femur. Irregularity is seen along the distal femur. Lucency is seen within the proximal femur. Cortical breakthrough is seen involving the greater trochanter. Pelvis:  Subtle lucencies throughout the pelvis are suspicious for osseous metastatic disease. Diffuse osseous metastatic disease. Findings may be related to known breast cancer. Multiple myeloma can have a similar appearance.      Ct Head Wo Contrast    Result Date: 7/15/2018  EXAMINATION: CT OF THE HEAD WITHOUT CONTRAST  7/15/2018 11:22 am TECHNIQUE: CT of the head was performed without the administration of intravenous contrast. Dose modulation, iterative reconstruction, and/or weight based adjustment of the mA/kV was utilized to reduce the radiation dose to as low as reasonably achievable. COMPARISON: None HISTORY: ORDERING SYSTEM PROVIDED HISTORY: evaluate for brain metastasis TECHNOLOGIST PROVIDED HISTORY: Ordering Physician Provided Reason for Exam: EVALUATE FOR BRAIN METS Acuity: Unknown Type of Exam: Unknown 82-year-old female; evaluate for brain metastasis FINDINGS: BRAIN/VENTRICLES: No abnormal extra-axial fluid collections identified. Mild diffuse prominence of the sulci, cisterns, and extra-axial spaces. Ventricles are mildly enlarged and midline in position. Atherosclerotic calcification of the distal vertebral and bilateral parasellar carotid arteries. No abnormal extra-axial fluid collections identified. No clear evidence for acute intracranial hemorrhage, mass, mass effect, or midline shift identified by CT. Foramen magnum and fourth ventricle appear patent. Mild to moderate periventricular and subcortical white matter hypoattenuation, most likely representing mild to moderate chronic small vessel ischemic white matter disease. ORBITS: The visualized portion of the orbits demonstrate no acute abnormality. SINUSES: The visualized paranasal sinuses and mastoid air cells demonstrate no acute abnormality. SOFT TISSUES/SKULL:  Edema/soft tissue thickening and induration of the subcutaneous fat, skin, and musculature of the right suboccipital soft tissue/neck. There is a lytic lesion of the right frontal calvarium on image 29, series 3 most likely representing osseous metastasis     1. Lytic lesion in the right frontal calvarium most likely representing osseous metastatic disease. 2. No obvious mass or midline shift identified on limited noncontrast CT.  Assuming there are no contraindications to MRI or contrast, the most sensitive exam would be an MR brain with contrast to evaluate for intracranial or traumatic subluxation is identified. There is mild to moderate multilevel degenerative disc disease characterized by disc desiccation, disc height loss, and osteophyte formation. There is a 6 mm left paracentral disc protrusion at T6-T7 and a 3 mm central disc protrusion at T7-T8. The thoracic cord is normal in size and signal intensity. Marrow replacing lesions are scattered throughout the thoracic spine including dominant lesions centered on the spinous processes of T3 and T4. No epidural collection or mass is identified. There is partially visualized mediastinal adenopathy and there are bilateral pleural effusions. Lumbar spine: No acute fracture is identified. Grade 1 anterolisthesis of L4 on L5 is likely degenerative in origin. Expansile, marrow replacing lesions are seen scattered throughout the spine including dominant lesions centered on the L3 and L4 spinous processes well as the right sacral wing. The lesion within the posterior aspect of the L5 vertebral body demonstrates extra osseous extension into the anterior epidural space measuring up to 4.3 mm in AP diameter on image number 22 of series 8. A uterine mass is partially visualized, probably a fibroid. There are sacral Tarlov cysts. There is multilevel degenerative disc disease characterized by disc desiccation, disc height loss, and osteophyte formation. A disc bulge results in mild narrowing of the thecal sac and both neural foramen at L2-3. A disc bulge and facet hypertrophy results in moderate narrowing of the thecal sac and the left neural foramen at L2-3. A disc bulge and facet hypertrophy results in mild to moderate narrowing of the thecal sac and mild-to-moderate narrowing of both the neural foramen at L4-5. 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      Ct Abdomen Pelvis W Iv Contrast    Result Date: 7/15/2018  EXAMINATION: CT OF THE ABDOMEN AND PELVIS WITH CONTRAST; CTA OF THE CHEST 7/15/2018 11:44 am; 7/15/2018 11:49 am TECHNIQUE: CT of the abdomen and pelvis was performed with the administration of intravenous contrast. Multiplanar reformatted images are provided for review. Dose modulation, iterative reconstruction, and/or weight based adjustment of the mA/kV was utilized to reduce the radiation dose to as low as reasonably achievable.; CTA of the chest was performed after the administration of intravenous contrast.  Multiplanar reformatted images are provided for review. MIP images are provided for review. Dose modulation, iterative reconstruction, and/or weight based adjustment of the mA/kV was utilized to reduce the radiation dose to as low as reasonably achievable. COMPARISON: None HISTORY: ORDERING SYSTEM PROVIDED HISTORY: breast mass, evaluate metastatic lesions TECHNOLOGIST PROVIDED HISTORY: IV Only Contrast Ordering Physician Provided Reason for Exam: BREAST MASS, EVALUATE METASTIC LESION Acuity: Unknown Type of Exam: Unknown; ORDERING SYSTEM PROVIDED HISTORY: evaluate for mass TECHNOLOGIST PROVIDED HISTORY: Ordering Physician Provided Reason for Exam: EVALUATE FOR MASS Acuity: Unknown Type of Exam: Unknown FINDINGS: Chest: Mediastinum: Thoracic aorta appears normal in caliber. Pulmonary trunk and its branches demonstrate no evidence of pulmonary embolism. Heart appears normal in size without pericardial effusion. Multiple prominent to enlarged mediastinal lymph nodes are identified, some which are partially calcified largest measuring 1.2 cm short axis within the right peritracheal region. No definite hilar lymphadenopathy. The esophagus is grossly unremarkable. Lungs/pleura: Moderate bilateral pleural effusions are noted with compressive atelectasis involving the lower lobes. Trachea and distal airways appear patent. No suspicious noncalcified lung nodule or mass. Scattered areas scarring are noted.  Soft Tissues/Bones: A large ill-defined breast mass is seen involving the left breast, seen BREAST MASS, EVALUATE METASTIC LESION Acuity: Unknown Type of Exam: Unknown; ORDERING SYSTEM PROVIDED HISTORY: evaluate for mass TECHNOLOGIST PROVIDED HISTORY: Ordering Physician Provided Reason for Exam: EVALUATE FOR MASS Acuity: Unknown Type of Exam: Unknown FINDINGS: Chest: Mediastinum: Thoracic aorta appears normal in caliber. Pulmonary trunk and its branches demonstrate no evidence of pulmonary embolism. Heart appears normal in size without pericardial effusion. Multiple prominent to enlarged mediastinal lymph nodes are identified, some which are partially calcified largest measuring 1.2 cm short axis within the right peritracheal region. No definite hilar lymphadenopathy. The esophagus is grossly unremarkable. Lungs/pleura: Moderate bilateral pleural effusions are noted with compressive atelectasis involving the lower lobes. Trachea and distal airways appear patent. No suspicious noncalcified lung nodule or mass. Scattered areas scarring are noted. Soft Tissues/Bones: A large ill-defined breast mass is seen involving the left breast, seen the majority of the left breast with partially calcified enlarged left axillary lymphadenopathy. There is associated skin thickening noted. There appears to be extension of this mass into the chest wall with loss of the pectoralis contours. There also appears to be prominent to enlarged left axillary lymph nodes largest measuring 1.4 cm in short axis. There is associated rib destruction involving the anterior aspect of the right 4th rib. Additional expansile lytic lesions are identified involving the right 5th, 8th and 10th ribs as well as the left 4th through 6th ribs. Additional lytic lesions are identified involving the T1, T12 and L1 vertebral bodies as well as the body of the sternum. Additional lytic lesions are also identified involving the visualized portions of the proximal humeri.  In addition to the findings above, there appears to be soft tissue infiltration involving the right aspect of the chest extending into the thoracic inlet and along the anterior aspect of the inferior aspect of the neck which appears to extend into the superior mediastinum. The right subclavian vein is not clearly identified. Abdomen/Pelvis: Organs: No enhancing liver lesion is noted. Portal vein, gallbladder, spleen, pancreas and adrenal glands all appear unremarkable. Kidneys appear unremarkable. Abdominal aorta appears normal in caliber. GI/Bowel: Stomach is grossly unremarkable. Small bowel appears nondilated. No acute colonic abnormality is seen. Appendix not clearly identified. Pelvis: Fibroid uterus is seen with a pedunculated fibroid which is partially calcified. No adnexal mass or cyst.  Urinary bladder is grossly unremarkable. Peritoneum/Retroperitoneum: No free air or free fluid. No lymphadenopathy. Bones/Soft Tissues: Abdominal wall demonstrates body wall anasarca. Lytic lesions are identified throughout the lumbar spine and bony pelvis and hips with associated cortical destruction largest seen along the right iliac wing measuring 4.0 x 3.4 cm. No pathological fractures are noted. *Large ill-defined right breast mass with associated skin thickening extension into the chest wall. There is associated metastatic lymphadenopathy involving the axillary regions as well as the mediastinum. In addition, there appears to be soft tissue infiltration involving the right chest extending into the thoracic inlet and neck also suggestive of metastatic disease with the right subclavian vein not clearly visualized. 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 No pathological fractures are noted. *Moderate bilateral pleural effusions possibly metastatic. *Pedunculated partially calcified fibroid uterus. Given the above findings, dedicated pelvic ultrasound is recommended to confirm findings.  *No definitive CT evidence of intra-abdominal metastatic disease. Mri Femur Left Wo Contrast    Result Date: 7/16/2018  EXAMINATION: MRI OF THE RIGHT FEMUR WITHOUT CONTRAST; MRI OF THE LEFT FEMUR WITHOUT CONTRAST, 7/16/2018 9:02 pm TECHNIQUE: Multiplanar multisequence MRI of the right femur was performed without the administration of intravenous contrast.; Multiplanar multisequence MRI of the left femur was performed without the administration of intravenous contrast. COMPARISON: Bilateral femur radiographs 07/15/2018. HISTORY: ORDERING SYSTEM PROVIDED HISTORY: right femur metastasis TECHNOLOGIST PROVIDED HISTORY: Ordering Physician Provided Reason for Exam: right femur metastasis FINDINGS: SOFT TISSUES: Mild likely reactive edema in the bilateral proximal adductor muscles and left quadriceps musculature. No abnormal soft tissue mass or drainable fluid collection. The visualized tendons are grossly intact. BONE MARROW: There are scattered metastatic lesions throughout the bilateral femora. The largest lesion in the right femur involves the entire intertrochanteric portion and extends into the lower aspect of the femoral neck and proximal diaphysis. Smaller scattered metastatic lesions throughout the right femur. A large metastatic lesion is also seen in the left intertrochanteric femur and lower femoral neck. A large metastatic lesion in the distal left femoral shaft demonstrates anterior cortical thinning and penetration into the adjacent soft tissues. There is mild periosteal edema. Metastatic lesions also seen in the bilateral ischial tuberosities. Large metastatic lesion in the left medial femoral condyles. No definite fracture. No dislocation. 1. Diffusely scattered metastatic lesions versus myeloma throughout the bilateral femora and also involving the bilateral ischial tuberosities.  2. Large lesions in the bilateral intertrochanteric femora and lower femoral necks place the patient at increased risk for pathologic fracture. 3. Large lesion in the distal left femoral shaft with anterior cortical thinning and penetration into the adjacent soft tissues. This also places the patient at high risk for pathologic fracture. Fluoro For Surgical Procedures    Result Date: 7/17/2018  Radiology exam is complete. No Radiologist dictation. Please follow up with ordering provider. Fluoro For Surgical Procedures    Result Date: 7/17/2018  Radiology exam is complete. No Radiologist dictation. Please follow up with ordering provider. Fluoro For Surgical Procedures    Result Date: 7/16/2018  EXAMINATION: SPOT FLUOROSCOPIC IMAGES 7/16/2018 1:45 pm; 7/16/2018 1:48 pm TECHNIQUE: Fluoroscopy was provided by the radiology department for procedure. Radiologist was not present during examination. FLUOROSCOPY DOSE AND TYPE OR TIME AND EXPOSURES: 527.7 seconds. 28.67 rad COMPARISON: None HISTORY: Intraprocedural imaging. FINDINGS: Twenty one spot images of the spine were obtained. Limited intraoperative fluoroscopic images demonstrate instruments and cement related to kyphoplasty performed at several levels. The exact levels are indeterminate based on the provided views. Sacral plasty also appears to have been performed. Please refer to the operative report for detailed description. Intraprocedural fluoroscopic spot images as above. See separate procedure report for more information. Mri Femur Right Wo Contrast    Result Date: 7/16/2018  EXAMINATION: MRI OF THE RIGHT FEMUR WITHOUT CONTRAST; MRI OF THE LEFT FEMUR WITHOUT CONTRAST, 7/16/2018 9:02 pm TECHNIQUE: Multiplanar multisequence MRI of the right femur was performed without the administration of intravenous contrast.; Multiplanar multisequence MRI of the left femur was performed without the administration of intravenous contrast. COMPARISON: Bilateral femur radiographs 07/15/2018.  HISTORY: ORDERING SYSTEM PROVIDED HISTORY: right femur metastasis TECHNOLOGIST PROVIDED HISTORY: Ordering Physician Provided Reason for Exam: right femur metastasis FINDINGS: SOFT TISSUES: Mild likely reactive edema in the bilateral proximal adductor muscles and left quadriceps musculature. No abnormal soft tissue mass or drainable fluid collection. The visualized tendons are grossly intact. BONE MARROW: There are scattered metastatic lesions throughout the bilateral femora. The largest lesion in the right femur involves the entire intertrochanteric portion and extends into the lower aspect of the femoral neck and proximal diaphysis. Smaller scattered metastatic lesions throughout the right femur. A large metastatic lesion is also seen in the left intertrochanteric femur and lower femoral neck. A large metastatic lesion in the distal left femoral shaft demonstrates anterior cortical thinning and penetration into the adjacent soft tissues. There is mild periosteal edema. Metastatic lesions also seen in the bilateral ischial tuberosities. Large metastatic lesion in the left medial femoral condyles. No definite fracture. No dislocation. 1. Diffusely scattered metastatic lesions versus myeloma throughout the bilateral femora and also involving the bilateral ischial tuberosities. 2. Large lesions in the bilateral intertrochanteric femora and lower femoral necks place the patient at increased risk for pathologic fracture. 3. Large lesion in the distal left femoral shaft with anterior cortical thinning and penetration into the adjacent soft tissues. This also places the patient at high risk for pathologic fracture. Physical Examination:        Physical Exam   Constitutional: She is oriented to person, place, and time and well-developed, well-nourished, and in no distress. No distress. HENT:   Head: Normocephalic and atraumatic. Eyes: EOM are normal. Pupils are equal, round, and reactive to light. Neck: Normal range of motion. Neck supple.    Erythema, warmth and induration extending from the right breast mass into the right neck. Cardiovascular: Normal rate, regular rhythm and normal heart sounds. No murmur heard. Pulmonary/Chest: Effort normal and breath sounds normal. No respiratory distress. She has no wheezes. Large, ulcerating, and necrotic mass over the anterior aspect of the right chest, overlying the breast. Warmth and induration to palpation. No tenderness. Covered in bandage. Musculoskeletal: Normal range of motion. She exhibits edema. She exhibits no deformity. Neurological: She is alert and oriented to person, place, and time. Skin: Skin is warm and dry. She is not diaphoretic.   4 surgical incision sites, covered in dry and intact bandage. Two sites on the lateral aspect of her right thigh, two sites on the lateral aspect of left thigh. Bandages replaced this AM. No drainage at this time   Psychiatric: Mood and affect normal.         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 with routine healing [M84.58XD] 07/16/2018    Pathological fracture of sacral vertebra [facility] 07/16/2018    Pathological fracture of thoracic vertebra with routine healing [M84.48XD] 07/16/2018    Metastatic breast cancer ([address] [C50.919] 07/15/2018    Closed fracture of first thoracic vertebra ([address] [S22.019A] 07/15/2018    Pathological fracture of vertebra due to neoplastic disease [M84.58XA] 07/15/2018       Plan:        Metastatic breast cancer  - s/p breast mass biopsy, kyphoplasty, and nail rods in bilateral femurs  - Prelim biopsy results show micropapillary variant of adenocarcinoma. ER 30% positive, AL 3-5% positive, HER-2 pending.  Started on

## 2018-07-20 NOTE — FLOWSHEET NOTE
A mutual friend was visiting when Myrtle Sweeney came to room. Patient is going to [facility] for rehab today. She is in good spirits and is receptive to the various doctors and their recommendations. She has been overwhelmed by the support and care of her family and friends as well as her Restoration. Patient indicated even with the dx, she is in a good place. 07/20/18 1015   Encounter Summary   Services provided to: Patient   Referral/Consult From: [address] [place] [address] Family members   Continue Visiting (7-20-18)   Complexity of Encounter Moderate   Length of Encounter 30 minutes   Spiritual Assessment Completed Yes   Routine   Type Follow up   Spiritual/Latter-day   Type Spiritual support   Assessment Calm; Approachable;Peaceful   Intervention Active listening;Explored feelings, thoughts, concerns;Explored coping resources;Prayer;Scripture;Sustaining presence/ Ministry of presence; Discussed illness/injury and it's impact; Discussed belief system/Rastafarian practices/shalini   Outcome Expressed gratitude;Comfort;Engaged in conversation;Expressed feelings/needs/concerns;Coping; Hopeful;New perspective

## 2018-07-20 NOTE — PROGRESS NOTES
No events overnight. Patient indicates that her left knee pain has improved. She denies having any pain proximally on either lower extremity. BP (!) 146/70   Pulse 98   Temp 97.9 °F (36.6 °C) (Oral)   Resp 18   Ht 5' 6\" (1.676 m)   Wt 156 lb 4.9 oz (70.9 kg)   SpO2 96%   BMI 25.23 kg/m²   Evaluation of bilateral lower extremities demonstrate her incisions to be clean and intact. She does have some drainage from the middle incision on the left hip. Sensation is grossly intact light touch in all dermatomes bilaterally. She is able to actively dorsiflex and plantarflex her toes and feet. She has 2+ pedal pulses bilaterally. Impression and plan: Ms. Maryuri Rodriguez is a 61-year-old with metastatic breast adenocarcinoma status post bilateral femur prophylactic IM nails postop day 3    - Pain control   - DVT prophylaxis  - Proceed with daily dressing changes. Keep incisions clean and dry at all times  - Mobilize with physical therapy. Weightbearing as tolerated on the right lower extremity and partial weightbearing on the left lower extremity  - Will discuss possible radiation therapy to left femur metaphyseal lesion with oncologists. Not to begin on total sutures are out in 2 weeks. - Patient to be discharged today to skilled nursing facility.   Follow-up with me as an outpatient in 2 weeks

## 2018-07-20 NOTE — PROGRESS NOTES
[address]    INPATIENT OCCUPATIONAL THERAPY  PROGRESS NOTE  Date: 2018  Patient Name: Rosangela Srinivasan      Room:   MRN: [id number]    : 1945  (71 y.o.) Gender: female     Discharge Recommendations:  [address] (Due to likely longer rehab process due to pain may need Skilled Nursing facility  - limited home assist/suport;  eventual Ca treatment    )  Equipment Needed: Yes  Mobility Devices: ADL Assistive Devices  ADL Assistive Devices: Toileting - Raised Toilet Seat with arms, Transfer Tub Bench, Sock-Aid Hard, Reacher, Long-handled Sponge, Long-handled [employer]    Referring Practitioner: Dr Baljinder Cornejo / Dr Kit Scheuermann / Dr Kevin Curtis   Diagnosis: Metastatic Breast Ca -  femur mets  -  s/p IMN   General  Chart Reviewed: Yes  Patient assessed for rehabilitation services?: Yes  Additional Pertinent Hx: Breast Ca with areli Metastaic disease (vertebrae, femurs and humerus) knee pain   Family / Caregiver Present: No  Referring Practitioner: Dr Baljinder Cornejo / Dr Kit Scheuermann / Dr Kevin Curtis   Diagnosis: Metastatic Breast Ca -  femur mets  -  s/p IMN     Restrictions  Restrictions/Precautions: Weight Bearing  Spinal Precautions: s/p mutilpe level kyphoplasty   Other position/activity restrictions: s/p Bilatereal prophalatic Femoral IMN procedures for metastatic breast Ca  Right Lower Extremity Weight Bearing: Weight Bearing As Tolerated  Left Lower Extremity Weight Bearing: Partial Weight Bearing      Subjective  Subjective: Pt reports decreased anxiety today c use of breathing tech  Comments: Pt motivated although does experience anxiety prior to task requiring. Patient Currently in Pain: Yes  Pain Location: Leg  Pain Orientation: Right;Left  Overall Orientation Status: Within Normal Limits  Patient Observation  Observations: Pt lying in bed on entry, sitting in bedside recliner on exit with instructions to call nursing when ready to move back to bed.     Objective     Bed mobility  Supine to Sit: Moderate assistance (req A to initiate LE movements, A c trunk)  Sit to Supine:  (2 person , sit to supine)  Scooting:  (hips to EOB)  Comment: HOB raised; encouraged to distance to elevated HOB or ID POC to simulate home settingc G V understanding  Balance  Sitting Balance: Contact guard assistance  Standing Balance: Minimal assistance (c RW)  Standing Balance  Time: 1-2min  Activity: mobility from EOB pivot to L advancing 5 steps forward; pt declines to ambulate backward d/t fatigue and then sat in recliner  Sit to stand: Minimal assistance  Stand to sit: Minimal assistance  Comment: requiring Ax1 today ; was Ax2 date prior  Functional Mobility  Functional - Mobility Device: Rolling Walker  Assist Level: Minimal assistance  Functional Mobility Comments: MinAx1 c RW; increased understanding V/D c PEB LLE   ADL  Feeding: Increased time to complete;Modified independent   Grooming: Stand by assistance;Setup  UE Bathing: Contact guard assistance  LE Bathing: Maximum assistance  UE Dressing: Moderate assistance  LE Dressing: Dependent/Total;Maximum assistance  Toileting: Dependent/Total  Additional Comments: grooming from bedside chair only today; not completed this tx; did trial sock aid date prior     Transfers  Sit to stand: Minimal assistance  Stand to sit: Minimal assistance  Transfer Comments: left knee pain when not supported from under her foot when sitting EOB/Chair            Additional Activities: Heavy review on pursed lip breathing. Pt demo more natural breathing pattern today danish requests extended time to demo for therapists                Assessment  Performance deficits / Impairments: Decreased functional mobility ; Decreased ADL status; Decreased strength;Decreased safe awareness;Decreased endurance  Prognosis: Fair;Good  Discharge Recommendations: [address] (Due to likely longer rehab process due to pain may need Skilled Nursing facility  - limited home assist/suport;  eventual Ca treatment    )  Activity Tolerance: Patient limited by fatigue;Patient limited by pain  Activity Tolerance: Attempted all tasks but did require extra time in response to pain in left knee with reporitioning and transfers    Safety Devices in place: Yes  Type of devices: Left in chair;Call light within reach;Nurse notified             Patient Education:  Patient Education: Therapy POC,  Modified care strategies, DME / AE     Learner:patient  Method: demonstration and explanation       Outcome: demonstrated understanding     Plan  Safety Devices  Safety Devices in place: Yes  Type of devices: Left in chair, Call light within reach, Nurse notified  Plan  Times per week: 2-5 sessions   Times per day: Daily  Current Treatment Recommendations: Strengthening, Endurance Training, Patient/Caregiver Education & Training, Self-Care / ADL, Pain Management, Positioning, Safety Education & Training, Functional Mobility Training      Goals  Short term goals  Time Frame for Short term goals: 2-4 days   Short term goal 1: Tolerate 10-25 minutes of General UB activity / Exercise to increase tolerance to care tasks / mobiltiy   Short term goal 2: D/V understanding of  DME / [place] / Modified care strategies for self care and mobility   Short term goal 3: D/V understanding of home safety and fall prevention technqiues with application to care needs   Short term goal 4: Participate in seated self care and mobility tasksusing safe techniques for self care     OT Individual Minutes  Time In: 0915  Time Out: [address]  Minutes: 50      Electronically signed by GLYNN Dumont on 7/20/18 at 3:43 PM

## 2018-07-20 NOTE — PROGRESS NOTES
Pt discharged with belongings per stretcher by [facility], report given to transporters, pt stable at time of discharge

## 2018-07-23 NOTE — TELEPHONE ENCOUNTER
Shanna Marie at Dr. Johan Price office notified me that Dr. Alice Joy relates that she does not need to see patient in follow up at this time.  Is available in future if port needed.

## 2018-07-23 NOTE — TELEPHONE ENCOUNTER
Dereck Handley and spoke with nurse caring for Phyllis Abarca. I was given patient's room number. Called and spoke with Phyllis Abarca. Explained who I am and why I am calling. As patient's nurse navigator I am her to help coordinate care, answer questions, give support, a resource person. I was made aware of her case due to her recent breast biopsy. We discussed her recent hospital stay. Pt has good understanding of what is going on. She relates that she has known for some time that she had breast cancer, but it was never biopsied until this hospital stay. She relates she had rods put in both femurs (bilateral femur prophylactic IM nails), she had cement placed in spine for stabilization (kyphoplasty). She was started on a medication by Dr. Earline Leos to treat the breast cancer. I confirmed with jimmy the name of medication Femara. We offered to assist her with making her follow up appointments as needed with physicians. She is receptive to this. Pt did not have a pencil and paper to write down my contact information. I let her know I will mail it to [facility] to her. I will also give my contact information and name to the [facility] staff so it is easily accessible to patient and staff. Pt relates she has appointment on Thursday at [facility] but does not know who. I verified with patient that there is appointment for her to see Radiation Oncology to discuss if there is a role for radiation therapy to treat her cancer at present. Her appointment is at 0930 am on Thursday 7/26/18. We scheduled her for follow up with Dr. Earline Leos at [facility] for 8/3/18 at 1:40pm.  Explained that Dr. Shereen De La Cruz would want to follow up with her to discuss any treatment that she may have to offer patient. Pt does relate that she believes her breast does have some drainage from it. I will contact Dr. Shereen De La Cruz and get back with patient with any additional appointments. Notified Cory Lainez at Dr. Chirag Ortiz office of scheduled appt's at [facility].   Gave

## 2018-07-23 NOTE — TELEPHONE ENCOUNTER
Called Dr. Johan Price office spoke with Shanna Marie, inquired about Catherine's positive biopsy. Inquired if patient was aware of results. Notified that patient has not made appointment for follow up in office yet. Messages have been left for patient from Dr Christal Mcneil office to schedule appointment. I looked into epic and pt had orthopedic surgery day after biopsy. Pt was discharged to [facility] for rehab. I called and notified staff that pt was discharged to [facility].

## 2018-07-30 NOTE — TELEPHONE ENCOUNTER
Spoke with Pt's nurse Micaela, explained who I am and that I wanted to make sure that they were aware of patient's upcoming appointments. I inquired if I should give information to nurse or if there was a  that I should give info to. I was transferred to [facility] who handles patient transportation at [facility]. I reviewed with her the upcoming appointments with ortho, med onc and rad onc and mri testing at [facility]. Reji Cochran date, appointment times, and location for appointments. Reji Cochran my name and contact number. Explained that each oncology patient with [facility]) should have a nurse navigator that helps coordinate care. I encouraged her to contact me if she needs assistance in future with [facility]) Oncology patients.

## 2018-08-03 NOTE — PROGRESS NOTES
PROGRESS NOTE    PCP: Tara Vigil MD  Referring Provider: No ref. provider found    IMPRESSION:   1.  Adenocarcinoma of the breast locally extensive with widespread bony metastases  2.  Generalized pain, currently much better controlled  3.  Hypertension  4. 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 Multiple orthopedic procedures to stabilize impending pathologic fractures. 6.  Radiation therapy plan for lytic bone lesions  7. Systemic treatment started with letrozole, Ibrance to be added today     INTERVAL HISTORY:   Since leaving the hospital has been in rehab center and is improving walking with a walker and getting around much better  Pain syndrome is much easier to control  Examination shows that the cutaneous metastases getting smaller and less red and appears to be less drainage around her right breast.  This all would suggest early response to letrozole treatment. We will add Ibrance to the program we discussed this with her going over potential side effects rationale for starting at mechanics of the drug. We will check with the specialty pharmacies and get that started. It was explained to her that Edwina Snyder is an oral medication that is taken daily for 3 weeks then one week off. She will continue on daily letrozole. PLAN:     1. Continue letrozole  2. Add Ibrance  3. Radiation therapy to the lytic bone lesions  4. Continue rehab  5. Reevaluate in 4 weeks    Return in about 4 weeks (around 8/31/2018). VISIT DIAGNOSIS:  The primary encounter diagnosis was Metastatic breast cancer ([facility] A diagnosis of Metastasis to bone [facility]) was also pertinent to this visit.     PAST MEDICAL HISTORY:      Diagnosis Date    Breast cancer [facility])     neglected fungating mass    Essential hypertension 4/30/2018    Metastatic breast cancer ([facility] 7/15/2018       PAST SURGICAL HISTORY:      Procedure Laterality Date    ABDOMEN SURGERY      exporatory in 80s    DC OPEN RX FEMUR FX+INTRAMED ZULEYKA Bilateral 7/17/2018    FEMUR IM NAIL ZULEYKA INSERTION performed by All Colvin MD at [address] [place] [address] Street UNI/BI CANNULJ LMBR N/A 7/16/2018    OSTEOCOOL T1, T11, T12, L5 AND SACRUM, KYPHOPLASTY T1, T11, T12, L5 AND LUMBARSACRAL VERTEBROLPLASTY SACRUM performed by Delta August MD at [address] [place] [address] Street Right 7/16/2018    BREAST BIOPSY performed by Elizabeth Samayoa MD at [facility]:   Current Outpatient Prescriptions   Medication Sig Dispense Refill    HYDROcodone-acetaminophen (NORCO) 5-325 MG per tablet Take 1 tablet by mouth every 6 hours as needed for Pain. [place] palbociclib (IBRANCE) 125 MG capsule Take 125 mg by mouth daily 21 capsule 5    docusate sodium (COLACE, DULCOLAX) 100 MG CAPS Take 100 mg by mouth 2 times daily as needed for Constipation 60 capsule 1    pantoprazole (PROTONIX) 40 MG tablet Take 1 tablet by mouth daily 30 tablet 3    senna (SENOKOT) 8.6 MG tablet Take 1 tablet by mouth nightly 30 tablet 0    lisinopril (PRINIVIL;ZESTRIL) 10 MG tablet Take 1 tablet by mouth daily 30 tablet 0    letrozole (FEMARA) 2.5 MG tablet Take 1 tablet by mouth daily 30 tablet 0    ibuprofen (ADVIL;MOTRIN) 200 MG tablet Take 200 mg by mouth every 6 hours as needed for Pain      amLODIPine (NORVASC) 10 MG tablet Take 1 tablet by mouth daily 30 tablet 0     No current facility-administered medications for this visit. SUBJECTIVE:  Lizzy Webb is a very pleasant 72 y.o. woman with metastatic and locally advanced breast cancer.   Patient is known of lesion on the right breast there is extensively spread locally across her chest into her neck down into her abdominal wall.  Also his evidence of multiple bone metastases.  The patient has essentially become bedridden is not able to get up because of weakness and pain.  She is able to move her legs a little barely [place].  It is not clear if this is because of pain or because

## 2018-08-09 NOTE — TELEPHONE ENCOUNTER
Maria Del Carmen Varghese has a $700 co-pay and she is approved for $8000 through PAF for one year or until we exhaust the funding. [facility] to dispense. Delivery pending length of stay. Insurance pending one more week at [facility].  will update Patient Assistance on approval or denial for length of stay tomorrow Friday 8/10/18.  to approve or deny shipping of Ibrance to the facility at that time. Marlys Leong, MSN, RN (oral compliance) advised of education for patient and safe handling and education to . MONICA Arango, CPhT.   Resource Management  [facility]) Patient Assistance Program [address] [facility]  [address] [facility]  P: [phone]  F: [facility]

## 2018-09-06 NOTE — TELEPHONE ENCOUNTER
CLINICAL PHARMACY NOTE  Post-Discharge Transitions of Care ([facility])    Non-face-to-face services provided:  Assessment and support for treatment adherence and medication management-medication reconciliation    Subjective/Objective:  Jason Schwartz is a 72 y.o. female. Patient was discharged from [facility] on 8/10/18 with a diagnosis of metastatic breast cancer. Patient outreach to review discharge medications and provide medication review and management. Spoke with patient    No Known Allergies    Medication Sig    palbociclib (IBRANCE) 125 MG capsule Take 125 mg by mouth daily    pantoprazole (PROTONIX) 40 MG tablet Take 1 tablet by mouth daily    letrozole (FEMARA) 2.5 MG tablet Take 1 tablet by mouth daily     Meds to Beds:NA    Estimated Creatinine Clearance: 100 mL/min (A) (based on SCr of 0.46 mg/dL (L)). Assessment/Plan:  - Medication reconciliation completed. Number of medications reviewed: 8    - Pt is taking medications as directed by discharging physician. Number of discrepancies: 1. Instructions per discharge list provided except per below documentation. Identified medication discrepancies/issues:   · Category 1 (0)  · Category 2 (0)  · Category 3 (0)  · Category 4 (1):  1. Updated home medication list. Patient is no longer experiencing pain and is not taking any medications for pain. Removed Norco, ibuprofen and docusate (for opioid-induced constipation). Patient states that she is not taking amlodipine and lisinopril. Patient was discharged from [address] [place] [address] on 7/20/18 and on the patient's discharge AVS, lisinopril was stopped at discharge and amlodipine wasn't listed. These were ordered by the discharging physician, but unsure why they were not included on the patient's discharge summary if she was supposed to start these at the facility. Regardless, the patient doesn't have these medications at home and hasn't been taking.  Removed from home med list.     - [facility]

## 2018-09-07 NOTE — PROGRESS NOTES
Platelets [phone] k/uL    MPV 7.5 6.0 - 12.0 fL    NRBC Automated NOT REPORTED per 100 WBC       Electronically signed by Drew Helm MD on 9/7/2018 at 1:20 PM

## 2018-09-17 NOTE — ED PROVIDER NOTES
[address]  eMERGENCY dEPARTMENT eNCOUnter      Pt Name: Angelia Collet  MRN: [id number]  [place] 1945  Date of evaluation: 9/17/2018  PCP:    Emanuel Monsivais MD      [address] [place] [address]       Chief Complaint   Patient presents with    Dizziness    Nausea         HISTORY OF PRESENT ILLNESS      Angelia Collet is a 72 y.o. female who presents For evaluation for near-syncope. Patient states that she ate in the abdomen the bathroom quickly. Should episode of diarrhea and subsequently she was going she felt lightheaded. She states she didn't develop a sweating episode and felt cool and clammy. She never passed out. Denies any chest pain shortness of breath or headaches. History now she feels most part back to her baseline. She is getting radiation and chemotherapy however no fevers. Otherwise no other complaints       REVIEW OF SYSTEMS         Review of Systems   Constitutional: Negative for chills and fever. HENT: Negative for congestion and rhinorrhea. Eyes: Negative for pain and redness. Respiratory: Negative for cough and shortness of breath. Cardiovascular: Negative for chest pain and palpitations. Gastrointestinal: Positive for abdominal pain and nausea. Negative for vomiting. Genitourinary: Negative for dysuria, flank pain and urgency. Musculoskeletal: Negative for back pain, myalgias, neck pain and neck stiffness. Skin: Negative for rash. Neurological: Negative for light-headedness and headaches. Hematological: Does not bruise/bleed easily.           PAST MEDICAL HISTORY     Past Medical History:   Diagnosis Date    Breast cancer [facility])     neglected fungating mass    Essential hypertension 4/30/2018    Metastatic breast cancer ([facility] [address] 7/15/2018       SURGICAL HISTORY       Past Surgical History:   Procedure Laterality Date    ABDOMEN SURGERY      exporatory in 80s    [place] OPEN RX FEMUR FX+INTRAMED ZULEYKA Bilateral 7/17/2018    FEMUR IM NAIL ZULEYKA INSERTION Nose: Nose normal.   Mouth/Throat: Oropharynx is clear and moist.   Eyes: Pupils are equal, round, and reactive to light. Conjunctivae and EOM are normal.   Neck: Normal range of motion. Neck supple. Cardiovascular: Normal rate, regular rhythm, normal heart sounds and intact distal pulses. Pulmonary/Chest: Effort normal and breath sounds normal.   Abdominal: Soft. Bowel sounds are normal.   Musculoskeletal: Normal range of motion. Neurological: She is alert and oriented to person, place, and time. Skin: Skin is warm and dry. Nursing note and vitals reviewed. DIFFERENTIAL DIAGNOSIS/ MDM:     Patient here with a near syncopal.  Vital signs are stable. EKG is unremarkable. Blood with its far stable. Patient states back to her baseline.   Patient does want to go home at this time we'll do so    DIAGNOSTIC RESULTS     EKG: All EKG's are interpreted by the Emergency Department Physician who either signs or Co-signs this chart in the absence of a cardiologist.  EKG Interpretation    Interpreted by emergency department physician    Rhythm: normal sinus   Rate: normal  Axis: normal  Ectopy: none  Conduction: normal  ST Segments: nonspecific changes  T Waves: non specific changes  Q Waves: none    EKG  Impression: no acute changes      LABS:  Labs Reviewed   CBC WITH AUTO DIFFERENTIAL - Abnormal; Notable for the following:        Result Value    RBC 2.63 (*)     Hemoglobin 9.5 (*)     Hematocrit 26.8 (*)     .0 (*)     MCH 36.4 (*)     RDW 22.5 (*)     All other components within normal limits   BASIC METABOLIC PANEL - Abnormal; Notable for the following:     Glucose 116 (*)     Calcium 8.4 (*)     All other components within normal limits           EMERGENCY DEPARTMENT COURSE:   Vitals:    Vitals:    09/17/18 1804 09/17/18 1845   BP: (!) 150/68 (!) 148/73   Pulse: 69 76   Resp: 18 18   Temp: 97.7 °F (36.5 °C)    TempSrc: Oral    SpO2: 100% 100%   Weight: 119 lb (54 kg)    Height: 5' 6\" (1.676 m) -------------------------  BP: (!) 148/73, Temp: 97.7 °F (36.5 °C), Pulse: 76, Resp: 18      FINAL IMPRESSION      1.  Near syncope          DISPOSITION/PLAN    DISPOSITION Decision To Discharge 09/17/2018 06:53:38 PM      PATIENT REFERRED TO:  Lindsay Mayes MD   [address] [place] [address]  [phone]    Call in 1 day        DISCHARGE MEDICATIONS:  New Prescriptions    No medications on file       (Please note that portions of this note were completed with a voice recognition program.  Efforts were made to edit the dictations but occasionally words are mis-transcribed.)    Issa Sanz MD, ADONIS, [address] [facility],[address]  Attending Emergency Physician                     Issa Sanz MD  09/17/18 2178

## 2018-10-19 NOTE — PROGRESS NOTES
at [address] UNI/BI CANNULJ LMBR N/A 7/16/2018    OSTEOCOOL T1, T11, T12, L5 AND SACRUM, KYPHOPLASTY T1, T11, T12, L5 AND LUMBARSACRAL VERTEBROLPLASTY SACRUM performed by Shameka Olsen MD at [address] [place] [address] Street Right 7/16/2018    BREAST BIOPSY performed by Azeb Bullock MD at [facility]:   Current Outpatient Prescriptions   Medication Sig Dispense Refill    letrozole (FEMARA) 2.5 MG tablet Take 1 tablet by mouth daily 30 tablet 3    palbociclib (IBRANCE) 125 MG capsule Take 125 mg by mouth daily 21 capsule 5    pantoprazole (PROTONIX) 40 MG tablet Take 1 tablet by mouth daily 30 tablet 3     No current facility-administered medications for this visit. SUBJECTIVE:  Catherine Parikh is a very pleasant 72 y. o. woman with metastatic and locally advanced breast cancer. Patient is known of lesion on the right breast there is extensively spread locally across her chest into her neck down into her abdominal wall.  Also his evidence of multiple bone metastases.  The patient has essentially become bedridden is not able to get up because of weakness and pain.  She is able to move her legs a little barely [place].  It is not clear if this is because of pain or because of weakness.     Her past medical history is fairly negative, she denies heart disease strokes diabetes liver problems or kidney problems.  She does have hypertension. 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  She denies any prior surgeries.  She has not had a pregnancy she has not been on hormone replacement therapy. Doyle Rossi is a negative family history of breast cancer.  She is not aware of any medication allergies.  Her mother had cervical cancer father had prostate cancer maternal grandmother had pancreatic cancer and paternal grandfather had colon cancer.   She is a nonsmoker has never smoked regularly, she does not use alcohol    ROS:  General: negative for fatigue, fever or night sweats  ENT: negative for headaches, hearing change, oral lesions or visual changes  Hematological and Lymphatic: negative for bleeding problems, blood clots, bruising, fatigue, night sweats or weight loss  Respiratory: negative for cough, shortness of breath or tachypnea  Cardiovascular: negative for chest pain, dyspnea on exertion, edema or paroxysmal nocturnal dyspnea  Gastrointestinal: negative for abdominal pain, appetite loss, change in bowel habits, constipation, diarrhea, melena or nausea/vomiting  Genito-Urinary: negative for dysuria, hematuria or incontinence  Musculoskeletal: negative for gait disturbance, joint pain, joint swelling or muscle pain  Neurological: negative for confusion, dizziness, headaches, seizures or tremors  Dermatological: negative for pruritus or rash      PHYSICAL EXAM:   Vitals: /70   Pulse 67   Temp 99 °F (37.2 °C)   Wt 125 lb (56.7 kg)   BMI 20.18 kg/m²   General appearance: alert, appears stated age and cooperative  Skin: Skin color, texture, turgor normal. No rashes or lesions  HEENT: Head: Normocephalic, no lesions, without obvious abnormality.   Neck: no adenopathy  Lungs: clear to auscultation bilaterally  Heart: regular rate and rhythm, S1, S2 normal, no murmur, click, rub or gallop  Abdomen: soft, non-tender; bowel sounds normal; no masses,  no organomegaly  Extremities: extremities normal, atraumatic, no cyanosis or edema  Neurologic: Mental status: Alert, oriented, thought content appropriate      LABS:   Results for orders placed or performed during the hospital encounter of 10/19/18   CBC Auto Differential   Result Value Ref Range    WBC 3.0 (L) 3.5 - 11.0 k/uL    RBC 2.82 (L) 4.0 - 5.2 m/uL    Hemoglobin 10.6 (L) 12.0 - 16.0 g/dL    Hematocrit 30.8 (L) 36 - 46 %    .2 (H) 80 - 100 fL    MCH 37.7 (H) 26 - 34 pg    MCHC 34.5 31 - 37

## 2018-12-07 NOTE — ED PROVIDER NOTES
overdressed and becoming overheated. The patient admitted that she was hungry and diaphoretic at the time she had her syncopal episode. I believe this is the etiology of the patient's symptoms she will be discharged home her workup was effectively negative. I do not think the patient sustained a more serious etiology for her symptoms such as myocardial ischemia or pulmonary embolism. CRITICAL CARE:   None    PROCEDURES:    Procedures    DIAGNOSTIC RESULTS   EKG:All EKG's are interpreted by the Emergency Department Physician who either signs or Co-signs this chart in the absence of a cardiologist.  EKG: Sinus rhythm, unremarkable tracing. RADIOLOGY:All plain film, CT, MRI, and formal ultrasound images (except ED bedside ultrasound) are read by the radiologist, see reports below, unless otherwisenoted in MDM or here. XR CHEST STANDARD (2 VW)   Final Result   No acute process. LABS: All lab results were reviewed by myself, and all abnormals are listed below. Labs Reviewed   CBC WITH AUTO DIFFERENTIAL - Abnormal; Notable for the following:        Result Value    RBC 2.77 (*)     Hemoglobin 11.6 (*)     Hematocrit 32.4 (*)     .0 (*)     MCH 41.9 (*)     Monocytes 8 (*)     All other components within normal limits   COMPREHENSIVE METABOLIC PANEL W/ REFLEX TO MG FOR LOW K - Abnormal; Notable for the following:     Glucose 102 (*)     AST 71 (*)     All other components within normal limits   URINALYSIS - Abnormal; Notable for the following:     Ketones, Urine SMALL (*)     All other components within normal limits   POCT GLUCOSE - Normal   PERIPHERAL BLOOD SMEAR, PATH REVIEW   POC GLUCOSE FINGERSTICK     EMERGENCY DEPARTMENTCOURSE:   The patient's POC glucose= 72. The patient was hungry and a food tray was ordered. The patient also received 1 L of normal saline. The patient ate, was symptom-free her workup was unremarkable she was discharged home      Vitals:    Vitals:    12/07/18 1259

## 2018-12-18 NOTE — PROGRESS NOTES
Procedure: Bilateral femur intramedullary nailing  Date of procedure: 7/17/18    HPI: Ms. Cam Hummel is a 72-year-old who is approximately 5 months status post the aforementioned procedure. She continues to do well at this time indicating that she has no pain in either lower extremity. She has completed radiation therapy. Physical examination:  Evaluation of bilateral lower extremities demonstrate her incisions to be healed. Sensation is grossly intact light touch in all dermatomes and she has 2+ pedal pulses. She is nontender to palpation along the length of her femur bilaterally. She has good range of motion through her hips. Imaging studies: 2 x-ray views of bilateral femurs obtained today demonstrate her intramedullary nail implants to be in acceptable position without any evidence of loosening or migration. No obvious fracture, dislocation, or subluxation is appreciated. Impression and plan: Ms. Cam Hummel is a 72-year-old female 5 months status post prophylactic bilateral femur intramedullary nailing for metastatic breast CA.  is done very well. At this time she is having no issues. She was encouraged to keep up with her home exercise program.  She is currently using a walker for ambulation and she was encouraged to continue to do so. At this juncture I'll have her follow up in my clinic as needed but she was encouraged to return or call at anytime with questions and/or concerns.

## 2018-12-21 NOTE — PROGRESS NOTES
Vitals: BP (!) 142/82   Pulse 76   Temp 98.2 °F (36.8 °C)   Wt 119 lb 8 oz (54.2 kg)   BMI 19.29 kg/m²   General appearance: alert, appears stated age and cooperative  Skin: Skin color, texture, turgor normal. No rashes or lesions  HEENT: Head: Normocephalic, no lesions, without obvious abnormality.   Neck: no adenopathy  Lungs: clear to auscultation bilaterally  Heart: regular rate and rhythm, S1, S2 normal, no murmur, click, rub or gallop  Abdomen: soft, non-tender; bowel sounds normal; no masses,  no organomegaly  Extremities: extremities normal, atraumatic, no cyanosis or edema  Neurologic: Mental status: Alert, oriented, thought content appropriate      LABS:   Results for orders placed or performed during the hospital encounter of 12/07/18   CBC Auto Differential   Result Value Ref Range    WBC 4.5 3.5 - 11.0 k/uL    RBC 2.77 (L) 4.0 - 5.2 m/uL    Hemoglobin 11.6 (L) 12.0 - 16.0 g/dL    Hematocrit 32.4 (L) 36 - 46 %    .0 (H) 80 - 100 fL    MCH 41.9 (H) 26 - 34 pg    MCHC 35.8 31 - 37 g/dL    RDW 13.6 11.5 - 14.9 %    Platelets 727 934 - 448 k/uL    MPV 8.2 6.0 - 12.0 fL    NRBC Automated NOT REPORTED per 100 WBC    Differential Type NOT REPORTED     Immature Granulocytes NOT REPORTED 0 %    Absolute Immature Granulocyte NOT REPORTED 0.00 - 0.30 k/uL    WBC Morphology NOT REPORTED     RBC Morphology NOT REPORTED     Platelet Estimate NOT REPORTED     Seg Neutrophils 42 36 - 66 %    Lymphocytes 44 24 - 44 %    Atypical Lymphocytes 5 %    Monocytes 8 (H) 1 - 7 %    Eosinophils % 1 0 - 4 %    Basophils 0 0 - 2 %    Segs Absolute 1.89 1.3 - 9.1 k/uL    Absolute Lymph # 1.97 1.0 - 4.8 k/uL    Atypical Lymphocytes Absolute 0.23 k/uL    Absolute Mono # 0.36 0.1 - 1.3 k/uL    Absolute Eos # 0.05 0.0 - 0.4 k/uL    Basophils # 0.00 0.0 - 0.2 k/uL    Morphology MACROCYTOSIS PRESENT    Comprehensive Metabolic Panel w/ Reflex to MG   Result Value Ref Range    Glucose 102 (H) 70 - 99 mg/dL    BUN 13 8 - 23 mg/dL

## 2019-01-01 ENCOUNTER — TELEPHONE (OUTPATIENT)
Dept: ONCOLOGY | Age: 74
End: 2019-01-01

## 2019-01-01 ENCOUNTER — TELEPHONE (OUTPATIENT)
Dept: RADIATION ONCOLOGY | Age: 74
End: 2019-01-01

## 2019-01-01 ENCOUNTER — TELEPHONE (OUTPATIENT)
Dept: ORTHOPEDIC SURGERY | Age: 74
End: 2019-01-01

## 2019-01-01 ENCOUNTER — TELEPHONE (OUTPATIENT)
Dept: INFUSION THERAPY | Age: 74
End: 2019-01-01

## 2019-04-01 NOTE — TELEPHONE ENCOUNTER
Larisa Adams from [employer] called stating they have tried to get a hold of pt 5 times since 3/21 to set up [place] [facility]. They states they will keep trying, but if they cannot get a hold of her, they will put her RX on hold for one month. Writer attempted to call pt but she did not answer and no option to leave VM. Writer then called pt's emergency contact, Avinash Contreras, but had to leave a VM. Writer asked that he get in touch with pt and have her call our office.

## 2019-04-01 NOTE — TELEPHONE ENCOUNTER
Pt called back stating she enrolled in hospice in Feb and is no longer taking Ibrance or femara. Writer called [facility] and updated them on pt's status.

## 2019-04-03 NOTE — TELEPHONE ENCOUNTER
Pt called stating she needs to know what her prognosis is. She is currently enrolled in hospice, but she met with an  who needs to know her prognosis. She states she is not well enough to come in and see Dr. Richrd Brittle. She would like for him to give her \"the best estimate of how long she will live\". Will have triage RN discuss with Dr. Richrd Brittle. She asked to be called back at [phone].

## 2019-04-17 ENCOUNTER — TELEPHONE (OUTPATIENT)
Dept: INTERNAL MEDICINE CLINIC | Age: 74
End: 2019-04-17

## 2023-05-20 NOTE — CARE COORDINATION
ADMISSION NOTE       Patient admitted to room  2065. Time of admit:  1250    Admit from:  ER    Reason for admission:  Breast cancer    Where patient has been residing for the last 24 hrs:  Private residence    Has the patient been admitted to any facility in the last 4 weeks, which one:  no    Family at bedside:  yes    Patient is currently in pain yes    Patient has been oriented to room, educated on how to use call light, and to call for assistance prior to getting up. Bed in lowest and locked position. 2 siderails up for safety. Call light within reach. [facility]  DVT Prophylaxis and Vaccine Status  Work List  Mandatory for all patients      Patient must be on both Chemical prophylaxis and Mechanical prophylaxis.  If chemical/mechanical prophylaxis is not ordered, the physician must document a reason for not using prophylaxis     Chemical Prophylaxis  Is patient on chemical prophylaxis: Yes  If no chemical prophylaxis Is a order in for No Chemical VTE prophylaxisNo  If no was the physician notified not applicable      Mechanical Prophylaxis  Is patient on mechanical prophylaxis, intermittent pneumatic compression device: No  If no was the physician notified not applicable        Pneumonia Vaccine  Vaccine indicated:  Not indicated  If indicated was the vaccine given: not applicable    Influenza Vaccine (applicable from October through March):  Vaccine indicated: Not indicated  If indicated was the vaccine given: not applicable    Patient Education  Education completed on DVT prophylaxis: yes
CASE MANAGEMENT NOTE:    Admission Date:  7/15/2018 Angelia Collet is a 81 y.o.  female    Admitted for : Metastatic breast cancer ([facility] [address] [C50.919]    Met with:  Patient    PCP:  Dr. Homar Camara:  [employer]      Current Residence/ Living Arrangements:  independently at home alone. Used to drive, but hasn't been able to for the last couple week. s             Current Services PTA:  No    Is patient agreeable to VNS: Yes. Pt would be agreeable to VNS, however she recognizes that she will need to go to SNF. States she would like to go to [facility]. Notified Em Perez, of this. DME:  walker    Home Oxygen: No    Nebulizer: No    Supplier: N/A    Potential Assistance Needed: SNF. Could also probably benefit from palliative care. SNF needed: Yes, [facility]    Pharmacy:  [facility] in [place]       Does Patient want to use MEDS to BEDS? No    Family Members/Caregivers that pt would like involved in their care:    Yes    If yes, list name here:  Conor Rubio, brother.   Pt states he is very supportive    Transportation Provider:  Patient and Family                      Discharge Plan:  [facility]                 Electronically signed by: Krystin Sarabia RN on 7/17/2018 at 2:15 PM
DISCHARGE PLANNING NOTE:    Plan is for this patient to go to [facility]. LSW is following along for this. PT/OT recs SNF vs IP REHAB - Patient does not want to go to [address] [place] [address].      Electronically signed by Jc Sampson RN on 7/19/2018 at 12:44 PM
Social work: asked acute rehab if pt wants rehab at discharge or needs it if she could qualify for an acute setting. They will review therapy notes and advise lsw of possible option for pt. Advised in team conference pt may need rehab at discharge. Will need anum, Rx at discharge.   Serg tylerw
No

## (undated) DEVICE — NEEDLE SPNL L3.5IN PNK HUB S STL REG WALL FIT STYL W/ QNCKE

## (undated) DEVICE — BONE TAMP KIT KEX152EB FF E2 15/2 OI: Brand: KYPHON EXPRESS II KYPHOPAK TRAY

## (undated) DEVICE — GOWN,AURORA,NONREINFORCED,LARGE: Brand: MEDLINE

## (undated) DEVICE — SOLUTION SURG PREP POV IOD 7.5% 4 OZ

## (undated) DEVICE — BONE TAMP KIT KPX203PB FF X2 20/3 1 STP: Brand: KYPHOPAK™ TRAY

## (undated) DEVICE — SOLUTION IV IRRIG POUR BRL 0.9% SODIUM CHL 2F7124

## (undated) DEVICE — MARKER,SKIN,WI/RULER AND LABELS: Brand: MEDLINE

## (undated) DEVICE — Z DUP USE 2392665 BLADE LARYNGOSCOPE STAT GLIDESCOPE GVL 4

## (undated) DEVICE — 3M™ STERI-DRAPE™ U-DRAPE 1015: Brand: STERI-DRAPE™

## (undated) DEVICE — TOWEL,OR,DSP,ST,BLUE,STD,6/PK,12PK/CS: Brand: MEDLINE

## (undated) DEVICE — SPONGE LAP W18XL18IN WHT COT 4 PLY FLD STRUNG RADPQ DISP ST

## (undated) DEVICE — C-ARMOR C-ARM EQUIPMENT COVERS CLEAR STERILE UNIVERSAL FIT 12 PER CASE: Brand: C-ARMOR

## (undated) DEVICE — ROD RMR L950MM DIA2.5MM W/ EXTN BALL TIP

## (undated) DEVICE — 1010 S-DRAPE TOWEL DRAPE 10/BX: Brand: STERI-DRAPE™

## (undated) DEVICE — KIT OCN002 OSTEOCOOL BONE ACCESS 10G 090: Brand: OSTEOCOOL™ BONE ACCESS KIT

## (undated) DEVICE — GLOVE SURG SZ 65 THK91MIL LTX FREE SYN POLYISOPRENE

## (undated) DEVICE — 6619 2 PTNT ISO SYS INCISE AREA&LT;(&GT;&&LT;)&GT;P: Brand: STERI-DRAPE™ IOBAN™ 2

## (undated) DEVICE — Device

## (undated) DEVICE — YANKAUER,OPEN TIP,W/O VENT,STERILE: Brand: MEDLINE INDUSTRIES, INC.

## (undated) DEVICE — 3M™ IOBAN™ 2 ANTIMICROBIAL INCISE DRAPE 6650EZ: Brand: IOBAN™ 2

## (undated) DEVICE — PROBE OCP215 OSTEOCOOL RF 17G 15MMX2: Brand: OSTEOCOOL™ RF ABLATION SYSTEM

## (undated) DEVICE — BONE TAMP KIT KPX203NB AF X2 20/3

## (undated) DEVICE — SYRINGE A08E KIS INFLATION HP: Brand: KYPHON®  INFLATION SYRINGE

## (undated) DEVICE — CHLORAPREP 26ML ORANGE

## (undated) DEVICE — SYRINGE IRRIG 60ML SFT PLIABLE BLB EZ TO GRP 1 HND USE W/

## (undated) DEVICE — MIXER A07A CEMENT

## (undated) DEVICE — NEEDLE SPNL 18GA L3.5IN W/ QNCKE SHARPER BVL DURA CLICK

## (undated) DEVICE — DRESSING PETRO W3XL3IN OIL EMUL N ADH GZ KNIT IMPREG CELOS

## (undated) DEVICE — DRAPE C ARM W104XL188CM FLD MOB XR

## (undated) DEVICE — COVER,TABLE,HEAVY DUTY,50"X90",STRL: Brand: MEDLINE

## (undated) DEVICE — DRAPE IRRIG FLD WRM W44XL44IN W/ AORN STD PRTBL INTRATEMP

## (undated) DEVICE — 3M™ WARMING BLANKET, LOWER BODY, 10 PER CASE, 42568: Brand: BAIR HUGGER™

## (undated) DEVICE — COVER,MAYO STAND,XL,STERILE: Brand: MEDLINE

## (undated) DEVICE — Z DISCONTINUED BY MEDLINE USE 2711682 TRAY SKIN PREP DRY W/ PREM GLV

## (undated) DEVICE — GUIDEWIRE ORTH L400MM DIA3.2MM FOR TFN

## (undated) DEVICE — KIT OCN003 OSTEOCOOL BONE ACCESS 8G 090: Brand: OSTEOCOOL™ BONE ACCESS KIT

## (undated) DEVICE — PROBE KIT OCP107 OSTEOCOOL RF 17G 7MM: Brand: OSTEOCOOL™ RF ABLATION SYSTEM

## (undated) DEVICE — DRESSING,GAUZE,XEROFORM,CURAD,1"X8",ST: Brand: CURAD

## (undated) DEVICE — DISCONTINUED NO SUB IDED TG GLOVE SURG SENSICARE ALOE LT LF PF ST GRN SZ 8

## (undated) DEVICE — GOWN,SIRUS,NONRNF,SETINSLV,XL,20/CS: Brand: MEDLINE

## (undated) DEVICE — SUTURE MCRYL + SZ 4-0 L27IN ABSRB UD L19MM PS-2 3/8 CIR MCP426H

## (undated) DEVICE — GLOVE ORANGE PI 7 1/2   MSG9075

## (undated) DEVICE — BIT DRL L145MM DIA4.2MM NONSTERILE 3 FLUT NDL PNT QUIK CPL

## (undated) DEVICE — 3M™ WARMING BLANKET, UPPER BODY, 10 PER CASE, 42268: Brand: BAIR HUGGER™

## (undated) DEVICE — BANDAGE ADH W0.75XL3IN NAT PLAS CURAD

## (undated) DEVICE — BONE BIOPSY DEVICE F05A BBD SIZE 3: Brand: MEDTRONIC REUSABLE INSTRUMENTS

## (undated) DEVICE — GAUZE,SPONGE,FLUFF,6"X6.75",STRL,5/TRAY: Brand: MEDLINE

## (undated) DEVICE — ROD RMR L950MM DIA3MM W/ STR BALL TIP

## (undated) DEVICE — PAD,ABDOMINAL,8"X7.5",ST,LF,20/BX: Brand: MEDLINE INDUSTRIES, INC.

## (undated) DEVICE — PAD,NON-ADHERENT,3X8,STERILE,LF,1/PK: Brand: MEDLINE

## (undated) DEVICE — DRAPE,REIN 53X77,STERILE: Brand: MEDLINE

## (undated) DEVICE — ULTRACLEAN ACCESSORY ELECTRODE 1" (2.54 CM) COATED BLADE WITH EXTENDED INSULATION: Brand: ULTRACLEAN

## (undated) DEVICE — Z DISCONTINUED NO SUB IDED GLOVE SURG BEAD CUF 8 STD PF WHT STRL TRIUMPH LT LTX

## (undated) DEVICE — JACKSON / MODULAR SPINAL TABLE STYLE POSITIONING KIT - STANDARD: Brand: SOULE MEDICAL

## (undated) DEVICE — SUTURE VIC + LEN CP1 0 70CM VCP267H

## (undated) DEVICE — TUBING, SUCTION, 3/16" X 10', STRAIGHT: Brand: MEDLINE

## (undated) DEVICE — CURETTE A13A SIZE 2 T-TIP: Brand: KYPHON® EXPRESS ™ CURETTE

## (undated) DEVICE — SHEET, T, LAPAROTOMY, STERILE: Brand: MEDLINE

## (undated) DEVICE — PREP SOL PVP IODINE 4%  4 OZ/BTL

## (undated) DEVICE — SUTURE VCRL SZ 0 L36IN ABSRB UD CT-1 L36MM 1/2 CIR TAPR PNT VCP946H

## (undated) DEVICE — SUTURE VCRL + SZ 4-0 L18IN ABSRB UD L19MM PS-2 3/8 CIR PRIM VCP496H

## (undated) DEVICE — GLOVE ORANGE PI 7   MSG9070

## (undated) DEVICE — DRESSING TRNSPAR W5XL4.5IN FLM SHT SEMIPERMEABLE WIND

## (undated) DEVICE — 1840 FOAM BLOCK NEEDLE COUNTER: Brand: DEVON

## (undated) DEVICE — SYRINGE, LUER LOCK, 10ML: Brand: MEDLINE

## (undated) DEVICE — COVER,MAYO STAND,STERILE: Brand: MEDLINE

## (undated) DEVICE — SOLUTION IV IRRIG WATER 1000ML POUR BRL 2F7114

## (undated) DEVICE — SUTURE VCRL + SZ 2-0 L27IN ABSRB UD CT-2 L26MM 1/2 CIR TAPR VCP269H

## (undated) DEVICE — GLOVE SURG SZ 7 L12IN FNGR THK13MIL BRN LTX SYN POLYMER W

## (undated) DEVICE — GLOVE SURG SZ 8 L12IN FNGR THK13MIL BRN LTX SYN POLYMER W

## (undated) DEVICE — SINGLE PORT MANIFOLD: Brand: NEPTUNE 2